# Patient Record
Sex: FEMALE | Race: WHITE | NOT HISPANIC OR LATINO | Employment: UNEMPLOYED | URBAN - METROPOLITAN AREA
[De-identification: names, ages, dates, MRNs, and addresses within clinical notes are randomized per-mention and may not be internally consistent; named-entity substitution may affect disease eponyms.]

---

## 2017-12-21 ENCOUNTER — TELEPHONE (OUTPATIENT)
Dept: OTOLARYNGOLOGY | Facility: CLINIC | Age: 22
End: 2017-12-21

## 2017-12-22 ENCOUNTER — HISTORICAL (OUTPATIENT)
Dept: ADMINISTRATIVE | Facility: HOSPITAL | Age: 22
End: 2017-12-22

## 2017-12-22 ENCOUNTER — HOSPITAL ENCOUNTER (OUTPATIENT)
Dept: INTERNAL MEDICINE | Facility: CLINIC | Age: 22
End: 2022-03-25
Attending: NURSE PRACTITIONER | Admitting: PHYSICIAN ASSISTANT

## 2017-12-22 LAB
ABS NEUT (OLG): 3.68 X10(3)/MCL (ref 2.1–9.2)
APPEARANCE, UA: CLEAR
BACTERIA #/AREA URNS AUTO: ABNORMAL /[HPF]
BASOPHILS # BLD AUTO: 0.02 X10(3)/MCL
BASOPHILS NFR BLD AUTO: 0 % (ref 0–1)
BILIRUB UR QL STRIP: NEGATIVE
COLOR UR: YELLOW
CRP SERPL-MCNC: <0.3 MG/DL
EOSINOPHIL # BLD AUTO: 0.04 X10(3)/MCL
EOSINOPHIL NFR BLD AUTO: 1 % (ref 0–5)
ERYTHROCYTE [DISTWIDTH] IN BLOOD BY AUTOMATED COUNT: 13.2 % (ref 11.5–14.5)
ERYTHROCYTE [SEDIMENTATION RATE] IN BLOOD: 7 MM/HR (ref 0–20)
GLUCOSE (UA): NORMAL
HAV IGM SERPL QL IA: NONREACTIVE
HBV CORE IGM SERPL QL IA: NONREACTIVE
HBV SURFACE AG SERPL QL IA: NEGATIVE
HCT VFR BLD AUTO: 38.6 % (ref 35–46)
HCV AB SERPL QL IA: NONREACTIVE
HGB BLD-MCNC: 12.9 GM/DL (ref 12–16)
HGB UR QL STRIP: NEGATIVE
HIV 1+2 AB+HIV1 P24 AG SERPL QL IA: NONREACTIVE
HYALINE CASTS #/AREA URNS LPF: ABNORMAL /[LPF]
IMM GRANULOCYTES # BLD AUTO: 0.01 10*3/UL
IMM GRANULOCYTES NFR BLD AUTO: 0 %
KETONES UR QL STRIP: 10 MG/DL
LEUKOCYTE ESTERASE UR QL STRIP: NEGATIVE
LYMPHOCYTES # BLD AUTO: 1.84 X10(3)/MCL
LYMPHOCYTES NFR BLD AUTO: 31 % (ref 15–40)
MCH RBC QN AUTO: 30.4 PG (ref 26–34)
MCHC RBC AUTO-ENTMCNC: 33.4 GM/DL (ref 31–37)
MCV RBC AUTO: 90.8 FL (ref 80–100)
MONOCYTES # BLD AUTO: 0.42 X10(3)/MCL
MONOCYTES NFR BLD AUTO: 7 % (ref 4–12)
NEUTROPHILS # BLD AUTO: 3.68 X10(3)/MCL
NEUTROPHILS NFR BLD AUTO: 61 X10(3)/MCL
NITRITE UR QL STRIP: NEGATIVE
PH UR STRIP: 6.5 [PH] (ref 4.5–8)
PLATELET # BLD AUTO: 209 X10(3)/MCL (ref 130–400)
PMV BLD AUTO: 12.2 FL (ref 7.4–10.4)
PROT UR QL STRIP: 50 MG/DL
RBC # BLD AUTO: 4.25 X10(6)/MCL (ref 4–5.2)
RBC #/AREA URNS AUTO: ABNORMAL /[HPF]
SP GR UR STRIP: >1.05 (ref 1–1.03)
SQUAMOUS #/AREA URNS LPF: >100 /[LPF]
T PALLIDUM AB SER QL: NONREACTIVE
T3RU NFR SERPL: 31 % (ref 31–39)
T4 FREE SERPL-MCNC: 0.58 NG/DL (ref 0.76–1.46)
T4 SERPL-MCNC: 4.6 MCG/DL (ref 4.8–13.9)
TSH SERPL-ACNC: 7.18 MIU/L (ref 0.36–3.74)
UROBILINOGEN UR STRIP-ACNC: NORMAL
WBC # SPEC AUTO: 6 X10(3)/MCL (ref 4.5–11)
WBC #/AREA URNS AUTO: ABNORMAL /HPF

## 2017-12-24 LAB — FINAL CULTURE: NO GROWTH

## 2017-12-27 ENCOUNTER — TELEPHONE (OUTPATIENT)
Dept: OTOLARYNGOLOGY | Facility: CLINIC | Age: 22
End: 2017-12-27

## 2017-12-27 LAB — FINAL CULTURE: NORMAL

## 2017-12-27 NOTE — TELEPHONE ENCOUNTER
Attempted to reach this patient to cancel the appointment she has scheduled with Dr. Kumar on March 14th due to patient not having a referral in chart but fast busy signal.

## 2018-01-04 ENCOUNTER — HISTORICAL (OUTPATIENT)
Dept: RADIOLOGY | Facility: HOSPITAL | Age: 23
End: 2018-01-04

## 2018-01-08 ENCOUNTER — HISTORICAL (OUTPATIENT)
Dept: INTERNAL MEDICINE | Facility: CLINIC | Age: 23
End: 2018-01-08

## 2018-01-08 LAB
ABS NEUT (OLG): 6.24 X10(3)/MCL (ref 2.1–9.2)
BASOPHILS # BLD AUTO: 0.04 X10(3)/MCL
BASOPHILS NFR BLD AUTO: 0 % (ref 0–1)
CRP SERPL-MCNC: <0.3 MG/DL
EOSINOPHIL # BLD AUTO: 0.08 X10(3)/MCL
EOSINOPHIL NFR BLD AUTO: 1 % (ref 0–5)
ERYTHROCYTE [DISTWIDTH] IN BLOOD BY AUTOMATED COUNT: 13.2 % (ref 11.5–14.5)
ERYTHROCYTE [SEDIMENTATION RATE] IN BLOOD: 2 MM/HR (ref 0–20)
HCT VFR BLD AUTO: 40.7 % (ref 35–46)
HGB BLD-MCNC: 13.5 GM/DL (ref 12–16)
IMM GRANULOCYTES # BLD AUTO: 0.03 10*3/UL
IMM GRANULOCYTES NFR BLD AUTO: 0 %
LYMPHOCYTES # BLD AUTO: 2.13 X10(3)/MCL
LYMPHOCYTES NFR BLD AUTO: 23 % (ref 15–40)
MCH RBC QN AUTO: 30.8 PG (ref 26–34)
MCHC RBC AUTO-ENTMCNC: 33.2 GM/DL (ref 31–37)
MCV RBC AUTO: 92.9 FL (ref 80–100)
MONOCYTES # BLD AUTO: 0.59 X10(3)/MCL
MONOCYTES NFR BLD AUTO: 6 % (ref 4–12)
NEUTROPHILS # BLD AUTO: 6.24 X10(3)/MCL
NEUTROPHILS NFR BLD AUTO: 68 X10(3)/MCL
PHOSPHOLIPID AB COM-LC: NORMAL
PHOSPHOLIPID AB INT-LC: NORMAL
PLATELET # BLD AUTO: 205 X10(3)/MCL (ref 130–400)
PMV BLD AUTO: 12.8 FL (ref 7.4–10.4)
RBC # BLD AUTO: 4.38 X10(6)/MCL (ref 4–5.2)
WBC # SPEC AUTO: 9.1 X10(3)/MCL (ref 4.5–11)

## 2018-01-12 ENCOUNTER — HISTORICAL (OUTPATIENT)
Dept: INTERNAL MEDICINE | Facility: CLINIC | Age: 23
End: 2018-01-12

## 2018-01-13 LAB
FINAL CULTURE: NORMAL
FINAL CULTURE: NORMAL

## 2018-02-01 ENCOUNTER — HISTORICAL (OUTPATIENT)
Dept: INTERNAL MEDICINE | Facility: CLINIC | Age: 23
End: 2018-02-01

## 2018-02-01 LAB
T4 FREE SERPL-MCNC: 0.87 NG/DL (ref 0.76–1.46)
TSH SERPL-ACNC: 0.33 MIU/L (ref 0.36–3.74)

## 2018-02-04 LAB — FINAL CULTURE: NORMAL

## 2018-02-05 ENCOUNTER — HISTORICAL (OUTPATIENT)
Dept: ADMINISTRATIVE | Facility: HOSPITAL | Age: 23
End: 2018-02-05

## 2018-02-05 LAB
BUN SERPL-MCNC: 9 MG/DL (ref 7–18)
CALCIUM SERPL-MCNC: 9 MG/DL (ref 8.5–10.1)
CHLORIDE SERPL-SCNC: 103 MMOL/L (ref 98–107)
CO2 SERPL-SCNC: 28 MMOL/L (ref 21–32)
CREAT SERPL-MCNC: 0.6 MG/DL (ref 0.6–1.3)
FOLATE SERPL-MCNC: 10.6 NG/ML (ref 3.1–17.5)
GLUCOSE SERPL-MCNC: 91 MG/DL (ref 74–106)
POTASSIUM SERPL-SCNC: 3.9 MMOL/L (ref 3.5–5.1)
SODIUM SERPL-SCNC: 138 MMOL/L (ref 136–145)
VIT B12 SERPL-MCNC: 529 PG/ML (ref 193–986)

## 2018-03-05 ENCOUNTER — HISTORICAL (OUTPATIENT)
Dept: INTERNAL MEDICINE | Facility: CLINIC | Age: 23
End: 2018-03-05

## 2018-03-05 LAB
BUN SERPL-MCNC: 10 MG/DL (ref 7–18)
CALCIUM SERPL-MCNC: 8.7 MG/DL (ref 8.5–10.1)
CHLORIDE SERPL-SCNC: 105 MMOL/L (ref 98–107)
CO2 SERPL-SCNC: 27 MMOL/L (ref 21–32)
CREAT SERPL-MCNC: 0.6 MG/DL (ref 0.6–1.3)
CREAT/UREA NIT SERPL: 17
GLUCOSE SERPL-MCNC: 89 MG/DL (ref 74–106)
MAGNESIUM SERPL-MCNC: 2.3 MG/DL (ref 1.8–2.4)
POTASSIUM SERPL-SCNC: 4.1 MMOL/L (ref 3.5–5.1)
SODIUM SERPL-SCNC: 138 MMOL/L (ref 136–145)
T4 FREE SERPL-MCNC: 0.64 NG/DL (ref 0.76–1.46)
T4 SERPL-MCNC: 7.2 MCG/DL (ref 4.8–13.9)
TSH SERPL-ACNC: 7.21 MIU/L (ref 0.36–3.74)

## 2018-04-04 ENCOUNTER — HISTORICAL (OUTPATIENT)
Dept: ADMINISTRATIVE | Facility: HOSPITAL | Age: 23
End: 2018-04-04

## 2018-04-04 LAB
ABS NEUT (OLG): 5.66 X10(3)/MCL (ref 2.1–9.2)
BASOPHILS # BLD AUTO: 0.05 X10(3)/MCL
BASOPHILS NFR BLD AUTO: 1 %
EOSINOPHIL # BLD AUTO: 0.22 X10(3)/MCL
EOSINOPHIL NFR BLD AUTO: 2 %
ERYTHROCYTE [DISTWIDTH] IN BLOOD BY AUTOMATED COUNT: 13.5 % (ref 11.5–14.5)
HCT VFR BLD AUTO: 35.9 % (ref 35–46)
HGB BLD-MCNC: 11.3 GM/DL (ref 12–16)
IMM GRANULOCYTES # BLD AUTO: 0.03 10*3/UL
IMM GRANULOCYTES NFR BLD AUTO: 0 %
INR PPP: 0.96 (ref 0.9–1.2)
LYMPHOCYTES # BLD AUTO: 2.67 X10(3)/MCL
LYMPHOCYTES NFR BLD AUTO: 30 % (ref 13–40)
MCH RBC QN AUTO: 28.6 PG (ref 26–34)
MCHC RBC AUTO-ENTMCNC: 31.5 GM/DL (ref 31–37)
MCV RBC AUTO: 90.9 FL (ref 80–100)
MONOCYTES # BLD AUTO: 0.43 X10(3)/MCL
MONOCYTES NFR BLD AUTO: 5 % (ref 4–12)
NEUTROPHILS # BLD AUTO: 5.66 X10(3)/MCL
NEUTROPHILS NFR BLD AUTO: 62 X10(3)/MCL
PLATELET # BLD AUTO: 258 X10(3)/MCL (ref 130–400)
PMV BLD AUTO: 11.4 FL (ref 7.4–10.4)
PROTHROMBIN TIME: 12.1 SECOND(S) (ref 11.9–14.4)
RBC # BLD AUTO: 3.95 X10(6)/MCL (ref 4–5.2)
T4 FREE SERPL-MCNC: 0.58 NG/DL (ref 0.76–1.46)
T4 SERPL-MCNC: 6.3 MCG/DL (ref 4.8–13.9)
TSH SERPL-ACNC: 3.84 MIU/L (ref 0.36–3.74)
WBC # SPEC AUTO: 9.1 X10(3)/MCL (ref 4.5–11)

## 2018-05-08 ENCOUNTER — HISTORICAL (OUTPATIENT)
Dept: RADIOLOGY | Facility: HOSPITAL | Age: 23
End: 2018-05-08

## 2018-07-05 ENCOUNTER — HISTORICAL (OUTPATIENT)
Dept: INTERNAL MEDICINE | Facility: CLINIC | Age: 23
End: 2018-07-05

## 2018-07-05 LAB
T4 FREE SERPL-MCNC: 1.02 NG/DL (ref 0.76–1.46)
TSH SERPL-ACNC: 0.63 MIU/L (ref 0.36–3.74)

## 2018-07-20 ENCOUNTER — HISTORICAL (OUTPATIENT)
Dept: ADMINISTRATIVE | Facility: HOSPITAL | Age: 23
End: 2018-07-20

## 2018-07-20 LAB
APPEARANCE, UA: ABNORMAL
B-HCG SERPL QL: NEGATIVE
BACTERIA #/AREA URNS AUTO: ABNORMAL /[HPF]
BILIRUB UR QL STRIP: NEGATIVE
COLOR UR: YELLOW
GLUCOSE (UA): NORMAL
HGB UR QL STRIP: NEGATIVE
HYALINE CASTS #/AREA URNS LPF: ABNORMAL /[LPF]
KETONES UR QL STRIP: NEGATIVE
LEUKOCYTE ESTERASE UR QL STRIP: 250 LEU/UL
NITRITE UR QL STRIP: NEGATIVE
PH UR STRIP: 6.5 [PH] (ref 4.5–8)
PROT UR QL STRIP: 20 MG/DL
RBC #/AREA URNS AUTO: ABNORMAL /[HPF]
SP GR UR STRIP: 1.03 (ref 1–1.03)
SQUAMOUS #/AREA URNS LPF: >100 /[LPF]
UROBILINOGEN UR STRIP-ACNC: NORMAL
WBC #/AREA URNS AUTO: ABNORMAL /HPF

## 2018-07-22 LAB — FINAL CULTURE: NO GROWTH

## 2018-08-02 ENCOUNTER — HISTORICAL (OUTPATIENT)
Dept: ADMINISTRATIVE | Facility: HOSPITAL | Age: 23
End: 2018-08-02

## 2018-08-02 LAB
T3FREE SERPL-MCNC: 3.55 PG/ML (ref 2.18–3.98)
T4 FREE SERPL-MCNC: 1.14 NG/DL (ref 0.76–1.46)
TSH SERPL-ACNC: 0.02 MIU/L (ref 0.36–3.74)

## 2018-10-19 ENCOUNTER — HISTORICAL (OUTPATIENT)
Dept: ADMINISTRATIVE | Facility: HOSPITAL | Age: 23
End: 2018-10-19

## 2018-10-19 LAB — TSH SERPL-ACNC: 0.37 MIU/L (ref 0.36–3.74)

## 2019-05-08 ENCOUNTER — HISTORICAL (OUTPATIENT)
Dept: ADMINISTRATIVE | Facility: HOSPITAL | Age: 24
End: 2019-05-08

## 2019-05-08 LAB
T3FREE SERPL-MCNC: 3.68 PG/ML (ref 2.18–3.98)
T4 FREE SERPL-MCNC: 1 NG/DL (ref 0.76–1.46)
TSH SERPL-ACNC: 0.33 MIU/L (ref 0.36–3.74)

## 2019-06-28 ENCOUNTER — HISTORICAL (OUTPATIENT)
Dept: RADIOLOGY | Facility: HOSPITAL | Age: 24
End: 2019-06-28

## 2019-07-03 ENCOUNTER — TELEPHONE (OUTPATIENT)
Dept: OTOLARYNGOLOGY | Facility: CLINIC | Age: 24
End: 2019-07-03

## 2019-07-03 NOTE — TELEPHONE ENCOUNTER
Informed that pt needs to see her PCP and get a referral and possibly a suggestion from them in order to be seen.        ----- Message from Santa Silver sent at 7/3/2019  4:05 PM CDT -----  Contact: pt   Pt is requesting an appt due to neck mass and thyroid    Pt can be reached at [phone]

## 2019-07-15 ENCOUNTER — TELEPHONE (OUTPATIENT)
Dept: OTOLARYNGOLOGY | Facility: CLINIC | Age: 24
End: 2019-07-15

## 2019-08-29 ENCOUNTER — LAB VISIT (OUTPATIENT)
Dept: LAB | Facility: HOSPITAL | Age: 24
End: 2019-08-29
Attending: OTOLARYNGOLOGY
Payer: MEDICAID

## 2019-08-29 ENCOUNTER — OFFICE VISIT (OUTPATIENT)
Dept: OTOLARYNGOLOGY | Facility: CLINIC | Age: 24
End: 2019-08-29
Payer: MEDICAID

## 2019-08-29 VITALS
WEIGHT: 123.44 LBS | TEMPERATURE: 99 F | DIASTOLIC BLOOD PRESSURE: 72 MMHG | HEART RATE: 103 BPM | SYSTOLIC BLOOD PRESSURE: 108 MMHG

## 2019-08-29 DIAGNOSIS — E05.00 GRAVES DISEASE: ICD-10-CM

## 2019-08-29 DIAGNOSIS — E05.00 GRAVES DISEASE: Primary | ICD-10-CM

## 2019-08-29 LAB
T4 SERPL-MCNC: 7.5 UG/DL (ref 4.5–11.5)
TSH SERPL DL<=0.005 MIU/L-ACNC: 3.21 UIU/ML (ref 0.4–4)

## 2019-08-29 PROCEDURE — 99999 PR PBB SHADOW E&M-EST. PATIENT-LVL III: CPT | Mod: PBBFAC,,, | Performed by: OTOLARYNGOLOGY

## 2019-08-29 PROCEDURE — 99999 PR PBB SHADOW E&M-EST. PATIENT-LVL III: ICD-10-PCS | Mod: PBBFAC,,, | Performed by: OTOLARYNGOLOGY

## 2019-08-29 PROCEDURE — 99203 OFFICE O/P NEW LOW 30 MIN: CPT | Mod: S$PBB,,, | Performed by: OTOLARYNGOLOGY

## 2019-08-29 PROCEDURE — 84443 ASSAY THYROID STIM HORMONE: CPT

## 2019-08-29 PROCEDURE — 84436 ASSAY OF TOTAL THYROXINE: CPT

## 2019-08-29 PROCEDURE — 99203 PR OFFICE/OUTPT VISIT, NEW, LEVL III, 30-44 MIN: ICD-10-PCS | Mod: S$PBB,,, | Performed by: OTOLARYNGOLOGY

## 2019-08-29 PROCEDURE — 36415 COLL VENOUS BLD VENIPUNCTURE: CPT

## 2019-08-29 PROCEDURE — 99213 OFFICE O/P EST LOW 20 MIN: CPT | Mod: PBBFAC | Performed by: OTOLARYNGOLOGY

## 2019-08-29 RX ORDER — GABAPENTIN 300 MG/1
300 CAPSULE ORAL NIGHTLY
Refills: 5 | COMMUNITY
Start: 2019-08-05 | End: 2022-07-19

## 2019-08-29 RX ORDER — NITROFURANTOIN 25; 75 MG/1; MG/1
CAPSULE ORAL
Refills: 0 | COMMUNITY
Start: 2019-08-13 | End: 2022-07-19

## 2019-08-29 RX ORDER — FLUTICASONE PROPIONATE 50 MCG
SPRAY, SUSPENSION (ML) NASAL
Refills: 0 | COMMUNITY
Start: 2019-06-12 | End: 2022-07-19

## 2019-08-29 RX ORDER — RIZATRIPTAN BENZOATE 10 MG/1
TABLET ORAL
Refills: 1 | COMMUNITY
Start: 2019-08-19 | End: 2022-07-19

## 2019-08-29 RX ORDER — BUSPIRONE HYDROCHLORIDE 10 MG/1
TABLET ORAL
Refills: 2 | COMMUNITY
Start: 2019-08-19 | End: 2022-05-12

## 2019-08-29 RX ORDER — DULOXETIN HYDROCHLORIDE 60 MG/1
60 CAPSULE, DELAYED RELEASE ORAL DAILY
Refills: 5 | COMMUNITY
Start: 2019-08-05

## 2019-08-29 RX ORDER — METHIMAZOLE 5 MG/1
10 TABLET ORAL
COMMUNITY
End: 2022-07-19

## 2019-08-29 RX ORDER — PROPRANOLOL HYDROCHLORIDE 10 MG/1
10 TABLET ORAL 2 TIMES DAILY
Refills: 5 | COMMUNITY
Start: 2019-08-05 | End: 2023-11-28

## 2019-08-29 RX ORDER — BACLOFEN 20 MG/1
TABLET ORAL
Refills: 0 | COMMUNITY
Start: 2019-07-10 | End: 2022-10-06 | Stop reason: CLARIF

## 2019-08-29 RX ORDER — AMOXICILLIN 875 MG/1
TABLET, FILM COATED ORAL
Refills: 0 | COMMUNITY
Start: 2019-08-19 | End: 2022-10-06 | Stop reason: CLARIF

## 2019-08-29 RX ORDER — ALPRAZOLAM 0.25 MG/1
TABLET ORAL
Refills: 0 | COMMUNITY
Start: 2019-07-24 | End: 2022-07-19

## 2019-08-29 NOTE — LETTER
August 30, 2019      Claude H. Meeks, MD  [address] [place] [address] Dr Doron QUIROZ [address]           [facility]  [address]  [facility] [address]  Phone: [phone]  Fax: [phone]          Patient: Royce Quigley   MR Number: [id number]   YOB: 1995   Date of Visit: 8/29/2019       Dear Dr. Claude H. Meeks:    Thank you for referring Royce Quigley to me for evaluation. Attached you will find relevant portions of my assessment and plan of care.    If you have questions, please do not hesitate to call me. I look forward to following Royce Quigley along with you.    Sincerely,    Darshan Arias MD    Enclosure  CC:  No Recipients    If you would like to receive this communication electronically, please contact [email or web address] or [phone] to request more information on [facility] access.    For providers and/or their staff who would like to refer a patient to [facility], please contact us through our one-stop-shop provider referral line, [facility], at [phone].    If you feel you have received this communication in error or would no longer like to receive these types of communications, please e-mail [email or web address]

## 2019-08-30 PROBLEM — E05.00 GRAVES DISEASE: Status: ACTIVE | Noted: 2019-08-30

## 2019-08-30 NOTE — PROGRESS NOTES
Chief Complaint   Patient presents with    Thyroid Problem       HPI   24 y.o. female presents for evaluation of her surgical options for hyperthyroidism.  She has been followed by ENT and endocrinology near her home in [place], [place].  Both surgery and radioactive iodine have been recommended to her.  She presents today for a 2nd opinion.  She is currently on methimazole and is tolerating it reasonably well. She has not had recent thyroid functions.  She reports that she feels her eyes are somewhat prominent but she denies danisha eye symptoms.  She also denies danisha compressive symptoms such as dysphagia or shortness of breath.  She underwent recent thyroid ultrasound which revealed a normal size thyroid with several nodules.  None of these nodules met criteria for fine-needle aspiration.    Review of Systems   Constitutional: Negative for fatigue and unexpected weight change.   HENT: Per HPI.  Eyes: Negative for visual disturbance.   Respiratory: Negative for shortness of breath, hemoptysis   Cardiovascular: Negative for chest pain and palpitations.   Musculoskeletal: Negative for decreased ROM, back pain.   Skin: Negative for rash, sunburn, itching.   Neurological: Negative for dizziness and seizures.   Hematological: Negative for adenopathy. Does not bruise/bleed easily.   Endocrine: Negative for rapid weight loss/weight gain, heat/cold intolerance.     Past Medical History   There is no problem list on file for this patient.          Past Surgical History   History reviewed. No pertinent surgical history.      Family History   History reviewed. No pertinent family history.        Social History   .  Social History     Socioeconomic History    Marital status: Single     Spouse name: Not on file    Number of children: Not on file    Years of education: Not on file    Highest education level: Not on file   Occupational History    Not on file   Social Needs    Financial resource strain: Not on file    Food  insecurity:     Worry: Not on file     Inability: Not on file    Transportation needs:     Medical: Not on file     Non-medical: Not on file   Tobacco Use    Smoking status: Current Every Day Smoker   Substance and Sexual Activity    Alcohol use: Not on file    Drug use: Not on file    Sexual activity: Not on file   Lifestyle    Physical activity:     Days per week: Not on file     Minutes per session: Not on file    Stress: Not on file   Relationships    Social connections:     Talks on phone: Not on file     Gets together: Not on file     Attends Protestant service: Not on file     Active member of club or organization: Not on file     Attends meetings of clubs or organizations: Not on file     Relationship status: Not on file   Other Topics Concern    Not on file   Social History Narrative    Not on file         Allergies   Review of patient's allergies indicates:  No Known Allergies        Physical Exam     Vitals:    08/29/19 1435   BP: 108/72   Pulse: 103   Temp: 98.9 °F (37.2 °C)         There is no height or weight on file to calculate BMI.      General: AOx3, NAD   Respiratory:  Symmetric chest rise, normal effort  Right Ear: External Auditory Canal WNL,TM w/o masses/lesions/perforations.  Middle ear without evidence of effusion.   Left Ear: External Auditory Canal WNL,TM w/o masses/lesions/perforations.  Middle ear without evidence of effusion.   Nose: No gross nasal septal deviation. Inferior Turbinates WNL bilaterally. No septal perforation. No masses/lesions.   Oral Cavity:  Oral Tongue mobile, no lesions noted. Hard Palate WNL. No buccal or FOM lesions.  Oropharynx:  No masses/lesions of the posterior pharyngeal wall. Tonsillar fossa without lesions. Soft palate without masses. Midline uvula.   Neck: No scars.  No cervical lymphadenopathy, thyromegaly or thyroid nodules.  Normal range of motion.    Face: House Brackmann I bilaterally.     Outside records reviewed.    Assessment/Plan  Problem  List Items Addressed This Visit        Endocrine    Graves disease - Primary     Hyperthyroidism presumably due to Graves disease. I explained to her that both radioactive iodine and surgery are reasonable options.  I reviewed the risks, benefits and alternatives to surgery.    I explained the risks of thyroidectomy include, but are not limited to, infection, bleeding, scarring, failure to achieve the diagnosis, no evidence of cancer, recurrence, collection of blood or tissue fluid requiring drainage, injury to the recurrent laryngeal nerve with resultant temporary or permanent hoarseness (1% permanent risk with up to 10% temporary risk, greater in revision operations), injury to the superior laryngeal nerve with resultant loss of the upper register for singing or challenges with yelling, temporary or permanent hypocalcemia related to injury or devascularization of the parathyroid glands (less than 5% permanent, up to 30-60% when paratracheal dissection is accomplished, again greater in revision operations), and the need for additional procedures or therapies.  Time was allowed for questions, and all questions were answered to the patient's apparent satisfaction.     I urged her to seek the  of endocrinology regarding radioactive iodine.  She will contact me with additional questions or if she wishes to schedule surgery after seeing our endocrinologists.             Relevant Orders    TSH (Completed)    T4 (Completed)

## 2019-08-30 NOTE — ASSESSMENT & PLAN NOTE
Hyperthyroidism presumably due to Graves disease. I explained to her that both radioactive iodine and surgery are reasonable options.  I reviewed the risks, benefits and alternatives to surgery.    I explained the risks of thyroidectomy include, but are not limited to, infection, bleeding, scarring, failure to achieve the diagnosis, no evidence of cancer, recurrence, collection of blood or tissue fluid requiring drainage, injury to the recurrent laryngeal nerve with resultant temporary or permanent hoarseness (1% permanent risk with up to 10% temporary risk, greater in revision operations), injury to the superior laryngeal nerve with resultant loss of the upper register for singing or challenges with yelling, temporary or permanent hypocalcemia related to injury or devascularization of the parathyroid glands (less than 5% permanent, up to 30-60% when paratracheal dissection is accomplished, again greater in revision operations), and the need for additional procedures or therapies.  Time was allowed for questions, and all questions were answered to the patient's apparent satisfaction.     I urged her to seek the  of endocrinology regarding radioactive iodine.  She will contact me with additional questions or if she wishes to schedule surgery after seeing our endocrinologists.

## 2019-09-09 ENCOUNTER — TELEPHONE (OUTPATIENT)
Dept: ENDOCRINOLOGY | Facility: CLINIC | Age: 24
End: 2019-09-09

## 2019-09-09 NOTE — TELEPHONE ENCOUNTER
----- Message from She Whitaker sent at 9/9/2019 11:02 AM CDT -----  Contact: pt   Patient Requesting Sooner Appointment.     Reason for sooner appt.: Pt wants to reschedule her appt on 9/10 but the next appt coming up on my end  is in 01/2020.    When is the first available appointment?01/30/2020    Communication Preference:Please give pt a call back at [phone] for rescheduling.    Additional Information:n/a

## 2019-12-24 ENCOUNTER — HISTORICAL (OUTPATIENT)
Dept: ADMINISTRATIVE | Facility: HOSPITAL | Age: 24
End: 2019-12-24

## 2019-12-24 LAB
ABS NEUT (OLG): 4.12 X10(3)/MCL (ref 2.1–9.2)
ALBUMIN SERPL-MCNC: 3.8 GM/DL (ref 3.4–5)
ALBUMIN/GLOB SERPL: 1.1 RATIO (ref 1.1–2)
ALP SERPL-CCNC: 54 UNIT/L (ref 45–117)
ALT SERPL-CCNC: 14 UNIT/L (ref 12–78)
AST SERPL-CCNC: 7 UNIT/L (ref 15–37)
B-HCG FREE SERPL-ACNC: NORMAL MIU/ML
BASOPHILS # BLD AUTO: 0 X10(3)/MCL (ref 0–0.2)
BASOPHILS NFR BLD AUTO: 1 %
BILIRUB SERPL-MCNC: 0.2 MG/DL (ref 0.2–1)
BILIRUBIN DIRECT+TOT PNL SERPL-MCNC: <0.1 MG/DL (ref 0–0.2)
BILIRUBIN DIRECT+TOT PNL SERPL-MCNC: ABNORMAL MG/DL
BUN SERPL-MCNC: 10 MG/DL (ref 7–18)
CALCIUM SERPL-MCNC: 8.7 MG/DL (ref 8.5–10.1)
CHLORIDE SERPL-SCNC: 105 MMOL/L (ref 98–107)
CHOLEST SERPL-MCNC: 176 MG/DL
CHOLEST/HDLC SERPL: 2.6 {RATIO} (ref 0–4.4)
CO2 SERPL-SCNC: 28 MMOL/L (ref 21–32)
CREAT SERPL-MCNC: 0.6 MG/DL (ref 0.6–1.3)
EOSINOPHIL # BLD AUTO: 0.3 X10(3)/MCL (ref 0–0.9)
EOSINOPHIL NFR BLD AUTO: 3 %
ERYTHROCYTE [DISTWIDTH] IN BLOOD BY AUTOMATED COUNT: 13.7 % (ref 11.5–14.5)
GLOBULIN SER-MCNC: 3.6 GM/ML (ref 2.3–3.5)
GLUCOSE SERPL-MCNC: 78 MG/DL (ref 74–106)
HCT VFR BLD AUTO: 36.1 % (ref 35–46)
HDLC SERPL-MCNC: 68 MG/DL (ref 40–59)
HGB BLD-MCNC: 11.3 GM/DL (ref 12–16)
IMM GRANULOCYTES # BLD AUTO: 0.01 10*3/UL
IMM GRANULOCYTES NFR BLD AUTO: 0 %
LDLC SERPL CALC-MCNC: 87 MG/DL
LYMPHOCYTES # BLD AUTO: 3.3 X10(3)/MCL (ref 0.6–4.6)
LYMPHOCYTES NFR BLD AUTO: 39 %
MCH RBC QN AUTO: 28.4 PG (ref 26–34)
MCHC RBC AUTO-ENTMCNC: 31.3 GM/DL (ref 31–37)
MCV RBC AUTO: 90.7 FL (ref 80–100)
MONOCYTES # BLD AUTO: 0.7 X10(3)/MCL (ref 0.1–1.3)
MONOCYTES NFR BLD AUTO: 8 %
NEUTROPHILS # BLD AUTO: 4.12 X10(3)/MCL (ref 2.1–9.2)
NEUTROPHILS NFR BLD AUTO: 48 %
PLATELET # BLD AUTO: 216 X10(3)/MCL (ref 130–400)
PMV BLD AUTO: 11.9 FL (ref 7.4–10.4)
POTASSIUM SERPL-SCNC: 3.5 MMOL/L (ref 3.5–5.1)
PROT SERPL-MCNC: 7.4 GM/DL (ref 6.4–8.2)
RBC # BLD AUTO: 3.98 X10(6)/MCL (ref 4–5.2)
SODIUM SERPL-SCNC: 136 MMOL/L (ref 136–145)
T3FREE SERPL-MCNC: 3.25 PG/ML (ref 2.18–3.98)
T4 FREE SERPL-MCNC: 0.84 NG/DL (ref 0.76–1.46)
T4 SERPL-MCNC: 9.6 MCG/DL (ref 4.8–13.9)
TRIGL SERPL-MCNC: 107 MG/DL
TSH SERPL-ACNC: 7.39 MIU/L (ref 0.36–3.74)
VLDLC SERPL CALC-MCNC: 21 MG/DL
WBC # SPEC AUTO: 8.5 X10(3)/MCL (ref 4.5–11)

## 2020-01-01 ENCOUNTER — HISTORICAL (OUTPATIENT)
Dept: LAB | Facility: HOSPITAL | Age: 25
End: 2020-01-01

## 2020-01-01 LAB
T3FREE SERPL-MCNC: 3.15 PG/ML (ref 2.18–3.98)
T4 FREE SERPL-MCNC: 0.86 NG/DL (ref 0.76–1.46)
T4 SERPL-MCNC: 9.7 MCG/DL (ref 4.8–13.9)
TSH SERPL-ACNC: 0.84 MIU/L (ref 0.36–3.74)

## 2020-01-07 LAB
HPV16+18+H RISK 12 DNA CVX-IMP: POSITIVE
PAP RECOMMENDATION EXT: ABNORMAL
PAP SMEAR: ABNORMAL

## 2020-01-14 ENCOUNTER — HISTORICAL (OUTPATIENT)
Dept: ADMINISTRATIVE | Facility: HOSPITAL | Age: 25
End: 2020-01-14

## 2020-01-14 LAB
B-HCG SERPL QL: POSITIVE
BILIRUB SERPL-MCNC: NEGATIVE MG/DL
BLOOD URINE, POC: NEGATIVE
CLARITY, POC UA: CLEAR
COLOR, POC UA: YELLOW
GLUCOSE UR QL STRIP: NEGATIVE
KETONES UR QL STRIP: NEGATIVE
LEUKOCYTE EST, POC UA: NEGATIVE
NITRITE, POC UA: NEGATIVE
PH, POC UA: 6
PROTEIN, POC: NEGATIVE
SPECIFIC GRAVITY, POC UA: 1.02
T3FREE SERPL-MCNC: 3.43 PG/ML (ref 2.18–3.98)
T4 FREE SERPL-MCNC: 1.12 NG/DL (ref 0.76–1.46)
T4 SERPL-MCNC: 14.5 MCG/DL (ref 4.8–13.9)
TSH SERPL-ACNC: 0.21 MIU/L (ref 0.36–3.74)
UROBILINOGEN, POC UA: NORMAL

## 2020-01-28 ENCOUNTER — HISTORICAL (OUTPATIENT)
Dept: ADMINISTRATIVE | Facility: HOSPITAL | Age: 25
End: 2020-01-28

## 2020-01-28 LAB
T3FREE SERPL-MCNC: 2.76 PG/ML (ref 2.18–3.98)
T4 FREE SERPL-MCNC: 1.01 NG/DL (ref 0.76–1.46)
T4 SERPL-MCNC: 12.2 MCG/DL (ref 4.8–13.9)
TSH SERPL-ACNC: 0.29 MIU/L (ref 0.36–3.74)

## 2020-02-04 ENCOUNTER — HISTORICAL (OUTPATIENT)
Dept: INTERNAL MEDICINE | Facility: CLINIC | Age: 25
End: 2020-02-04

## 2020-02-04 LAB
T4 FREE SERPL-MCNC: 0.99 NG/DL (ref 0.76–1.46)
T4 SERPL-MCNC: 14.2 MCG/DL (ref 4.8–13.9)
TSH SERPL-ACNC: 0.34 MIU/L (ref 0.36–3.74)

## 2020-02-20 ENCOUNTER — HISTORICAL (OUTPATIENT)
Dept: LAB | Facility: HOSPITAL | Age: 25
End: 2020-02-20

## 2020-02-20 LAB
T3FREE SERPL-MCNC: 2.9 PG/ML (ref 2.18–3.98)
T4 FREE SERPL-MCNC: 1 NG/DL (ref 0.76–1.46)
T4 SERPL-MCNC: 17.3 MCG/DL (ref 4.8–13.9)
TSH SERPL-ACNC: 0.61 MIU/L (ref 0.36–3.74)

## 2020-03-19 ENCOUNTER — HISTORICAL (OUTPATIENT)
Dept: LAB | Facility: HOSPITAL | Age: 25
End: 2020-03-19

## 2020-03-19 LAB — C DIFF INTERP: NEGATIVE

## 2020-03-23 ENCOUNTER — HISTORICAL (OUTPATIENT)
Dept: ADMINISTRATIVE | Facility: HOSPITAL | Age: 25
End: 2020-03-23

## 2020-03-23 LAB
T3FREE SERPL-MCNC: 2.61 PG/ML (ref 2.18–3.98)
T4 FREE SERPL-MCNC: 1.09 NG/DL (ref 0.76–1.46)
T4 SERPL-MCNC: 19.4 MCG/DL (ref 4.8–13.9)
TSH SERPL-ACNC: 0.44 MIU/L (ref 0.36–3.74)

## 2021-03-02 ENCOUNTER — HISTORICAL (OUTPATIENT)
Dept: ADMINISTRATIVE | Facility: HOSPITAL | Age: 26
End: 2021-03-02

## 2021-03-02 LAB
CK MB SERPL-MCNC: 0.3 NG/ML
TROPONIN I SERPL-MCNC: <0.01 NG/ML (ref 0–0.04)

## 2021-03-22 ENCOUNTER — HISTORICAL (OUTPATIENT)
Dept: ADMINISTRATIVE | Facility: HOSPITAL | Age: 26
End: 2021-03-22

## 2021-03-22 LAB
T3FREE SERPL-MCNC: 3.02 PG/ML (ref 1.71–3.71)
T4 FREE SERPL-MCNC: 0.72 NG/DL (ref 0.7–1.48)
TSH SERPL-ACNC: 7 UIU/ML (ref 0.35–4.94)

## 2021-05-21 ENCOUNTER — HISTORICAL (OUTPATIENT)
Dept: ADMINISTRATIVE | Facility: HOSPITAL | Age: 26
End: 2021-05-21

## 2021-05-21 LAB — TROPONIN I SERPL-MCNC: <0.01 NG/ML (ref 0–0.04)

## 2021-09-22 LAB
CK MB SERPL-MCNC: 0.3 NG/ML
TROPONIN I SERPL-MCNC: <0.01 NG/ML (ref 0–0.04)

## 2021-09-23 ENCOUNTER — HISTORICAL (OUTPATIENT)
Dept: RADIOLOGY | Facility: HOSPITAL | Age: 26
End: 2021-09-23

## 2021-09-30 ENCOUNTER — HISTORICAL (OUTPATIENT)
Dept: LAB | Facility: HOSPITAL | Age: 26
End: 2021-09-30

## 2021-09-30 LAB
ALBUMIN SERPL-MCNC: 4.4 GM/DL (ref 3.5–5)
ALBUMIN/GLOB SERPL: 1.3 RATIO (ref 1.1–2)
ALP SERPL-CCNC: 69 UNIT/L (ref 40–150)
ALT SERPL-CCNC: 5 UNIT/L (ref 0–55)
AMPHET UR QL SCN: NEGATIVE
AST SERPL-CCNC: 9 UNIT/L (ref 5–34)
B-HCG SERPL QL: NEGATIVE
BARBITURATE SCN PRESENT UR: NEGATIVE
BENZODIAZ UR QL SCN: NEGATIVE
BILIRUB SERPL-MCNC: 0.4 MG/DL
BILIRUBIN DIRECT+TOT PNL SERPL-MCNC: 0.2 MG/DL (ref 0–0.5)
BILIRUBIN DIRECT+TOT PNL SERPL-MCNC: 0.2 MG/DL (ref 0–0.8)
BUN SERPL-MCNC: 7.5 MG/DL (ref 7–18.7)
CALCIUM SERPL-MCNC: 9.5 MG/DL (ref 8.4–10.2)
CANNABINOIDS UR QL SCN: NEGATIVE
CHLORIDE SERPL-SCNC: 103 MMOL/L (ref 98–107)
CO2 SERPL-SCNC: 29 MMOL/L (ref 22–29)
COCAINE UR QL SCN: NEGATIVE
CREAT SERPL-MCNC: 0.65 MG/DL (ref 0.55–1.02)
DEPRECATED CALCIDIOL+CALCIFEROL SERPL-MC: 45 NG/ML (ref 30–80)
ERYTHROCYTE [DISTWIDTH] IN BLOOD BY AUTOMATED COUNT: 14.1 % (ref 11.5–14.5)
EST. AVERAGE GLUCOSE BLD GHB EST-MCNC: 96.8 MG/DL
GLOBULIN SER-MCNC: 3.5 GM/DL (ref 2.4–3.5)
GLUCOSE SERPL-MCNC: 86 MG/DL (ref 74–100)
HBA1C MFR BLD: 5 %
HCT VFR BLD AUTO: 41.3 % (ref 35–46)
HGB BLD-MCNC: 12.9 GM/DL (ref 12–16)
MCH RBC QN AUTO: 28.5 PG (ref 26–34)
MCHC RBC AUTO-ENTMCNC: 31.2 GM/DL (ref 31–37)
MCV RBC AUTO: 91.2 FL (ref 80–100)
NRBC BLD AUTO-RTO: 0 % (ref 0–0.2)
OPIATES UR QL SCN: NEGATIVE
PCP UR QL: NEGATIVE
PH UR STRIP.AUTO: 7.5 [PH] (ref 3–11)
PLATELET # BLD AUTO: 231 X10(3)/MCL (ref 130–400)
PMV BLD AUTO: 11.7 FL (ref 7.4–10.4)
POTASSIUM SERPL-SCNC: 3.8 MMOL/L (ref 3.5–5.1)
PROLACTIN LEVEL (OHS): 6.04 NG/ML (ref 5.18–26.53)
PROT SERPL-MCNC: 7.9 GM/DL (ref 6.4–8.3)
RBC # BLD AUTO: 4.53 X10(6)/MCL (ref 4–5.2)
SODIUM SERPL-SCNC: 138 MMOL/L (ref 136–145)
T3RU NFR SERPL: 31.68 % (ref 31–39)
T4 FREE SERPL-MCNC: 1 NG/DL (ref 0.7–1.48)
TSH SERPL-ACNC: 1.52 UIU/ML (ref 0.35–4.94)
WBC # SPEC AUTO: 8.1 X10(3)/MCL (ref 4.5–11)

## 2021-10-01 ENCOUNTER — HISTORICAL (OUTPATIENT)
Dept: LAB | Facility: HOSPITAL | Age: 26
End: 2021-10-01

## 2021-10-01 LAB
CHOLEST SERPL-MCNC: 180 MG/DL
CHOLEST/HDLC SERPL: 3 {RATIO} (ref 0–5)
HDLC SERPL-MCNC: 52 MG/DL (ref 35–60)
LDLC SERPL CALC-MCNC: 108 MG/DL (ref 50–140)
TRIGL SERPL-MCNC: 100 MG/DL (ref 37–140)
VLDLC SERPL CALC-MCNC: 20 MG/DL

## 2021-11-30 ENCOUNTER — HISTORICAL (OUTPATIENT)
Dept: SURGERY | Facility: HOSPITAL | Age: 26
End: 2021-11-30

## 2021-11-30 ENCOUNTER — HOSPITAL ENCOUNTER (OUTPATIENT)
Dept: OBSTETRICS AND GYNECOLOGY | Facility: HOSPITAL | Age: 26
End: 2021-12-03

## 2021-11-30 LAB
ABS NEUT (OLG): 11.49 X10(3)/MCL (ref 2.1–9.2)
ABS NEUT (OLG): 3.53 X10(3)/MCL (ref 2.1–9.2)
ABS NEUT (OLG): 6.53 X10(3)/MCL (ref 2.1–9.2)
ABS NEUT (OLG): 7.56 X10(3)/MCL (ref 2.1–9.2)
ALBUMIN SERPL-MCNC: 3.7 GM/DL (ref 3.5–5)
ALBUMIN SERPL-MCNC: 4 GM/DL (ref 3.5–5)
ALBUMIN/GLOB SERPL: 1.2 RATIO (ref 1.1–2)
ALBUMIN/GLOB SERPL: 1.5 RATIO (ref 1.1–2)
ALP SERPL-CCNC: 41 UNIT/L (ref 40–150)
ALP SERPL-CCNC: 52 UNIT/L (ref 40–150)
ALT SERPL-CCNC: 5 UNIT/L (ref 0–55)
ALT SERPL-CCNC: 8 UNIT/L (ref 0–55)
APPEARANCE, UA: CLEAR
AST SERPL-CCNC: 10 UNIT/L (ref 5–34)
AST SERPL-CCNC: 11 UNIT/L (ref 5–34)
B-HCG SERPL QL: NEGATIVE
BACTERIA #/AREA URNS AUTO: ABNORMAL /HPF
BASOPHILS # BLD AUTO: 0 X10(3)/MCL (ref 0–0.2)
BASOPHILS # BLD AUTO: 0.1 X10(3)/MCL (ref 0–0.2)
BASOPHILS NFR BLD AUTO: 0 %
BASOPHILS NFR BLD AUTO: 1 %
BILIRUB SERPL-MCNC: 0.3 MG/DL
BILIRUB SERPL-MCNC: 0.5 MG/DL
BILIRUB UR QL STRIP: NEGATIVE
BILIRUBIN DIRECT+TOT PNL SERPL-MCNC: 0.1 MG/DL (ref 0–0.5)
BILIRUBIN DIRECT+TOT PNL SERPL-MCNC: 0.2 MG/DL (ref 0–0.5)
BILIRUBIN DIRECT+TOT PNL SERPL-MCNC: 0.2 MG/DL (ref 0–0.8)
BILIRUBIN DIRECT+TOT PNL SERPL-MCNC: 0.3 MG/DL (ref 0–0.8)
BUN SERPL-MCNC: 7.8 MG/DL (ref 7–18.7)
BUN SERPL-MCNC: 8.6 MG/DL (ref 7–18.7)
CALCIUM SERPL-MCNC: 8.1 MG/DL (ref 8.7–10.5)
CALCIUM SERPL-MCNC: 9.2 MG/DL (ref 8.7–10.5)
CHLORIDE SERPL-SCNC: 104 MMOL/L (ref 98–107)
CHLORIDE SERPL-SCNC: 107 MMOL/L (ref 98–107)
CO2 SERPL-SCNC: 25 MMOL/L (ref 22–29)
CO2 SERPL-SCNC: 26 MMOL/L (ref 22–29)
COLOR UR: YELLOW
CREAT SERPL-MCNC: 0.58 MG/DL (ref 0.55–1.02)
CREAT SERPL-MCNC: 0.6 MG/DL (ref 0.55–1.02)
EOSINOPHIL # BLD AUTO: 0.1 X10(3)/MCL (ref 0–0.9)
EOSINOPHIL # BLD AUTO: 0.1 X10(3)/MCL (ref 0–0.9)
EOSINOPHIL # BLD AUTO: 0.4 X10(3)/MCL (ref 0–0.9)
EOSINOPHIL NFR BLD AUTO: 1 %
EOSINOPHIL NFR BLD AUTO: 1 %
EOSINOPHIL NFR BLD AUTO: 5 %
ERYTHROCYTE [DISTWIDTH] IN BLOOD BY AUTOMATED COUNT: 14.5 % (ref 11.5–14.5)
ERYTHROCYTE [DISTWIDTH] IN BLOOD BY AUTOMATED COUNT: 14.5 % (ref 11.5–14.5)
ERYTHROCYTE [DISTWIDTH] IN BLOOD BY AUTOMATED COUNT: 14.6 % (ref 11.5–14.5)
ERYTHROCYTE [DISTWIDTH] IN BLOOD BY AUTOMATED COUNT: 14.6 % (ref 11.5–14.5)
ERYTHROCYTE [DISTWIDTH] IN BLOOD BY AUTOMATED COUNT: 14.6 % (ref 11.5–17)
GLOBULIN SER-MCNC: 2.4 GM/DL (ref 2.4–3.5)
GLOBULIN SER-MCNC: 3.3 GM/DL (ref 2.4–3.5)
GLUCOSE (UA): NEGATIVE
GLUCOSE SERPL-MCNC: 103 MG/DL (ref 74–100)
GLUCOSE SERPL-MCNC: 132 MG/DL (ref 74–100)
HCT VFR BLD AUTO: 22.7 % (ref 37–47)
HCT VFR BLD AUTO: 28.3 % (ref 35–46)
HCT VFR BLD AUTO: 31.5 % (ref 35–46)
HCT VFR BLD AUTO: 33.6 % (ref 35–46)
HCT VFR BLD AUTO: 35.9 % (ref 35–46)
HGB BLD-MCNC: 10.2 GM/DL (ref 12–16)
HGB BLD-MCNC: 10.9 GM/DL (ref 12–16)
HGB BLD-MCNC: 11.6 GM/DL (ref 12–16)
HGB BLD-MCNC: 7.4 GM/DL (ref 12–16)
HGB BLD-MCNC: 9.3 GM/DL (ref 12–16)
HGB UR QL STRIP: NEGATIVE
HYALINE CASTS #/AREA URNS LPF: ABNORMAL /LPF
IMM GRANULOCYTES # BLD AUTO: 0.01 10*3/UL
IMM GRANULOCYTES # BLD AUTO: 0.03 10*3/UL
IMM GRANULOCYTES # BLD AUTO: 0.03 10*3/UL
IMM GRANULOCYTES NFR BLD AUTO: 0 %
KETONES UR QL STRIP: ABNORMAL
LEUKOCYTE ESTERASE UR QL STRIP: NEGATIVE
LIPASE SERPL-CCNC: 6 U/L
LYMPHOCYTES # BLD AUTO: 1.1 X10(3)/MCL (ref 0.6–4.6)
LYMPHOCYTES # BLD AUTO: 2.3 X10(3)/MCL (ref 0.6–4.6)
LYMPHOCYTES # BLD AUTO: 2.5 X10(3)/MCL (ref 0.6–4.6)
LYMPHOCYTES # BLD AUTO: 3.7 X10(3)/MCL (ref 0.6–4.6)
LYMPHOCYTES NFR BLD AUTO: 21 %
LYMPHOCYTES NFR BLD AUTO: 26 %
LYMPHOCYTES NFR BLD AUTO: 45 %
LYMPHOCYTES NFR BLD AUTO: 8 %
MCH RBC QN AUTO: 29.1 PG (ref 26–34)
MCH RBC QN AUTO: 29.1 PG (ref 26–34)
MCH RBC QN AUTO: 29.2 PG (ref 26–34)
MCH RBC QN AUTO: 29.2 PG (ref 27–31)
MCH RBC QN AUTO: 29.5 PG (ref 26–34)
MCHC RBC AUTO-ENTMCNC: 32.3 GM/DL (ref 31–37)
MCHC RBC AUTO-ENTMCNC: 32.4 GM/DL (ref 31–37)
MCHC RBC AUTO-ENTMCNC: 32.4 GM/DL (ref 31–37)
MCHC RBC AUTO-ENTMCNC: 32.6 GM/DL (ref 33–36)
MCHC RBC AUTO-ENTMCNC: 32.9 GM/DL (ref 31–37)
MCV RBC AUTO: 89.7 FL (ref 80–94)
MCV RBC AUTO: 89.8 FL (ref 80–100)
MCV RBC AUTO: 89.8 FL (ref 80–100)
MCV RBC AUTO: 90.2 FL (ref 80–100)
MCV RBC AUTO: 90.3 FL (ref 80–100)
MONOCYTES # BLD AUTO: 0.3 X10(3)/MCL (ref 0.1–1.3)
MONOCYTES # BLD AUTO: 0.6 X10(3)/MCL (ref 0.1–1.3)
MONOCYTES NFR BLD AUTO: 3 %
MONOCYTES NFR BLD AUTO: 6 %
MONOCYTES NFR BLD AUTO: 6 %
MONOCYTES NFR BLD AUTO: 7 %
NEUTROPHILS # BLD AUTO: 11.49 X10(3)/MCL (ref 2.1–9.2)
NEUTROPHILS # BLD AUTO: 3.53 X10(3)/MCL (ref 2.1–9.2)
NEUTROPHILS # BLD AUTO: 6.53 X10(3)/MCL (ref 2.1–9.2)
NEUTROPHILS # BLD AUTO: 7.56 X10(3)/MCL (ref 2.1–9.2)
NEUTROPHILS NFR BLD AUTO: 43 %
NEUTROPHILS NFR BLD AUTO: 66 %
NEUTROPHILS NFR BLD AUTO: 71 %
NEUTROPHILS NFR BLD AUTO: 88 %
NITRITE UR QL STRIP: NEGATIVE
NRBC BLD AUTO-RTO: 0 % (ref 0–0.2)
PH UR STRIP: 7 [PH] (ref 4.5–8)
PLATELET # BLD AUTO: 183 X10(3)/MCL (ref 130–400)
PLATELET # BLD AUTO: 217 X10(3)/MCL (ref 130–400)
PLATELET # BLD AUTO: 225 X10(3)/MCL (ref 130–400)
PLATELET # BLD AUTO: 226 X10(3)/MCL (ref 130–400)
PLATELET # BLD AUTO: 232 X10(3)/MCL (ref 130–400)
PMV BLD AUTO: 11.4 FL (ref 7.4–10.4)
PMV BLD AUTO: 11.5 FL (ref 7.4–10.4)
PMV BLD AUTO: 11.8 FL (ref 7.4–10.4)
PMV BLD AUTO: 11.9 FL (ref 7.4–10.4)
PMV BLD AUTO: 13 FL (ref 9.4–12.4)
POC BETA-HCG (QUAL): NEGATIVE
POTASSIUM SERPL-SCNC: 3.3 MMOL/L (ref 3.5–5.1)
POTASSIUM SERPL-SCNC: 3.5 MMOL/L (ref 3.5–5.1)
PROT SERPL-MCNC: 6.1 GM/DL (ref 6.4–8.3)
PROT SERPL-MCNC: 7.3 GM/DL (ref 6.4–8.3)
PROT UR QL STRIP: 30 MG/DL
RBC # BLD AUTO: 2.53 X10(6)/MCL (ref 4.2–5.4)
RBC # BLD AUTO: 3.15 X10(6)/MCL (ref 4–5.2)
RBC # BLD AUTO: 3.49 X10(6)/MCL (ref 4–5.2)
RBC # BLD AUTO: 3.74 X10(6)/MCL (ref 4–5.2)
RBC # BLD AUTO: 3.98 X10(6)/MCL (ref 4–5.2)
RBC #/AREA URNS AUTO: ABNORMAL /HPF
SARS-COV-2 AG RESP QL IA.RAPID: NEGATIVE
SODIUM SERPL-SCNC: 135 MMOL/L (ref 136–145)
SODIUM SERPL-SCNC: 139 MMOL/L (ref 136–145)
SP GR UR STRIP: 1.03 (ref 1–1.03)
SQUAMOUS #/AREA URNS LPF: ABNORMAL /LPF
UROBILINOGEN UR STRIP-ACNC: 2 MG/DL
WBC # SPEC AUTO: 10.6 X10(3)/MCL (ref 4.5–11)
WBC # SPEC AUTO: 13 X10(3)/MCL (ref 4.5–11.5)
WBC # SPEC AUTO: 13.7 X10(3)/MCL (ref 4.5–11)
WBC # SPEC AUTO: 8.3 X10(3)/MCL (ref 4.5–11)
WBC # SPEC AUTO: 9.8 X10(3)/MCL (ref 4.5–11)
WBC #/AREA URNS AUTO: ABNORMAL /HPF

## 2021-12-01 LAB
ABS NEUT (OLG): 4.22 X10(3)/MCL (ref 2.1–9.2)
APPEARANCE, UA: ABNORMAL
B-HCG SERPL QL: NEGATIVE
BACTERIA SPEC CULT: ABNORMAL /HPF
BASOPHILS # BLD AUTO: 0 X10(3)/MCL (ref 0–0.2)
BASOPHILS NFR BLD AUTO: 0 %
BILIRUB UR QL STRIP: NEGATIVE
COLOR UR: YELLOW
CROSSMATCH INTERPRETATION: NORMAL
EOSINOPHIL # BLD AUTO: 0.1 X10(3)/MCL (ref 0–0.9)
EOSINOPHIL NFR BLD AUTO: 1 %
ERYTHROCYTE [DISTWIDTH] IN BLOOD BY AUTOMATED COUNT: 14 % (ref 11.5–17)
GLUCOSE (UA): NEGATIVE
GROUP & RH: NORMAL
HCT VFR BLD AUTO: 28.2 % (ref 37–47)
HGB BLD-MCNC: 9.2 GM/DL (ref 12–16)
HGB UR QL STRIP: ABNORMAL
KETONES UR QL STRIP: ABNORMAL
LEUKOCYTE ESTERASE UR QL STRIP: NEGATIVE
LYMPHOCYTES # BLD AUTO: 3.4 X10(3)/MCL (ref 0.6–4.6)
LYMPHOCYTES NFR BLD AUTO: 41 %
MCH RBC QN AUTO: 29.1 PG (ref 27–31)
MCHC RBC AUTO-ENTMCNC: 32.6 GM/DL (ref 33–36)
MCV RBC AUTO: 89.2 FL (ref 80–94)
MONOCYTES # BLD AUTO: 0.5 X10(3)/MCL (ref 0.1–1.3)
MONOCYTES NFR BLD AUTO: 6 %
NEUTROPHILS # BLD AUTO: 4.22 X10(3)/MCL (ref 2.1–9.2)
NEUTROPHILS NFR BLD AUTO: 51 %
NITRITE UR QL STRIP: NEGATIVE
PH UR STRIP: 6 [PH] (ref 5–9)
PLATELET # BLD AUTO: 172 X10(3)/MCL (ref 130–400)
PMV BLD AUTO: 12.5 FL (ref 9.4–12.4)
PRODUCT READY: NORMAL
PROT UR QL STRIP: ABNORMAL
RBC # BLD AUTO: 3.16 X10(6)/MCL (ref 4.2–5.4)
RBC #/AREA URNS HPF: ABNORMAL /HPF
SP GR UR STRIP: 1.03 (ref 1–1.03)
SQUAMOUS EPITHELIAL, UA: 21 /HPF (ref 0–4)
TRANSFUSION ORDER: NORMAL
UROBILINOGEN UR STRIP-ACNC: 1
WBC # SPEC AUTO: 8.2 X10(3)/MCL (ref 4.5–11.5)
WBC #/AREA URNS HPF: 15 /HPF (ref 0–3)

## 2021-12-02 LAB
ABS NEUT (OLG): 3.78 X10(3)/MCL (ref 2.1–9.2)
BASOPHILS # BLD AUTO: 0 X10(3)/MCL (ref 0–0.2)
BASOPHILS NFR BLD AUTO: 0 %
EOSINOPHIL # BLD AUTO: 0.2 X10(3)/MCL (ref 0–0.9)
EOSINOPHIL NFR BLD AUTO: 2 %
ERYTHROCYTE [DISTWIDTH] IN BLOOD BY AUTOMATED COUNT: 14.5 % (ref 11.5–17)
HCT VFR BLD AUTO: 27.2 % (ref 37–47)
HGB BLD-MCNC: 9 GM/DL (ref 12–16)
LYMPHOCYTES # BLD AUTO: 3.4 X10(3)/MCL (ref 0.6–4.6)
LYMPHOCYTES NFR BLD AUTO: 42 %
MCH RBC QN AUTO: 29.1 PG (ref 27–31)
MCHC RBC AUTO-ENTMCNC: 33.1 GM/DL (ref 33–36)
MCV RBC AUTO: 88 FL (ref 80–94)
MONOCYTES # BLD AUTO: 0.6 X10(3)/MCL (ref 0.1–1.3)
MONOCYTES NFR BLD AUTO: 7 %
NEUTROPHILS # BLD AUTO: 3.78 X10(3)/MCL (ref 2.1–9.2)
NEUTROPHILS NFR BLD AUTO: 48 %
PLATELET # BLD AUTO: 170 X10(3)/MCL (ref 130–400)
PMV BLD AUTO: 12.2 FL (ref 9.4–12.4)
RBC # BLD AUTO: 3.09 X10(6)/MCL (ref 4.2–5.4)
TROP 6HR (OHS): <0.01 NG/ML (ref 0–0.04)
TROP IP BASE (OHS): NORMAL NG/ML (ref 0–0.04)
WBC # SPEC AUTO: 7.9 X10(3)/MCL (ref 4.5–11.5)

## 2021-12-03 LAB
ABS NEUT (OLG): 3.59 X10(3)/MCL (ref 2.1–9.2)
ALBUMIN SERPL-MCNC: 3.2 GM/DL (ref 3.5–5)
ALBUMIN/GLOB SERPL: 1.4 RATIO (ref 1.1–2)
ALP SERPL-CCNC: 34 UNIT/L (ref 40–150)
ALT SERPL-CCNC: 5 UNIT/L (ref 0–55)
AST SERPL-CCNC: 8 UNIT/L (ref 5–34)
BASOPHILS # BLD AUTO: 0 X10(3)/MCL (ref 0–0.2)
BASOPHILS NFR BLD AUTO: 0 %
BILIRUB SERPL-MCNC: 0.4 MG/DL
BILIRUBIN DIRECT+TOT PNL SERPL-MCNC: 0.2 MG/DL (ref 0–0.5)
BILIRUBIN DIRECT+TOT PNL SERPL-MCNC: 0.2 MG/DL (ref 0–0.8)
BUN SERPL-MCNC: 7.9 MG/DL (ref 7–18.7)
CALCIUM SERPL-MCNC: 8 MG/DL (ref 8.7–10.5)
CHLORIDE SERPL-SCNC: 108 MMOL/L (ref 98–107)
CHOLEST SERPL-MCNC: 128 MG/DL
CHOLEST/HDLC SERPL: 3 {RATIO} (ref 0–5)
CO2 SERPL-SCNC: 26 MMOL/L (ref 22–29)
CREAT SERPL-MCNC: 0.6 MG/DL (ref 0.55–1.02)
EOSINOPHIL # BLD AUTO: 0.4 X10(3)/MCL (ref 0–0.9)
EOSINOPHIL NFR BLD AUTO: 5 %
ERYTHROCYTE [DISTWIDTH] IN BLOOD BY AUTOMATED COUNT: 14.1 % (ref 11.5–17)
GLOBULIN SER-MCNC: 2.3 GM/DL (ref 2.4–3.5)
GLUCOSE SERPL-MCNC: 87 MG/DL (ref 74–100)
HCT VFR BLD AUTO: 27.1 % (ref 37–47)
HDLC SERPL-MCNC: 44 MG/DL (ref 35–60)
HGB BLD-MCNC: 8.5 GM/DL (ref 12–16)
LDLC SERPL CALC-MCNC: 72 MG/DL (ref 50–140)
LYMPHOCYTES # BLD AUTO: 2.3 X10(3)/MCL (ref 0.6–4.6)
LYMPHOCYTES NFR BLD AUTO: 34 %
MAGNESIUM SERPL-MCNC: 1.9 MG/DL (ref 1.6–2.6)
MCH RBC QN AUTO: 28.8 PG (ref 27–31)
MCHC RBC AUTO-ENTMCNC: 31.4 GM/DL (ref 33–36)
MCV RBC AUTO: 91.9 FL (ref 80–94)
MONOCYTES # BLD AUTO: 0.4 X10(3)/MCL (ref 0.1–1.3)
MONOCYTES NFR BLD AUTO: 6 %
NEUTROPHILS # BLD AUTO: 3.59 X10(3)/MCL (ref 2.1–9.2)
NEUTROPHILS NFR BLD AUTO: 53 %
PLATELET # BLD AUTO: 174 X10(3)/MCL (ref 130–400)
PMV BLD AUTO: 11.9 FL (ref 9.4–12.4)
POTASSIUM SERPL-SCNC: 3.3 MMOL/L (ref 3.5–5.1)
PROT SERPL-MCNC: 5.5 GM/DL (ref 6.4–8.3)
RBC # BLD AUTO: 2.95 X10(6)/MCL (ref 4.2–5.4)
SODIUM SERPL-SCNC: 139 MMOL/L (ref 136–145)
TRIGL SERPL-MCNC: 58 MG/DL (ref 37–140)
VLDLC SERPL CALC-MCNC: 12 MG/DL
WBC # SPEC AUTO: 6.7 X10(3)/MCL (ref 4.5–11.5)

## 2021-12-15 ENCOUNTER — PATIENT OUTREACH (OUTPATIENT)
Dept: EMERGENCY MEDICINE | Facility: HOSPITAL | Age: 26
End: 2021-12-15

## 2021-12-21 ENCOUNTER — HISTORICAL (OUTPATIENT)
Dept: RADIOLOGY | Facility: HOSPITAL | Age: 26
End: 2021-12-21

## 2021-12-22 ENCOUNTER — PATIENT OUTREACH (OUTPATIENT)
Dept: EMERGENCY MEDICINE | Facility: HOSPITAL | Age: 26
End: 2021-12-22

## 2022-01-25 ENCOUNTER — HISTORICAL (OUTPATIENT)
Dept: RADIOLOGY | Facility: HOSPITAL | Age: 27
End: 2022-01-25

## 2022-01-25 LAB
ABS NEUT (OLG): 3.37 X10(3)/MCL (ref 2.1–9.2)
ALBUMIN SERPL-MCNC: 4.1 GM/DL (ref 3.5–5)
ALBUMIN/GLOB SERPL: 1.5 RATIO (ref 1.1–2)
ALP SERPL-CCNC: 53 UNIT/L (ref 40–150)
ALT SERPL-CCNC: 6 UNIT/L (ref 0–55)
AMYLASE SERPL-CCNC: 18 UNIT/L (ref 25–125)
AST SERPL-CCNC: 9 UNIT/L (ref 5–34)
BASOPHILS # BLD AUTO: 0 X10(3)/MCL (ref 0–0.2)
BASOPHILS NFR BLD AUTO: 0 %
BILIRUB SERPL-MCNC: 0.5 MG/DL
BILIRUBIN DIRECT+TOT PNL SERPL-MCNC: 0.2 MG/DL (ref 0–0.5)
BILIRUBIN DIRECT+TOT PNL SERPL-MCNC: 0.3 MG/DL (ref 0–0.8)
BUN SERPL-MCNC: 8.1 MG/DL (ref 7–18.7)
CALCIUM SERPL-MCNC: 9.1 MG/DL (ref 8.7–10.5)
CANCER AG19-9 SERPL-ACNC: 10.71 UNIT/ML (ref 0–37)
CHLORIDE SERPL-SCNC: 105 MMOL/L (ref 98–107)
CO2 SERPL-SCNC: 24 MMOL/L (ref 22–29)
CREAT SERPL-MCNC: 0.61 MG/DL (ref 0.55–1.02)
EOSINOPHIL # BLD AUTO: 0.2 X10(3)/MCL (ref 0–0.9)
EOSINOPHIL NFR BLD AUTO: 3 %
ERYTHROCYTE [DISTWIDTH] IN BLOOD BY AUTOMATED COUNT: 13.5 % (ref 11.5–17)
ERYTHROCYTE [SEDIMENTATION RATE] IN BLOOD: 6 MM/HR (ref 0–20)
GLOBULIN SER-MCNC: 2.8 GM/DL (ref 2.4–3.5)
GLUCOSE SERPL-MCNC: 83 MG/DL (ref 74–100)
HCT VFR BLD AUTO: 35.6 % (ref 37–47)
HGB BLD-MCNC: 11.1 GM/DL (ref 12–16)
LIPASE SERPL-CCNC: 12 U/L
LYMPHOCYTES # BLD AUTO: 2.4 X10(3)/MCL (ref 0.6–4.6)
LYMPHOCYTES NFR BLD AUTO: 37 %
MAGNESIUM SERPL-MCNC: 2 MG/DL (ref 1.6–2.6)
MCH RBC QN AUTO: 27.5 PG (ref 27–31)
MCHC RBC AUTO-ENTMCNC: 31.2 GM/DL (ref 33–36)
MCV RBC AUTO: 88.1 FL (ref 80–94)
MONOCYTES # BLD AUTO: 0.6 X10(3)/MCL (ref 0.1–1.3)
MONOCYTES NFR BLD AUTO: 8 %
NEUTROPHILS # BLD AUTO: 3.37 X10(3)/MCL (ref 2.1–9.2)
NEUTROPHILS NFR BLD AUTO: 51 %
PLATELET # BLD AUTO: 216 X10(3)/MCL (ref 130–400)
PMV BLD AUTO: 12.7 FL (ref 9.4–12.4)
POTASSIUM SERPL-SCNC: 4.1 MMOL/L (ref 3.5–5.1)
PROT SERPL-MCNC: 6.9 GM/DL (ref 6.4–8.3)
RBC # BLD AUTO: 4.04 X10(6)/MCL (ref 4.2–5.4)
SODIUM SERPL-SCNC: 137 MMOL/L (ref 136–145)
TSH SERPL-ACNC: 2.23 UIU/ML (ref 0.35–4.94)
WBC # SPEC AUTO: 6.5 X10(3)/MCL (ref 4.5–11.5)

## 2022-02-23 ENCOUNTER — PATIENT OUTREACH (OUTPATIENT)
Dept: EMERGENCY MEDICINE | Facility: HOSPITAL | Age: 27
End: 2022-02-23

## 2022-03-23 ENCOUNTER — PATIENT OUTREACH (OUTPATIENT)
Dept: EMERGENCY MEDICINE | Facility: HOSPITAL | Age: 27
End: 2022-03-23

## 2022-03-23 ENCOUNTER — HISTORICAL (OUTPATIENT)
Dept: ADMINISTRATIVE | Facility: HOSPITAL | Age: 27
End: 2022-03-23

## 2022-03-27 ENCOUNTER — HISTORICAL (OUTPATIENT)
Dept: ADMINISTRATIVE | Facility: HOSPITAL | Age: 27
End: 2022-03-27

## 2022-03-30 ENCOUNTER — PATIENT OUTREACH (OUTPATIENT)
Dept: EMERGENCY MEDICINE | Facility: HOSPITAL | Age: 27
End: 2022-03-30

## 2022-04-08 ENCOUNTER — HISTORICAL (OUTPATIENT)
Dept: RADIOLOGY | Facility: HOSPITAL | Age: 27
End: 2022-04-08

## 2022-04-08 ENCOUNTER — HISTORICAL (OUTPATIENT)
Dept: ADMINISTRATIVE | Facility: HOSPITAL | Age: 27
End: 2022-04-08

## 2022-04-11 ENCOUNTER — HISTORICAL (OUTPATIENT)
Dept: ADMINISTRATIVE | Facility: HOSPITAL | Age: 27
End: 2022-04-11
Payer: MEDICAID

## 2022-04-29 VITALS
SYSTOLIC BLOOD PRESSURE: 99 MMHG | BODY MASS INDEX: 22.38 KG/M2 | HEIGHT: 60 IN | HEIGHT: 61 IN | DIASTOLIC BLOOD PRESSURE: 66 MMHG | OXYGEN SATURATION: 100 % | SYSTOLIC BLOOD PRESSURE: 99 MMHG | BODY MASS INDEX: 23.22 KG/M2 | WEIGHT: 123 LBS | DIASTOLIC BLOOD PRESSURE: 53 MMHG | WEIGHT: 114 LBS

## 2022-04-30 NOTE — ED PROVIDER NOTES
Patient:   Royce Quigley            MRN: [id number]            FIN: [id number]               Age:   26 years     Sex:  Female     :  1995   Associated Diagnoses:   Pelvic pain in female   Author:   Dany Hawley      Basic Information   Time seen: Immediately upon arrival.   History source: Patient.   Arrival mode: Private vehicle.   History limitation: None.   Additional information: Chief Complaint from Nursing Triage Note : Chief Complaint   2021 0:49 CST      Chief Complaint           Pt reports right lower pelvic pain after sex, not on BC and unprotected sex with same partner, recent UTI.  .   Provider/Visit info:   Time Seen:  Dany Hawley / 2021 00:58  .   History of Present Illness   The patient presents with pelvic pain.  The onset was 1  hours ago.  The course/duration of symptoms is constant.  Location: Right pelvis. Radiating pain: right abdomen.  The character of symptoms is achy.  The degree at onset was moderate.  The degree at maximum was moderate.  The degree at present is moderate.  There are exacerbating factors including movement and standing.  The relieving factor is none.  Risk factors consist of none.  Prior episodes: none.  Therapy today: none.  Associated symptoms: nausea.  Additional history: Pt is a 26 yr old female who presents to the [facility] ED reporting pelvic pain which began during intercourse tonight. Denies vaginal bleeding or discharge. Denies chest pain, SOB, weakness, dizziness, or fever. Pt suffers from chronic abdominal pain. Reports current symptoms is different from previous flares. .        Review of Systems   Constitutional symptoms:  Negative except as documented in HPI.   Skin symptoms:  Negative except as documented in HPI.   Eye symptoms:  Negative except as documented in HPI.   ENMT symptoms:  Negative except as documented in HPI.   Respiratory symptoms:  Negative except as documented in HPI.   Cardiovascular symptoms:  Negative  except as documented in HPI.   Gastrointestinal symptoms:  Negative except as documented in HPI.   Genitourinary symptoms:  Negative except as documented in HPI.   Musculoskeletal symptoms:  Negative except as documented in HPI.   Neurologic symptoms:  Negative except as documented in HPI.   Psychiatric symptoms:  Negative except as documented in HPI.   Endocrine symptoms:  Negative except as documented in HPI.   Hematologic/Lymphatic symptoms:  Negative except as documented in HPI.   Allergy/immunologic symptoms:  Negative except as documented in HPI.      Health Status   Allergies:    Allergic Reactions (Selected)  Severity Not Documented  Benadryl- Felt funny.,    Allergies (1) Active Reaction  Benadryl felt funny  .   Medications:  (Selected)   Inpatient Medications  Ordered  Toradol: 30 mg, form: Injection, IM, Once, first dose 11/30/21 1:19:00 CST, stop date 11/30/21 1:19:00 CST, STAT  Prescriptions  Prescribed  Bentyl 10 mg oral capsule: 10 mg = 1 cap(s), Oral, QID, # 28 cap(s), 0 Refill(s), Pharmacy: [facility] #[id number], 152, cm, Height/Length Dosing, 09/16/21 10:56:00 CDT, 53, kg, Weight Dosing, 11/23/21 11:15:00 CST  Carafate 1 g oral tablet: 1 gm = 1 tab(s), Oral, QID, # 28 tab(s), 0 Refill(s), Pharmacy: [facility] #[id number], 152, cm, Height/Length Dosing, 09/16/21 10:56:00 CDT, 53, kg, Weight Dosing, 11/23/21 11:15:00 CST  Macrobid 100 mg oral capsule: 100 mg = 1 cap(s), Oral, BID, X 7 day(s), # 14 cap(s), 0 Refill(s), Pharmacy: [facility] #[id number], 152, cm, Height/Length Dosing, 09/16/21 10:56:00 CDT, 53, kg, Weight Dosing, 11/23/21 11:15:00 CST  Protonix 40 mg ORAL enteric coated tablet: 40 mg = 1 tab(s), Oral, Daily, # 30 tab(s), 0 Refill(s), Pharmacy: [facility] #[id number], 152, cm, Height/Length Dosing, 09/16/21 10:56:00 CDT, 53, kg, Weight Dosing, 11/23/21 11:15:00 CST  indomethacin 50 mg oral capsule: 50 mg = 1 cap(s), Oral, TID, # 15 cap(s), 0 Refill(s), [facility]:  [id number], 152, cm, Height/Length Dosing, 06/22/21 13:59:00 CDT, 58, kg, Weight Dosing, 08/03/21 19:13:00 CDT  methIMAzole 5 mg oral tablet: 5 mg = 1 tab(s), Oral, Daily, # 30 tab(s), 1 Refill(s), Pharmacy: [facility], 152, cm, Height/Length Dosing, 06/22/21 13:59:00 CDT, 49.6, kg, Weight Dosing, 06/22/21 13:59:00 CDT  propranolol 10 mg oral tablet: 10 mg = 1 tab(s), Oral, BID, # 60 tab(s), 4 Refill(s), Pharmacy: [facility], 152, cm, Height/Length Dosing, 06/22/21 13:59:00 CDT, 49.6, kg, Weight Dosing, 06/22/21 13:59:00 CDT  Documented Medications  Documented  Cymbalta 60 mg oral delayed release capsule: 60 mg = 1 cap(s), Oral, Daily, (do not crush or chew), 0 Refill(s)  hydrOXYzine pamoate 25 mg oral capsule: 25 mg = 1 cap(s), Oral, TID  prednisONE 50 mg oral tablet: 50 mg = 1 tab(s), Oral, Daily, per nurse's notes.   Immunizations: Per nurse's notes.   Menstrual history: Per nurse's notes.      Past Medical/ Family/ Social History   Medical history:    Active  Graves' disease ([id number]), Reviewed as documented in chart.   Surgical history:    Transesophageal Echo (for [facility]) (None) on 1/17/2018 at 22 Years.  Comments:  1/17/2018 17:56 CARMELO - Alexey RIVERA, Tammy VALENZUELA  auto-populated from documented surgical case  Tonsils obstructive ([id number]).  Tonsillectomy ([id number])., Reviewed as documented in chart.   Family history:    Pancreatic cancer  Grandmother  Bipolar  Mother  Heart failure.  Father  Alcoholism.  Father  Depression.  Mother  Anxiety.  Mother  Drug addiction.  Mother  Ovarian cancer  Grandmother  Cervical cancer  Mother  .   Social history: Alcohol use: Denies, Tobacco use: Denies, Drug use: Denies.   Problem list:    Active Problems (7)  Acute disease or injury-related malnutrition   Graves disease   Graves' disease   Hyperthyroidism   Knowledge deficit   None   Tobacco user   .      Physical Examination               Vital Signs   Vital Signs    11/30/2021 0:49 CST      Temperature Oral          37.4 DegC                             Temperature Oral (calculated)             99.32 DegF                             Peripheral Pulse Rate     105 bpm  HI                             Respiratory Rate          21 br/min                             SpO2                      94 %                             Oxygen Therapy            Room air                             Systolic Blood Pressure   110 mmHg                             Diastolic Blood Pressure  69 mmHg  .      Vital Signs (last 24 hrs)_____  Last Charted___________  Temp Oral     37.4 DegC  (NOV 30 00:49)  Heart Rate Peripheral   H 105bpm  (NOV 30 00:49)  Resp Rate         21 br/min  (NOV 30 00:49)  SBP      110 mmHg  (NOV 30 00:49)  DBP      69 mmHg  (NOV 30 00:49)  SpO2      94 %  (NOV 30 00:49)  Weight      51 kg  (NOV 30 00:49)  .   Measurements   11/30/2021 0:49 CST      Weight Dosing             51 kg                             Weight Measured           51 kg                             Weight Measured and Calculated in Lbs     112.43 lb  .   Basic Oxygen Information   11/30/2021 0:49 CST      SpO2                      94 %                             Oxygen Therapy            Room air  .   General:  Alert, mild distress.    Skin:  Warm, dry, pink, intact.    Head:  Normocephalic, atraumatic.    Neck:  Supple, trachea midline, no tenderness.    Eye:  Pupils are equal, round and reactive to light, extraocular movements are intact, normal conjunctiva, vision unchanged.    Ears, nose, mouth and throat:  Tympanic membranes clear, oral mucosa moist, no pharyngeal erythema or exudate.    Cardiovascular:  Regular rate and rhythm, No murmur, Normal peripheral perfusion, No edema.    Respiratory:  Lungs are clear to auscultation, respirations are non-labored, breath sounds are equal, Symmetrical chest wall expansion.    Chest wall:  No tenderness, No deformity.    Back:  Nontender, Normal range of motion,  Normal alignment, No costovertebral angle tenderness,    Musculoskeletal:  Normal ROM, normal strength, no tenderness, no swelling.    Gastrointestinal:  Soft, Nontender, Non distended, Normal bowel sounds, Guarding: Negative, Rebound: Negative, Organomegaly: Negative, Mass: Negative, Signs: McBurney's negative, Painter's negative.    Genitourinary:  External genitalia: Normal, Speculum exam: Vaginal, moderate, tenderness, Bimanual exam: Right, adnexa, tenderness, not enlarged, no fullness.    Neurological:  Alert and oriented to person, place, time, and situation, No focal neurological deficit observed, CN II-XII intact.    Lymphatics:  No lymphadenopathy.   Psychiatric:  Cooperative, appropriate mood & affect, normal judgment.       Medical Decision Making   Differential Diagnosis:  Pelvic pain, ovarian cyst, urinary tract infection, pelvic inflammatory disease, venereal disease.    Documents reviewed:  Emergency department nurses' notes, emergency department records, prior records.    Results review:     Labs (Last four charted values)  WBC                  8.3 (NOV 30)   Hgb                  L 11.6 (NOV 30)   Hct                  35.9 (NOV 30)   Plt                  217 (NOV 30) .      Impression and Plan   Diagnosis   Pelvic pain in female ([id number] R10.2)   Plan   Disposition: Patient care transitioned to: Time: 11/30/2021 01:55:00, Kenya MALHOTRA, Juan Carlos MONTE

## 2022-04-30 NOTE — H&P
Patient:   Royce Quigley            MRN: [id number]            FIN: [id number]               Age:   26 years     Sex:  Female     :  1995   Associated Diagnoses:   None   Author:   Krysten Odonnell MD      Basic Information   presented to [facility] on POD#1 after surgery with gyn at [facility] for lsc evacuation of hematoperitoneum from R hemorrhagic cyst of ovary which was hemostatic at time of surgery      Chief Complaint       2021 22:37 CST     to ED via EMS with c/o upper abdominal pain. pt states had sx for ruptured cyst today at [facility]. +nausea    2021 0:49 CST      Pt reports right lower pelvic pain after sex, not on BC and unprotected sex with same partner, recent UTI.        History of Present Illness   Pt c/o left shoulder and midepigastric gas/colicky pain. She also is with SOA and anemia symptoms. She had 1500cc hematoperitoneum per op note. No VB or fevers. Pain only moderately controlled with meds. ER physician called with plans for transfusion of blood due to symptomatic anemia and obs for pain control.        Review of Systems   Constitutional:  Negative.    Respiratory:  Shortness of breath.    Cardiovascular:  Chest pain.    Gastrointestinal:  no nausea or vomiting.         Abdominal pain: Epigastric area, The severity is moderate, Characterized as ( Cramping/colicky ).    Genitourinary:  Negative.    Gynecologic:  Negative.    Hematology/Lymphatics:  Negative.    Endocrine:  Negative.    Immunologic:  Negative.    Musculoskeletal:  Negative.    Neurologic:  Negative.    Psychiatric:  Negative.    ROS reviewed as documented in chart      Health Status   Allergies:    Allergic Reactions (Selected)  Severity Not Documented  Benadryl- Felt funny.   Current medications:  (Selected)   Inpatient Medications  Ordered  Colace 100 mg oral capsule: 100 mg, form: Cap, Oral, BID, first dose 21 9:00:00 CST  Dilaudid: 0.5 mg, form: Injection, IV Push, q4hr PRN for breakthru pain, first dose  21 4:14:00 CST  Motrin (Advil) Oral Tab.: 800 mg, form: Tab, Oral, q8hr, first dose 21 5:00:00 CST  Percocet 5/325: 1 tab(s), form: Tab, Oral, q4hr PRN for pain, first dose 21 5:13:00 CST  Zofran 2 mg/mL injectable solution: 2 mg, form: Injection, IV Push, q4hr PRN for nausea, first dose 21 5:13:00 CST  ferrous sulfate 325 mg (65 mg elemental iron) oral enteric coated tablet: 325 mg, form: Tab, Oral, BIDWMeal, first dose 21 8:00:00 CST  simethicone 80 mg Chew Tab ( Mylanta Gas ): 160 mg, form: Tab-Chew, Oral, TID, first dose 21 6:00:00 CST  simethicone 80 mg Chew Tab ( Mylanta Gas ): 80 mg, form: Tab-Chew, Oral, q4hr PRN for other (see comment), first dose 21 1:17:00 CST, STAT  Prescriptions  Prescribed  Bentyl 10 mg oral capsule: 10 mg = 1 cap(s), Oral, QID, # 28 cap(s), 0 Refill(s), Pharmacy: [facility] #[id number], 152, cm, Height/Length Dosing, 21 10:56:00 CDT, 53, kg, Weight Dosing, 21 11:15:00 CST  Carafate 1 g oral tablet: 1 gm = 1 tab(s), Oral, QID, # 28 tab(s), 0 Refill(s), Pharmacy: [facility] #[id number], 152, cm, Height/Length Dosing, 21 10:56:00 CDT, 53, kg, Weight Dosing, 21 11:15:00 CST  Norco 5 mg-325 mg oral tablet: 1 tab(s), Oral, q6hr, PRN PRN as needed for pain, X 5 day(s), # 7 tab(s), 0 Refill(s), Pharmacy: [facility] #[id number], 157, cm, Height/Length Dosing, 21 9:23:00 CST, 51, kg, Weight Dosing, 21 9:23:00 CST  Protonix 40 mg ORAL enteric coated tablet: 40 mg = 1 tab(s), Oral, Daily, # 30 tab(s), 0 Refill(s), Pharmacy: [facility] #[id number], 152, cm, Height/Length Dosing, 21 10:56:00 CDT, 53, kg, Weight Dosing, 21 11:15:00 CST  Zofran ODT 4 mg oral tablet, disintegratin mg = 1 tab(s), Oral, q6hr, PRN PRN nausea/vomiting, X 5 day(s), # 10 tab(s), 0 Refill(s), Pharmacy: [facility] #[id number], 157, cm, Height/Length Dosing, 21 9:23:00 CST, 51, kg, Weight Dosing,  11/30/21 9:23:00 CST  ibuprofen 800 mg oral tablet: 800 mg = 1 tab(s), Oral, q8hr, # 30 tab(s), 0 Refill(s), Pharmacy: [email or web address] #[id number], 157, cm, Height/Length Dosing, 11/30/21 9:23:00 CST, 51, kg, Weight Dosing, 11/30/21 9:23:00 CST  ibuprofen 800 mg oral tablet: 800 mg = 1 tab(s), Oral, q8hr, PRN PRN as needed for pain, not to exceed 3200 mg/day, X 10 day(s), # 30 tab(s), 0 Refill(s)  indomethacin 50 mg oral capsule: 50 mg = 1 cap(s), Oral, TID, # 15 cap(s), 0 Refill(s), Pharmacy: [facility] #[id number], 152, cm, Height/Length Dosing, 06/22/21 13:59:00 CDT, 58, kg, Weight Dosing, 08/03/21 19:13:00 CDT  methIMAzole 5 mg oral tablet: 5 mg = 1 tab(s), Oral, Daily, # 30 tab(s), 1 Refill(s), Pharmacy: [facility], 152, cm, Height/Length Dosing, 06/22/21 13:59:00 CDT, 49.6, kg, Weight Dosing, 06/22/21 13:59:00 CDT  propranolol 10 mg oral tablet: 10 mg = 1 tab(s), Oral, BID, # 60 tab(s), 4 Refill(s), Pharmacy: [facility], 152, cm, Height/Length Dosing, 06/22/21 13:59:00 CDT, 49.6, kg, Weight Dosing, 06/22/21 13:59:00 CDT  Documented Medications  Documented  Cymbalta 60 mg oral delayed release capsule: 60 mg = 1 cap(s), Oral, Daily, (do not crush or chew), 0 Refill(s)  hydrOXYzine pamoate 25 mg oral capsule: 25 mg = 1 cap(s), Oral, TID  prednisONE 50 mg oral tablet: 50 mg = 1 tab(s), Oral, Daily,    Medications (8) Active  Scheduled: (4)  docusate sodium 100 mg Cap UD  100 mg 1 cap(s), Oral, BID  ferrous sulfate 325 mg Tab UD  325 mg 1 tab(s), Oral, BIDWMeal  ibuprofen 800 mg Tab UD  800 mg 1 tab(s), Oral, q8hr  simethicone 80 mg Chew  160 mg 2 tab(s), Oral, TID  Continuous: (0)  PRN: (4)  acetaminophen-oxycodone 325 mg-5 mg Tab UD  1 tab(s), Oral, q4hr  hydromorphone 2 mg/ml Inj (per mL)  0.5 mg 0.25 mL, IV Push, q4hr  ondansetron 2 mg/mL inj - 2mL  2 mg 1 mL, IV Push, q4hr  simethicone 80 mg Chew  80 mg 1 tab(s), Oral, q4hr     Problem list:    All Problems  Acute  disease or injury-related malnutrition / SNOMED CT 416784496545343 / Confirmed  Graves disease / SNOMED CT 1266085561 / Confirmed  Graves' disease / SNOMED CT 302700386 / Confirmed  Hyperthyroidism / SNOMED CT 49828353 / Confirmed  Knowledge deficit / SNOMED CT 4888645196 / Confirmed  None / SNOMED CT 173763099 / Confirmed  Tobacco user / SNOMED CT 014717230 / Probable,    Active Problems (7)  Acute disease or injury-related malnutrition   Graves disease   Graves' disease   Hyperthyroidism   Knowledge deficit   None   Tobacco user         Histories   Family History:    Pancreatic cancer  Grandmother  Bipolar  Mother  Heart failure.  Father  Alcoholism.  Father  Depression.  Mother  Anxiety.  Mother  Drug addiction.  Mother  Ovarian cancer  Grandmother  Cervical cancer  Mother     Procedure history:    Diagnostic Laparoscopic (None) on 11/30/2021 at 26 Years.  Comments:  11/30/2021 15:26 CARMELO Castillo RN, Rosa Maria RIVERA  auto-populated from documented surgical case  Transesophageal Echo (for [facility]) (None) on 1/17/2018 at 22 Years.  Comments:  1/17/2018 17:56 Tammy Fish RN  auto-populated from documented surgical case  Tonsils obstructive ([id number]).  Tonsillectomy ([id number]).   Social History        Social & Psychosocial Habits    Alcohol  09/26/2012 Risk Assessment: Denies Alcohol Use      Comment: Never - 06/26/2019 13:29 - Yuly Moreno LPN    Employment/School  10/15/2020  Status: Unemployed    Exercise    Comment: Doesnt exercise - 06/26/2019 13:30 - Yuly Moreno LPN    Home/Environment  03/22/2021  Lives with: Children, Significant other, mother-in-law    Comment: grandmother, mother and 2 children in household - 10/15/2020 13:13 - Rosa Barnes RN    Nutrition/Health  06/26/2019  Home Diet Regular    Appetite Poor    Sexual  06/26/2019  What is your current gender identity? (Check all that apply) Identifies as female    01/28/2020  Sexually active: Yes    Current partners: 1    Do you  think of your sexual orientation as: Straight or heterosexual    What is your current gender identity? (Check all that apply) Identifies as female    History of STIs No    Substance Use  01/02/2016 Risk Assessment: Denies Substance Abuse    12/26/2017  Use: Never    Tobacco  09/26/2012 Risk Assessment: Denies Tobacco Use    09/16/2021  Use: 10 or more cigarettes (1/    Type: Cigarettes    Patient Wants Consult For Cessation Counseling Yes    Tobacco use per day: 20    11/30/2021  Use: 10 or more cigarettes (1/    Patient Wants Consult For Cessation Counseling N/A    Abuse/Neglect  09/16/2021  SHX Any signs of abuse or neglect No    Feels unsafe at home: No    Safe place to go: Yes    11/30/2021  SHX Any signs of abuse or neglect No    Feels unsafe at home: No    Safe place to go: Yes    Spiritual/Cultural  06/26/2019  Adventism Preference Oriental orthodox    01/28/2020  Adventism Preference Oriental orthodox  .        Physical Examination   Vital Signs   12/1/2021 3:09 CST       Peripheral Pulse Rate     114 bpm  HI                             Heart Rate Monitored      118 bpm  HI                             Respiratory Rate          18 br/min                             SpO2                      100 %                             Oxygen Therapy            Room air                             Systolic Blood Pressure   108 mmHg                             Diastolic Blood Pressure  53 mmHg  LOW                             Mean Arterial Pressure, Cuff              71 mmHg    12/1/2021 2:11 CST       Peripheral Pulse Rate     124 bpm  HI                             Respiratory Rate          21 br/min                             SpO2                      100 %                             Oxygen Therapy            Room air                             Systolic Blood Pressure   110 mmHg                             Diastolic Blood Pressure  67 mmHg                             Mean Arterial Pressure, Cuff              81 mmHg    12/1/2021  1:19 CST       Respiratory Rate          19 br/min    11/30/2021 22:53 CST     Peripheral Pulse Rate     130 bpm  HI                             Respiratory Rate          20 br/min                             SpO2                      100 %                             Oxygen Therapy            Room air                             Systolic Blood Pressure   103 mmHg                             Diastolic Blood Pressure  67 mmHg    11/30/2021 22:37 CST     Temperature Oral          37.1 DegC                             Temperature Oral (calculated)             98.78 DegF                             Peripheral Pulse Rate     140 bpm  HI                             Respiratory Rate          20 br/min                             SpO2                      100 %                             Oxygen Therapy            Room air                             Systolic Blood Pressure   126 mmHg                             Diastolic Blood Pressure  74 mmHg    11/30/2021 17:10 CST     Peripheral Pulse Rate     98 bpm                             Respiratory Rate          18 br/min                             SpO2                      100 %                             Systolic Blood Pressure   112 mmHg                             Diastolic Blood Pressure  73 mmHg    11/30/2021 16:40 CST     Peripheral Pulse Rate     101 bpm  HI                             Respiratory Rate          18 br/min                             SpO2                      100 %                             Systolic Blood Pressure   114 mmHg                             Diastolic Blood Pressure  74 mmHg    11/30/2021 16:10 CST     Temperature Oral          36.7 DegC                             Temperature Oral (calculated)             98.06 DegF                             Peripheral Pulse Rate     110 bpm  HI                             Respiratory Rate          18 br/min                             SpO2                      100 %                             Oxygen  Therapy            Room air                             Systolic Blood Pressure   122 mmHg                             Diastolic Blood Pressure  70 mmHg    11/30/2021 15:55 CST     Peripheral Pulse Rate     98 bpm                             Respiratory Rate          14 br/min                             SpO2                      100 %                             Oxygen Therapy            Room air                             Systolic Blood Pressure   119 mmHg                             Diastolic Blood Pressure  81 mmHg                             Mean Arterial Pressure, Cuff              94 mmHg    11/30/2021 15:40 CST     Peripheral Pulse Rate     88 bpm                             Respiratory Rate          14 br/min                             SpO2                      100 %                             Systolic Blood Pressure   109 mmHg                             Diastolic Blood Pressure  70 mmHg                             Mean Arterial Pressure, Cuff              83 mmHg    11/30/2021 15:30 CST     Peripheral Pulse Rate     76 bpm                             Respiratory Rate          16 br/min                             SpO2                      100 %                             Systolic Blood Pressure   111 mmHg                             Diastolic Blood Pressure  69 mmHg                             Mean Arterial Pressure, Cuff              83 mmHg    11/30/2021 15:25 CST     Temperature Temporal Artery               36.4 DegC                             Peripheral Pulse Rate     77 bpm                             Respiratory Rate          14 br/min                             SpO2                      100 %                             Oxygen Therapy            Nasal cannula                             Oxygen Flow Rate          2 L/min                             Systolic Blood Pressure   93 mmHg                             Diastolic Blood Pressure  56 mmHg  LOW                             Mean Arterial  Pressure, Cuff              68 mmHg    11/30/2021 13:16 CST     Respiratory Rate          20 br/min  (Modified)   11/30/2021 12:32 CST     Peripheral Pulse Rate     99 bpm                             Heart Rate Monitored      98 bpm                             Respiratory Rate          24 br/min                             SpO2                      98 %                             Oxygen Therapy            Room air                             Systolic Blood Pressure   99 mmHg                             Diastolic Blood Pressure  73 mmHg                             Mean Arterial Pressure, Cuff              82 mmHg    11/30/2021 12:07 CST     Peripheral Pulse Rate     96 bpm                             Heart Rate Monitored      96 bpm                             Respiratory Rate          15 br/min                             SpO2                      100 %                             Oxygen Therapy            Room air                             Systolic Blood Pressure   93 mmHg                             Diastolic Blood Pressure  61 mmHg                             Mean Arterial Pressure, Cuff              72 mmHg    11/30/2021 12:00 CST     Respiratory Rate          16 br/min                             SpO2                      100 %    11/30/2021 11:54 CST     Peripheral Pulse Rate     109 bpm  HI                             Heart Rate Monitored      121 bpm  HI                             Respiratory Rate          20 br/min                             SpO2                      100 %                             Oxygen Therapy            Room air                             Systolic Blood Pressure   82 mmHg  LOW                             Diastolic Blood Pressure  56 mmHg  LOW                             Mean Arterial Pressure, Cuff              65 mmHg    11/30/2021 11:45 CST     Peripheral Pulse Rate     132 bpm  HI                             Heart Rate Monitored      132 bpm  HI                              Respiratory Rate          22 br/min                             SpO2                      100 %                             Oxygen Therapy            Room air                             Systolic Blood Pressure   79 mmHg  LOW                             Diastolic Blood Pressure  57 mmHg  LOW                             Mean Arterial Pressure, Cuff              64 mmHg    11/30/2021 11:44 CST     Peripheral Pulse Rate     132 bpm  HI                             Heart Rate Monitored      132 bpm  HI                             Respiratory Rate          22 br/min                             SpO2                      100 %                             Oxygen Therapy            Room air                             Systolic Blood Pressure   82 mmHg  LOW  (Modified)                            Diastolic Blood Pressure  56 mmHg  LOW  (Modified)                            Mean Arterial Pressure, Cuff              65 mmHg  (Modified)   11/30/2021 11:32 CST     Peripheral Pulse Rate     120 bpm  HI                             Heart Rate Monitored      120 bpm  HI                             Respiratory Rate          15 br/min                             SpO2                      100 %                             Oxygen Therapy            Room air    11/30/2021 11:26 CST     Peripheral Pulse Rate     122 bpm  HI                             Heart Rate Monitored      135 bpm  HI                             Respiratory Rate          17 br/min                             SpO2                      100 %    11/30/2021 10:30 CST     Peripheral Pulse Rate     112 bpm  HI                             Heart Rate Monitored      112 bpm  HI                             Respiratory Rate          19 br/min                             SpO2                      100 %                             Oxygen Therapy            Room air                             Systolic Blood Pressure   103 mmHg                             Diastolic Blood Pressure  71  mmHg                             Mean Arterial Pressure, Cuff              82 mmHg    11/30/2021 10:13 CST     Peripheral Pulse Rate     114 bpm  HI                             Heart Rate Monitored      114 bpm  HI                             Respiratory Rate          19 br/min                             SpO2                      99 %    11/30/2021 10:00 CST     SpO2                      99 %                             Oxygen Therapy            Room air                             Systolic Blood Pressure   98 mmHg                             Diastolic Blood Pressure  69 mmHg                             Mean Arterial Pressure, Cuff              79 mmHg    11/30/2021 9:59 CST      Respiratory Rate          14 br/min                             SpO2                      100 %    11/30/2021 9:23 CST      Respiratory Rate          26 br/min  HI                             SpO2                      100 %                             Oxygen Therapy            Room air    11/30/2021 9:14 CST      Respiratory Rate          18 br/min                             SpO2                      100 %    11/30/2021 8:00 CST      Peripheral Pulse Rate     109 bpm  HI                             Heart Rate Monitored      109 bpm  HI                             Respiratory Rate          15 br/min                             SpO2                      99 %                             Oxygen Therapy            Room air                             Systolic Blood Pressure   110 mmHg                             Diastolic Blood Pressure  63 mmHg                             Mean Arterial Pressure, Cuff              79 mmHg    11/30/2021 7:00 CST      Peripheral Pulse Rate     100 bpm                             Heart Rate Monitored      100 bpm                             Respiratory Rate          17 br/min                             SpO2                      100 %                             Oxygen Therapy            Room air                              Systolic Blood Pressure   102 mmHg                             Diastolic Blood Pressure  77 mmHg                             Mean Arterial Pressure, Cuff              85 mmHg    11/30/2021 6:44 CST      Temperature Temporal Artery               36.6 DegC                             Peripheral Pulse Rate     98 bpm                             Respiratory Rate          17 br/min                             SpO2                      100 %                             Oxygen Therapy            Room air                             Systolic Blood Pressure   109 mmHg                             Diastolic Blood Pressure  67 mmHg    11/30/2021 6:00 CST      Peripheral Pulse Rate     96 bpm                             Respiratory Rate          18 br/min                             SpO2                      98 %                             Oxygen Therapy            Room air                             Systolic Blood Pressure   100 mmHg                             Diastolic Blood Pressure  72 mmHg    11/30/2021 4:48 CST      Peripheral Pulse Rate     97 bpm                             Respiratory Rate          20 br/min                             SpO2                      97 %                             Oxygen Therapy            Room air                             Systolic Blood Pressure   99 mmHg                             Diastolic Blood Pressure  73 mmHg    11/30/2021 3:30 CST      Temperature Oral          37.4 DegC                             Temperature Oral (calculated)             99.32 DegF                             Peripheral Pulse Rate     98 bpm                             Respiratory Rate          19 br/min                             SpO2                      98 %                             Oxygen Therapy            Room air                             Systolic Blood Pressure   97 mmHg                             Diastolic Blood Pressure  59 mmHg  LOW    11/30/2021 0:49 CST      Temperature Oral           37.4 DegC                             Temperature Oral (calculated)             99.32 DegF                             Peripheral Pulse Rate     105 bpm  HI                             Respiratory Rate          21 br/min                             SpO2                      94 %                             Oxygen Therapy            Room air                             Systolic Blood Pressure   110 mmHg                             Diastolic Blood Pressure  69 mmHg     GEN AAOx3, NAD, normal pallor  PULM UNLABORED  ABD soft/NTTP uterus firm, below umbilicus  INC C/D/I no edema/erythema/induration (lsc sites x3)  EXT no BLE edema, nontender, neg Adia's BLE      Review / Management   prelim rads reviewed, significant decrease in hematoperitoneum, still with ~4cm R ovarian cyst, no evidence of active bleeding, postop changes/pneumatoperitoneum expected  pending CXR and CTA PE protocol    Labs (Last four charted values)  WBC                  H 13.0 (NOV 30)   Hgb                  L 7.4 (NOV 30)   Hct                  L 22.7 (NOV 30)   Plt                  183 (NOV 30)   Na                   L 135 (NOV 30)   K                    3.5 (NOV 30)   CO2                  26 (NOV 30)   Cl                   104 (NOV 30)   Cr                   0.58 (NOV 30)   BUN                  8.6 (NOV 30)   Glucose Random       H 132 (NOV 30)       Impression and Plan   Place in obs, appears normal postop pain, no acute abdomen with symptomatic anemia c/b anxiety  2u PRBC transfusion, repeat CBC and orthostatics ordered for post transfusion  recently dx UTI and did not take meds, will give 1g rocephin  percocet/motrin/simethicone/dilaudid for BT  Colace and Fe, tums/pepcid, home dose PRN anxiolytic  F/u final read of CTA and CXR  Antiemetics PRN  SCD BLE

## 2022-05-05 NOTE — HISTORICAL [facility]
This is a historical note converted from [facility]. Formatting and pictures may have been removed.  Please reference Charlene for original formatting and attached multimedia. Chief Complaint  establish PCP  History of Present Illness  22 year old  female here today to establish PCP. Pt has hx of graves disease and has been on Methimazole 5mg 2 tablets BID. Recent labs show pt is hypothyroid Free T4 0.40 TSH 10.10 previous labs in Nov Free T4 0.40 TSH 28.00. pt has been on Methimazole and has?not decreased. Pt reports excessive sweating, amena at night, swollen lymph nodes in her groin and neck that has been present 3 weeks. She has fatigue, headache, body aches, pt reports severe grumbling to her stomach that started 2 weeks ago with diarrhea for 3 weeks with large amounts mucous in her stools. The grandmother who lives in the house has also had diarrhea for 3 weeks. Pt has tachycardia, chest pressure and SOB with enlarged thyroid.  Review of Systems  All Systems Negative Except: See HPI  Physical Exam  General:? Alert and oriented, No acute distress, General health good  Eye:? Pupils are equal, round and reactive to light, Normal conjunctiva.?  HENT:? Normocephalic, Normal hearing, Oral mucosa is moist, No pharyngeal erythema.?  Neck:? Supple, Non-tender, No carotid bruit, No jugular venous distention, No lymphadenopathy, thyromegaly.?  Respiratory:? Lungs are clear to auscultation, Respirations are non-labored, Breath sounds are equal, Symmetrical chest wall expansion.?  Cardiovascular:? Tacycardia, Regular rhythm, No murmur, Good pulses equal in all extremities, Normal peripheral perfusion, No edema.?  Gastrointestinal:? Soft, Non-tender, Non-distended,?hyperactive bowel sounds, No organomegaly.?  Genitourinary:? No costovertebral angle tenderness.?  Lymphatics:? lymphadenopathy neck right posterior and bilateral anterior , axilla, groin x 5 right side.?  Musculoskeletal: Normal range of motion, Normal  strength, No tenderness, No deformity, Normal gait.?  Integumentary:? Warm, Dry.?  Neurologic:? Alert, Oriented.?  Psychiatric:? Cooperative, Appropriate mood & affect.?  ?   /74?    R 18  ?  Assessment/Plan  Diarrhea  Stool samples  Ordered:  Clinic Follow up, *Est. 02/02/18 3:00:00 CST, 6 weeks, Order for future visit, Enlarged lymph node  Diarrhea  Graves disease, [facility]  Fecal Leukocytes - Lactoferrin on stool, Routine collect, *Est. 12/29/17 3:00:00 CST, Stool, Order for future visit, *Est. Stop date 12/29/17 3:00:00 CST, Nurse collect, Diarrhea, day(s), 1, week(s), In Approximately, 12/22/17 11:28:00 CST  Ova and Parasites Exam ,[facility] [id number], Routine collect, *Est. 12/29/17 3:00:00 CST, Stool, Order for future visit, *Est. Stop date 12/29/17 3:00:00 CST, Nurse collect, Diarrhea, day(s), 1, week(s), In Approximately, 12/22/17 11:28:00 CST  Stool Culture, Routine collect, *Est. 12/29/17 3:00:00 CST, Order for future visit, Stool, Nurse collect, *Est. Stop date 12/29/17 3:00:00 CST, Diarrhea, day(s), 1, week(s), In Approximately  ?  Enlarged lymph node  Blood cultures, mono, E bar, HIV  Ordered:  Blood Culture, 12/22/17 11:45:00 CST, Routine collect, Blood, Arm R, by CLIENT, Venous Draw, Stop date 12/22/17 11:45:00 CST, Enlarged lymph node, On Exactly  CBC w/ Auto Diff, Routine collect, *Est. 12/22/17 3:00:00 CST, Blood, Order for future visit, *Est. Stop date 12/22/17 3:00:00 CST, Lab Collect, Enlarged lymph node, On Exactly, 12/22/17 11:09:00 CST  Clinic Follow up, *Est. 02/02/18 3:00:00 CST, 6 weeks, Order for future visit, Enlarged lymph node  Diarrhea  Graves disease, [facility]  Tanvi-Barr Virus (EBV) Acute Infection Abs Profile-[facility] [id number], Routine collect, 12/22/17 11:46:00 CST, Blood, Stop date 12/22/17 11:46:00 CST, Lab Collect, Venous Draw, Enlarged lymph node, Print Label By Order Location, On Exactly, 12/22/17 11:09:00 CST  Mononucleosis Screen, Routine collect,  12/22/17 11:46:00 CST, Blood, Stop date 12/22/17 11:46:00 CST, Lab Collect, Venous Draw, Enlarged lymph node, Print Label By Order Location, On Exactly, 12/22/17 11:10:00 CST  ?  Graves disease,?Graves disease  Labs today  refer to endocrine  Decrease Methimazole to one at night 2 in am  Ordered:  Antinuclear Antibodies by IFA-[facility] [id number], Routine collect, 12/22/17 11:45:00 CST, Blood, Stop date 12/22/17 11:45:00 CST, Lab Collect, Venous Draw, Graves disease, Print Label By Order Location, On Exactly, 12/22/17 11:05:00 CST  Clinic Follow up, *Est. 02/02/18 3:00:00 CST, 6 weeks, Order for future visit, Enlarged lymph node  Diarrhea  Graves disease, [facility]  CRP, Routine collect, 12/22/17 11:45:00 CST, Blood, Stop date 12/22/17 11:45:00 CST, Lab Collect, Venous Draw, Graves disease, Print Label By Order Location, On Exactly, 12/22/17 11:06:00 CST  Sedimentation Rate, Routine collect, 12/22/17 11:45:00 CST, Blood, Stop date 12/22/17 11:45:00 CST, Lab Collect, Venous Draw, Graves disease, Print Label By Order Location, On Exactly, 12/23/17 5:00:00 CST  Thyroglobulin with Anti-TG Ab-[facility] [id number], Routine collect, 12/22/17 11:47:00 CST, Blood, Stop date 12/22/17 11:47:00 CST, Lab Collect, Venous Draw, Graves disease, Print Label By Order Location, On Exactly, 12/22/17 11:21:00 CST  Thyroid Peroxidase (TPO) Abs-[facility] [id number], Routine collect, 12/22/17 11:47:00 CST, Blood, Stop date 12/22/17 11:47:00 CST, Lab Collect, Venous Draw, Graves disease, Print Label By Order Location, On Exactly, 12/22/17 11:16:00 CST  Thyroid Stimulating Hormone, Routine collect, 12/22/17 11:44:00 CST, Blood, Stop date 12/22/17 11:44:00 CST, Lab Collect, Venous Draw, Graves disease, Print Label By Order Location, On Exactly, 12/22/17 11:04:00 CST  ?  Lymphadenopathy  ?Blood cultures, mono, E bar, HIV  ?  Wellness examination,?Wellness examination  ?Labs  Ordered:  Chlamydia trach and N. gonorrhea PCR, Routine collect, Urine, 12/22/17  11:47:00 CST, Stop date 12/22/17 11:47:00 CST, Nurse collect, Other, Wellness examination, On Exactly  Free T4, Routine collect, 12/22/17 11:46:00 CST, Blood, Stop date 12/22/17 11:46:00 CST, Lab Collect, Venous Draw, Wellness examination, Print Label By Order Location, On Exactly, 12/22/17 11:11:00 CST  Hepatitis Panel [facility], Routine collect, 12/22/17 11:45:00 CST, Blood, Stop date 12/22/17 11:45:00 CST, Lab Collect, Venous Draw, Wellness examination, Print Label By Order Location, On Exactly, 12/22/17 11:04:00 CST  HIV 1 and 2, Routine collect, 12/22/17 11:44:00 CST, Blood, Stop date 12/22/17 11:44:00 CST, Lab Collect, Venous Draw, Wellness examination, Print Label By Order Location, On Exactly, 12/22/17 11:04:00 CST  Syphilis Antibody (with Reflex RPR), Routine collect, 12/22/17 11:47:00 CST, Blood, Stop date 12/22/17 11:47:00 CST, Lab Collect, Venous Draw, Wellness examination, Print Label By Order Location, On Exactly, 12/22/17 11:27:00 CST  T3 Uptake, Routine collect, 12/22/17 11:46:00 CST, Blood, Stop date 12/22/17 11:46:00 CST, Lab Collect, Venous Draw, Wellness examination, Print Label By Order Location, On Exactly, 12/22/17 11:12:00 CST  T4, Routine collect, 12/22/17 11:46:00 CST, Blood, Stop date 12/22/17 11:46:00 CST, Lab Collect, Venous Draw, Wellness examination, Print Label By Order Location, On Exactly, 12/22/17 11:11:00 CST  Thyroid Stimulating Hormone, Routine collect, *Est. 12/22/17 3:00:00 CST, Blood, Order for future visit, *Est. Stop date 12/22/17 3:00:00 CST, Lab Collect, Wellness examination, On Exactly, 12/22/17 11:11:00 CST  Urine Culture [id number], Routine collect, 12/22/17 11:47:00 CST, Urine, Nurse collect, by CLIENT, Urine, Stop date 12/22/17 11:47:00 CST, Wellness examination, On Exactly  ?   Problem List/Past Medical History  Ongoing  Graves disease  Graves disease  None  Tobacco user  Historical  Procedure/Surgical History  Tonsils obstructive  Medications  CITALOPRAM HYDROBROMIDE 20  MG TABS, 20 mg= 1 tab(s), Oral, Daily  fluticasone 50 mcg/inh nasal spray, 1 spray(s), Nasal, BID  IBUPROFEN 800 MG TABS, 800 mg= 1 tab(s), Oral, TID  LANSOPRAZOLE 15 MG CPDR, 15 mg= 1 cap(s), Oral, Daily  LORATADINE 10 MG TABS, 10 mg= 1 tab(s), Oral, Daily  LORAZEPAM 1 MG TABS  METHIMAZOLE 5 MG TABS  METRONIDAZOLE 250 MG TABS, 250 mg= 1 tab(s), Oral, TID,? ?Not Taking, Completed Rx  propranolol 10 mg tablet, 10 mg= 1 tab(s), Oral, BID  PROPRANOLOL 20MG TABLETS, 20 mg= 1 tab(s), Oral, BID  Proventil HFA 90 mcg/inh inhalation aerosol with adapter, 2 puff(s), INH, q4hr, PRN,? ?Not taking: Last Dose Date/Time Unknown  Zoloft 50 mg oral tablet, 50 mg= 1 tab(s), Oral, Daily  Allergies  No Known Allergies  Social History  Alcohol - Denies Alcohol Use, 12/16/2017  Never  Substance Abuse - Denies Substance Abuse, 12/16/2017  Never  Tobacco - Denies Tobacco Use, 12/16/2017  Current every day smoker, Cigarettes, 20 per day.  Family History  Alcoholism.: Father.  Anxiety.: Mother.  Bipolar: Mother.  Cardiac arrest.: Negative: Father.  Cervical cancer: Mother.  Depression.: Mother.  Drug addiction.: Mother.  Heart failure.: Father.  Ovarian cancer: Grandmother.  Pancreatic cancer: Grandmother.  Diagnostic Results  ?? ? ? ??  * Final Report *  ?  Reason For Exam  Anterior neck swollen and tender. Hx of Graves disease.;Other (please specify)  ?  Radiology Report  Indication: Neck swelling, history of Graves disease  ?  FINDINGS: Continuous axial images were performed through the soft  tissues of the neck from the level of the skull base to the aortic  arch following administration of IV contrast. Reformatted coronal and  sagittal images were also obtained. No prior studies are available for  comparison.  ?  The structures of the brain at the skull base appear unremarkable.  Major salivary glands including the parotid and submandibular glands  appear normal and symmetrical. The airway is patent and parapharyngeal  soft tissue  spaces are well-maintained. No adenopathy is appreciated  in the anterior or posterior cervical chains. Both thyroid lobes are  prominent measuring 5.8 cm in length on the right and 5.4 cm on the  left. Additionally, there is uniform enhancement of the thyroid tissue  consistent with the reported history of Graves disease. Correlation  with thyroid function studies would be helpful. No abnormalities are  appreciated in the superior mediastinum. The visualized portions of  the upper lung zones are clear.  ?  IMPRESSION:  1. There is enlargement and uniform contrast-enhancement of both  thyroid lobes consistent with the reported history of Graves disease.  2. No cervical adenopathy or airway compromise is identified.  ?  ?  Signature Line  Electronically Signed By: Elliott Foy MD  Date/Time Signed: 12/21/2017 19:03  ?  ?  This document has an image  ?  Result type:???????  CT Soft Tissue Neck W Contrast  Result date:???????  December 21, 2017 18:52 CST  Result status:???????  Auth (Verified)  Result title:???????  CT Soft Tissue Neck W Contrast  Performed by:???????  Elliott Foy MD on December 21, 2017 19:00 CST  Verified by:???????  Elliott Foy MD on December 21, 2017 19:03 CST  Encounter info:???????  [id number], [facility], Emergency, 12/21/2017 - 12/21/2017  ? [1]     [1]?CT Soft Tissue Neck W Contrast; Elliott Foy MD 12/21/2017 18:52 CST

## 2022-05-05 NOTE — HISTORICAL [facility]
This is a historical note converted from [facility]. Formatting and pictures may have been removed.  Please reference [place] for original formatting and attached multimedia. Chief Complaint  Pt reports right lower pelvic pain after sex, not on BC and unprotected sex with same partner, recent UTI.  Reason for Consultation  Hemorrhagic ovarian cyst  History of Present Illness  25 yo  presents with right-sided abdominal pain started suddenly after intercourse last night, around midnight. Now pain has become more diffuse and reports sharp pains in upper abdomen with movement.  ?  ObHx:  FT  x2, reports Graves disease during pregnancy, possible pyelo; no other complications  ?  GynHx:  Possible h/o herpes; denies h/o chlamydia, gonorrhea, syphilis  H/o abnormal Pap smear, supposed to be getting a LEEP  Review of Systems  *Positive ROS are in bold, otherwise negative  ?   General: weight loss, weight gain, fevers/chills  GI: abdominal pain, diarrhea, constipation  CV: chest pain, palpitations  Resp: SOB, chronic cough, wheezing  Gyn: see HPI  : dysuria, frequency  Neuro: migraine headaches, frequent dizziness  MSK: joint, back pain  Physical Exam  Vitals & Measurements  T:?36.6? ?C (Temporal Artery)? TMIN:?36.6? ?C (Temporal Artery)? TMAX:?37.4? ?C (Oral)? HR:?98(Peripheral)? RR:?17? BP:?109/67? SpO2:?100%? WT:?51?kg? LMP:?11/15/2021 00:00 CST?  Gen: alert, oriented, NAD  CV: regular rate  Resp: unlabored work of breathing  Abdomen: soft, non-distended, diffusely tender to palpation, voluntary guarding  Ext: no edema or erythema  Pelvis:  -Normal appearing external female genitalia without lesion  -Diffusely tender exam, +CMT  -Uterus 6 cm, mobile  Assessment/Plan  Hemorrhagic cyst of right ovary?N83.201  ?25 yo  here with abdominal pain likely due to hemorrhagic ovarian cyst with hemoperitoneum. Patient is hemodynamically stable, vitals wnl, H/H with small decrease. CT AP and TVUS with evidence of  hemoperitoneum, hemorrhagic cyst of right ovary, final reads pending.  ?  Discussion different management options with patient including observation and trend H/H, as well as offered surgical management with diagnostic laparoscopy, evacuation of hemoperitoneum. Discussed risks of surgery, anesthesia, including the possibility of needing to remove ovary to stop bleeding. Patient is unsure at this time what she would like to do.  ?  Will admit for observation, reassess desire for surgery, repeat H/H at 1030 if still undecided.  Keep NPO, LR at 125 ml/hr.  ?  Patient seen with Dr. Medina.  ?  Shyanne Motley MD  [facility], PGY-4  ?  ?   ADDENDUM:  Patient opts for surgical management. Consent for diagnostic laparoscopy with evacuation of hemoperitoneum signed.  ?   Shyanne Motley MD  [facility], PGY-4  Orders:  acetaminophen-HYDROcodone, 1 tab(s), form: Tab, Oral, q6hr PRN for pain, mild, first dose 11/30/21 7:14:00 CST  ketorolac, 30 mg, form: Injection, IV Push, q6hr PRN for pain, Order duration: 5 day(s), first dose 11/30/21 7:14:00 CST, stop date 12/05/21 7:13:00 CST, STAT  Lactated Ringers Injection intravenous solution 1,000 mL, 1,000 mL, 1,000 mL, IV, 125 mL/hr, start date 11/30/21 7:14:00 CST  CBC w/ Auto Diff, Timed collect, 11/30/21 10:30:00 CST, Blood, Once, Stop date 11/30/21 10:30:00 CST, Lab Collect, 11/30/21 10:30:00 CST  Notify Provider, 11/30/21 7:14:00 CST, Please notify MD of patient?s room number and as needed with any problems and/or questions.  NPO, 11/30/21 7:14:00 CST, CM NPO  Place in Outpatient Observation, 11/30/21 7:14:00 CST, [facility] Provost MALHOTRA, Meghna Mar, No  Up ad [place], 11/30/21 7:14:00 CST, Constant Order  Vital Signs, 11/30/21 7:14:00 CST, q2hr  ?  ?  26 F P2 with ruptured hemorrhagic cyst  I have met this patient and agree with the above history and physical.  She understands the procedure as described above with associated risks.  All questions have been answered and  consents have been signed.  Procedure: Dx LSC, evacuation of hematoperitoneum, possible cystectomy vs salpingoophorectomy   Problem List/Past Medical History  Ongoing  Acute disease or injury-related malnutrition  Graves disease  Hyperthyroidism  Tobacco user  Procedure/Surgical History  Transesophageal Echo (for [facility]) (None) (01/17/2018)  Ultrasonography of Heart with Aorta, Transesophageal (01/17/2018)  Tonsillectomy  Tonsils obstructive   Medications  Inpatient  No active inpatient medications  Home  Bentyl 10 mg oral capsule, 10 mg= 1 cap(s), Oral, QID,? ?Not Taking, Completed Rx  Carafate 1 g oral tablet, 1 gm= 1 tab(s), Oral, QID,? ?Not Taking, Completed Rx  Cymbalta 60 mg oral delayed release capsule, 60 mg= 1 cap(s), Oral, Daily  hydrOXYzine pamoate 25 mg oral capsule, 25 mg= 1 cap(s), Oral, TID,? ?Not Taking, Completed Rx  ibuprofen 800 mg oral tablet, 800 mg= 1 tab(s), Oral, q8hr, PRN  indomethacin 50 mg oral capsule, 50 mg= 1 cap(s), Oral, TID,? ?Not Taking, Completed Rx  Macrobid 100 mg oral capsule, 100 mg= 1 cap(s), Oral, BID  methIMAzole 5 mg oral tablet, 5 mg= 1 tab(s), Oral, Daily, 1 refills  Norco 5 mg-325 mg oral tablet, 1 tab(s), Oral, q6hr, PRN  prednisONE 50 mg oral tablet, 50 mg= 1 tab(s), Oral, Daily,? ?Not Taking, Completed Rx  propranolol 10 mg oral tablet, 10 mg= 1 tab(s), Oral, BID, 4 refills  Protonix 40 mg ORAL enteric coated tablet, 40 mg= 1 tab(s), Oral, Daily,? ?Not Taking, Completed Rx  Zofran ODT 4 mg oral tablet, disintegrating, 4 mg= 1 tab(s), Oral, q6hr, PRN  Allergies  Benadryl?(felt funny)  Social History  Abuse/Neglect  No, No, Yes, 11/30/2021  No, No, Yes, 09/16/2021  Alcohol - Denies Alcohol Use, 09/26/2012  Employment/School  Unemployed, 10/15/2020  Exercise  Home/Environment  Lives with Children, Significant other, mother-in-law., 03/22/2021  Nutrition/Health  Regular, Poor, 06/26/2019  Sexual  Sexually active: Yes. Number of current partners 1. Sexual orientation:  Straight or heterosexual. Gender Identity Identifies as female. No, 01/28/2020  Gender Identity Identifies as female., 06/26/2019  Spiritual/Cultural  Gnosticism, 01/28/2020  Gnosticism, 06/26/2019  Substance Use - Denies Substance Abuse, 01/02/2016  Never, 12/26/2017  Tobacco - Denies Tobacco Use, 09/26/2012  10 or more cigarettes (1/2 pack or more)/day in last 30 days, N/A, 11/30/2021  10 or more cigarettes (1/2 pack or more)/day in last 30 days, Cigarettes, Yes, 20 per day., 09/16/2021  Family History  Alcoholism.: Father.  Anxiety.: Mother.  Bipolar: Mother.  Cardiac arrest.: Negative: Father.  Cervical cancer: Mother.  Depression.: Mother.  Drug addiction.: Mother.  Heart failure.: Father.  Ovarian cancer: Grandmother.  Pancreatic cancer: Grandmother.  Lab Results  Test Name Test Result Date/Time Comments   Creatinine 0.60 mg/dL 11/30/2021 01:25 CST    WBC 13.7 x10(3)/mcL (High) 11/30/2021 06:23 CST    WBC 8.3 x10(3)/mcL 11/30/2021 01:25 CST    Hgb 10.9 gm/dL (Low) 11/30/2021 06:23 CST    Hgb 11.6 gm/dL (Low) 11/30/2021 01:25 CST    Hct 33.6 % (Low) 11/30/2021 06:23 CST    Hct 35.9 % 11/30/2021 01:25 CST    Platelet 226 x10(3)/mcL 11/30/2021 06:23 CST    Platelet 217 x10(3)/mcL 11/30/2021 01:25 CST    UA Appear Clear 11/30/2021 01:58 CST Result created by Discern Rule.   UA Color YELLOW 11/30/2021 01:58 CST    UA Spec Grav 1.034 (High) 11/30/2021 01:58 CST    UA Bili Negative 11/30/2021 01:58 CST Result created by Discern Rule.   UA pH 7.0 11/30/2021 01:58 CST    UA Urobilinogen 2 (Abnormal) 11/30/2021 01:58 CST    UA Blood Negative 11/30/2021 01:58 CST Result created by Discern Rule.   UA Glucose Negative 11/30/2021 01:58 CST Result created by Discern Rule.   UA Ketones Trace (Abnormal) 11/30/2021 01:58 CST Result created by Discern Rule.   UA Protein 30 (Abnormal) 11/30/2021 01:58 CST    UA Nitrite Negative 11/30/2021 01:58 CST Result created by Discern Rule.   UA Leuk Est Negative 11/30/2021 01:58 CST Result  created by Discern Rule.   UA WBC Interp 0-2 11/30/2021 01:58 CST Result created by Discern Rule.   UA RBC Interp 11-25 (Abnormal) 11/30/2021 01:58 CST Result created by Discern Rule.   UA Bact Interp None Seen 11/30/2021 01:58 CST    UA Squam Epi Interp  (Abnormal) 11/30/2021 01:58 CST Result created by Discern Rule.   UA Hyal Cast Interp 3-5 (Abnormal) 11/30/2021 01:58 CST Result created by Discern Rule.   Diagnostic Results  TVUS and CT AP final reads pending

## 2022-05-05 NOTE — HISTORICAL [facility] CERNER
This is a historical note converted from [employer]. Formatting and pictures may have been removed.  Please reference [employer]ner for original formatting and attached multimedia. Admit and Discharge Dates  Admit Date: 11/30/2021  Discharge Date: 12/03/2021  Physicians  Attending Physician - Blas MALHOTRA, Krysten NORMAN  Admitting Physician - Blas MALHOTRA, Krysten NORMAN  Consulting Physician - Rui MALHOTRA, Levy STEPHENSON  Consulting Physician - Min MALHOTRA, Samreen HOLLOWAY  Primary Care Physician - Elinor Lisa DO  Discharge Diagnosis  Abdominal pain?[id number]  Anemia?D64.9  Non-cardiac chest pain?R07.89  Post-op pain?G89.18  Surgical Procedures  No procedures recorded for this visit.  Immunizations  No immunizations recorded for this visit.  Admission Information  Presented to [facility] on POD#1 s/p lap evacuation of hematoperitoneum from R hemorrhagic cyst of ovary?with [facility]. Ovarian cyst?hemostatic at time of surgery. Pt c/o left shoulder and midepigastric gas/colicky pain. She also is with SOA and anemia symptoms. She had 1500cc hematoperitoneum per op note. No VB or fevers. Pain only moderately controlled with medication.  Hospital Course  Patient admitted?to observation for blood transfusion given symptomatic anemia and post-op pain control. Received 2 units pRBC given admission H/H 7.4/22.7. Post transfusion H/H to 9.2/28.2. Admission CT without signs of active bleeding and significant decrease in hemoperitoneum post op with small residual hemoperitoneum. Pain level consistent with expected post-op pain. Treated with Percocet 5 and Motrin. Pain thought to be exacerbated by anxiety over?recent ruptured cyst and?hospitalization. Patient continued to express concern over continual bleeding with request of further imaging/work-up. US Pelvis hospital day 2 revealed previously known R?adnexal complex cyst and minimal free fluid. Mild hypotension during admission attributed to decreased oral intake and resolved with 1L LR.  Xanax and home duloxetine restarted.?Cardiology consulted given patients c/o generalized chest pain. EKG without dynamic changes. Troponin negative. Lexiscan revealing no evidence of ischemia with normal perfusion?and EF= 79%. Prior cardiac work-up negative. Cardiology reporting non?cardiac chest pain.?  ?  Discussed extensively with patient that there is no objective evidence to make us concerned there is continual bleeding from ovarian cyst at this time. Reviewed findings from imaging done. Patient hemodynamically stable during admission and upon discharge. Post-transfusion H/H stable. Slight decrease in H/H day of discharge?attributed to dilution from IVF.?No signs/symptoms of acute abdomen. All questions answered and patient expressed understanding of diagnosis, treatment and follow-up. Extensive ED precautions given for signs of new bleeding, ruptured cyst, or possible?post-op complications to include?severely increased abdominal pain, fever, intractable nausea/vomiting. Discharged home on POD3.  Significant Findings  (12/01/2021 02:54 CST CT Abdomen and Pelvis W Contrast)  Impression:  1. There is small pneumoperitoneum, new since the prior examination,  assumed related to the recent surgery with some changes along the  right lower ventral abdominal wall suggesting the location of  laparoscopic access. This is a significant decrease in the  hemoperitoneum with small residual in the subhepatic space and the  pelvis. Again noted is a cystic lesion in the right adnexa, which  measure 3.2 x 3.7 x 4 cm (previously measured 3.7 x 4 x 4.5 cm). The  contrast blush seen on the prior study along the posterior lower  uterus is not currently seen. Low density fluid has largely replaced  the prior pelvic blood. No active bleed currently seen.  2. Details and other findings as discussed above. [1]  ?  (12/01/2021 03:24 CST XR Chest 1 View)  HISTORY: Chest Pain  FINDINGS:  Examination reveals mediastinal and cardiac  silhouettes to be within  normal limits. Lung fields are clear and free of gross infiltrates  atelectases or effusions  ?  IMPRESSION: No active pulmonary disease [2]  ?  (12/02/2021 12:52 CST US Pelvic Non-OB w Transvag if needed)  FINDINGS:  Uterine length 7-8 cm. Endometrial stripe thickness less than 5 mm.  Right adnexal complex cystic lesion 4 cm. Mild ascites present. Left  ovary not visualized  Transvaginal and transvaginal imaging provided  ?  IMPRESSION:  4 cm cyst complex right adnexal cyst and scant ascites [3]  Time Spent on discharge  35 minutes  Objective  Vitals & Measurements  T:?37.2? ?C (Oral)? TMIN:?36.6? ?C (Oral)? TMAX:?37.2? ?C (Oral)? HR:?85(Peripheral)? RR:?18? BP:?111/72? SpO2:?98%?  Physical Exam  Gen: laying in bed, appears comfortable, in no acute distress  CVS: RRR, no murmurs appreciated  Resp: lungs CTA b/l, no wheezing  Abd: 3 laparoscopic incisions?clean, dry?and intact, soft,?tenderness in hypogastric region, some guarding, no rebound, bowel sounds present  Ext: no lower extremity edema, 2+ dorsal pedal pulses, no calf tenderness  Int: no pallor  Patient Discharge Condition  stable  Discharge Disposition  Ms.Jericah Quigley?is being discharged?home.  ?   Activity:?Pelvic rest for 2-4 week. Return to normal activityas tolerated.?  Diet:?Regular Diet  ?  Medications:  -Rx provided for?Percocet 5?for?3 days?  -Continue other medications as previously prescribed  ?   Follow Ups:  -To be seen in [facility] 12/17/21 at 7:15. Patient to be notified if sooner follow up appointment available.  -Follow up with PCP in 3 to 5 days.?  -Follow up with outside Gynecologist and Psychiatrist. Call office to schedule.  ?   The above information was discussed with?the patient?in clear terms.?Shewasable to repeat the instructions to me in?herown words. All questions answered. ED precautions provided.?   Discharge Medication Reconciliation  Prescribed  acetaminophen-oxyCODONE (Percocet 5/325 oral  tablet)?1 tab(s), Oral, q6hr, PRN pain  ferrous sulfate (ferrous sulfate 325 mg (65 mg elemental iron) oral tablet)?325 mg, Oral, Every other day  Continue  DULoxetine (Cymbalta 60 mg oral delayed release capsule)?60 mg, Oral, Daily  acetaminophen-HYDROcodone (Norco 5 mg-325 mg oral tablet)?1 tab(s), Oral, q6hr, PRN as needed for pain  ibuprofen (ibuprofen 800 mg oral tablet)?800 mg, Oral, q8hr, PRN as needed for pain  methIMAzole (methIMAzole 5 mg oral tablet)?5 mg, Oral, Daily  propranolol (propranolol 10 mg oral tablet)?10 mg, Oral, BID  Discontinue  dicyclomine (Bentyl 10 mg oral capsule)?10 mg, Oral, QID  hydrOXYzine (hydrOXYzine pamoate 25 mg oral capsule)?25 mg, Oral, TID  ibuprofen (ibuprofen 800 mg oral tablet)?800 mg, Oral, q8hr  indomethacin (indomethacin 50 mg oral capsule)?50 mg, Oral, TID  ondansetron (Zofran ODT 4 mg oral tablet, disintegrating)?4 mg, Oral, q6hr, PRN nausea/vomiting  pantoprazole (Protonix 40 mg ORAL enteric coated tablet)?40 mg, Oral, Daily  predniSONE (prednisONE 50 mg oral tablet)?50 mg, Oral, Daily  sucralfate (Carafate 1 g oral tablet)?1 gm, Oral, QID  Education and Orders Provided  Ovarian Cyst, Easy-to-Read  Laparoscopy, Adult, Care After (LA) (Custom)  Follow up  Follow-up with PCP in 3 to 5 days  Report to Emergency Department if symptoms return or worsen  Follow up with [facility] 12/17/21 at 7:15am. Call office to schedule earlier appointment if possible.     [1]?CT Abdomen and Pelvis W Contrast; Jose De Jesus MALHOTRA, True PARISI 12/01/2021 02:54 CST  [2]?XR Chest 1 View; Hector Young MD 12/01/2021 03:24 CST  [3]?US Pelvic Non-OB w Transvag if needed; Ceferino MALHOTRA, Yolanda Krause 12/02/2021 12:52 CST

## 2022-05-05 NOTE — HISTORICAL [facility] CERNER
This is a historical note converted from [employer]. Formatting and pictures may have been removed.  Please reference [employer] for original formatting and attached multimedia. Indication for Surgery  25 yo  presented to ED with sudden onset abdominal pain. Found to have right ovarian hemorrhagic cyst and hemoperitoneum on imaging.  Preoperative Diagnosis  1. Hemoperitoneum  2. Right ovarian hemorrhagic cyst  3. Anemia  Postoperative Diagnosis  Same  Operation  Laparoscopic evacuation of hemoperitoneum  Surgeon(s)  Staff: Meghna Medina MD  Resident: Shyanne Motley MD (HO-IV)  Assistant  Suzy Morrison MD (HO-III)  Anesthesia  General endotracheal  Estimated Blood Loss  1500 ml hemoperitoneum  0 ml surgical blood loss  Urine Output  70 ml via Hussein  ?  IV Fluids  250 ml albumin  700 ml LR  Findings  EUA: normal appearing external female genitalia, vaginal epithelium without lesion, cervix visualized without lesion, uterus 6 cm, mobile, not broad, fullness palpable in posterior cul de sac  Intraop: no evidence of bowel or vascular injury upon initial entry, normal liver and stomach edges, ~1500 ml hemoperitoneum; uterus, bilateral fallopian tubes, left ovary normal in appearance; right ovary with 4 cm hemorrhagic cyst, no active bleeding noted  Specimen(s)  None  Complications  None  Technique  Patient was taken to the operating room with IV fluids running. SCDs in place for VTE prophylaxis. General endotracheal anesthesia was obtained without difficulty. Patient placed in dorsal lithotomy position then arms padded and tucked at sides in  position. Exam under anesthesia with findings as above.?Patient was?then prepped and draped in a sterile fashion. Time out performed. Hussein placed to drain the bladder.  ?  A Humi uterine manipulator was placed without difficulty. Attention was then turned to laparoscopy. 1% Lidocaine plain was injected into the?left mid abdominal wall. ?A 5mm incision was made and a 5 mm  Visiport trocar with the 0-degree laparoscope was inserted into the abdomen while the abdominal wall was tented upward.??The obturator was removed and placement ascertained with the laparoscope. ?The abdomen and pelvis were insufflated with CO2 gas to a level of 15 mmHg. ?No visceral or vascular injury occurred with entry into abdomen. ?Survey of the upper abdomen reveled the above noted findings.?Two additional trocars placed under direct visualization at the right mid?abdomen and umbilicus. ?Patient then placed in Trendelenberg to keep the bowel out of the operative field.??Suction  inserted and used to break up and evacuate hemoperitoneum.?After significant amount of hemoperitoneum evacuted, the right ovary was evaluated with findings as noted above. No active bleeding identified. Remainder of hemoperitoneum evacuated. Trocars then?removed. Skin?incisions closed with 3-0 Vicryl and covered with skin glue. All instruments removed from?the vagina.  ?  The patient tolerated the procedure well. ?All instrument and sponge counts were correct times two. ?The patient was taken to the recovery room in a stable condition.  ?  Shyanne Motley MD  [employer], PGY-4  ?   I was present with the resident during all critical and key portions of the procedure and agree with the findings documented in the residents note.  Dr. REYNOLD Medina MD

## 2022-05-05 NOTE — HISTORICAL [facility] CERNER
This is a historical note converted from [employer]. Formatting and pictures may have been removed.  Please reference [employer] for original formatting and attached multimedia. Chief Complaint  follw up -  History of Present Illness  23 year old female here today for F/U. Depression/anxiety pt states she takes Cymbalta at night. She reports she is sleepy all day and when she takes Cymbalta at night she feels more awake. Instructed pt to switch Cymbalta to?am from pm. ?Pt reports nausea and diarrhea for last 36 hours.  Review of Systems  All Systems Negative Except: See HPI  Physical Exam  Vitals & Measurements  T:?37.4? ?C (Oral)? HR:?91(Peripheral)? RR:?16? BP:?99/66?  HT:?154?cm? HT:?154?cm? WT:?55.8?kg? WT:?55.8?kg? BMI:?23.53?  General:? Alert and oriented, No acute distress, General health good  Eye:? Pupils are equal, round and reactive to light, Normal conjunctiva.?  HENT:? Normocephalic, Normal hearing, Oral mucosa is moist, No pharyngeal erythema.?  Neck:? Supple, Non-tender, No carotid bruit, No jugular venous distention, No lymphadenopathy, No thyromegaly.?  Respiratory:? Lungs are clear to auscultation, Respirations are non-labored, Breath sounds are equal, Symmetrical chest wall expansion.?  Cardiovascular:? Normal rate, Regular rhythm, No murmur, Good pulses equal in all extremities, Normal peripheral perfusion, No edema.?  Gastrointestinal:? Soft, Non-tender, Non-distended, Normal bowel sounds, No organomegaly.?  Musculoskeletal: Normal range of motion, Normal strength, No tenderness, No deformity, Normal gait.?  Integumentary:? Warm, Dry.?  Neurologic:? Alert, Oriented.?  Psychiatric:? Cooperative, Appropriate mood & affect.?  ?  ?  ?  Assessment/Plan  Depression  Continue Cymbalta change to am from pm  Read positive daily meditations, avoid negative?media, set healthy boundaries  Exercise daily, keep consistent sleep pattern, eat?a healthy diet ?  Establish good social support, make changes to reduce  stress  Reports any symptoms of suicidal or homicidal ideations immediately, if clinic is closed go to nearest emergency room  Ordered:  Clinic Follow up, *Est. 11/20/18 3:00:00 CST, 4 month F/U, Order for future visit, Depression  Tobacco user, [facility]  ?  Nausea  Use Phenergan as needed  Ordered:  promethazine, 12.5 mg = 1 tab(s), Oral, q6hr, PRN PRN for nausea, # 15 tab(s), 0 Refill(s), Pharmacy: [facility] [place]  ?  Tobacco user  Consider Cessation(not ready) Smokes 1.5?pack? a day  Consider decreasing to half a pack  Discussed benefits : Improved health, decreased cardiac risks, saving money  Re discuss cessation on F/U visit?  Ordered:  Clinic Follow up, *Est. 11/20/18 3:00:00 CST, 4 month F/U, Order for future visit, Depression  Tobacco user, [facility]  ?   Problem List/Past Medical History  Ongoing  Acute disease or injury-related malnutrition  Graves disease  Graves disease  None  Tobacco user  Historical  No qualifying data  Procedure/Surgical History  Transesophageal Echo (for [facility]) (None) (01/17/2018)  Ultrasonography of Heart with Aorta, Transesophageal (01/17/2018)  Tonsils obstructive   Medications  Cymbalta 60 mg oral delayed release capsule, 60 mg, Oral, Daily, 5 refills  FLUTICASONE PROPIONATE 50 MCG/ACT SUSP  GABAPENTIN 300 MG CAPS, 300 mg= 1 cap(s), Oral, BID  PROPRANOLOL HCL 10 MG TABS  Allergies  Benadryl?(felt funny)  Social History  Alcohol - Denies Alcohol Use, 09/26/2012  Never, 12/26/2017  Employment/School  Unemployed, 03/05/2018  Exercise  Home/Environment  Lives with Mother. Living situation: Home/Independent. Alcohol abuse in household: No. Substance abuse in household: No. Smoker in household: No. Injuries/Abuse/Neglect in household: No. Feels unsafe at home: No. Safe place to go: Yes. Family/Friends available for support: Yes. Concern for family members at home: No. Major illness in household: No. Financial concerns: No. TV/Computer concerns: No.,  03/05/2018  Nutrition/Health  Regular, Caffeine intake amount: coffee 1-2 cups/day. Wants to lose weight: No. Sleeping concerns: No. Feels highly stressed: No., 03/05/2018  Sexual  Substance Abuse - Denies Substance Abuse, 01/02/2016  Never, 12/26/2017  Tobacco - Denies Tobacco Use, 09/26/2012  Current every day smoker, Cigarettes, 20 per day., 12/26/2017  Family History  Alcoholism.: Father.  Anxiety.: Mother.  Bipolar: Mother.  Cardiac arrest.: Negative: Father.  Cervical cancer: Mother.  Depression.: Mother.  Drug addiction.: Mother.  Heart failure.: Father.  Ovarian cancer: Grandmother.  Pancreatic cancer: Grandmother.  Immunizations  Vaccine Date Status Comments   influenza virus vaccine, inactivated - Not Given Patient Refuses     varicella virus vaccine 04/03/2006 Recorded    tetanus-diphtheria toxoids 04/03/2006 Recorded    meningococcal conjugate vaccine 04/03/2006 Recorded    measles/mumps/rubella virus vaccine 07/06/1999 Recorded    diphtheria/pertussis, acel/tetanus ped 07/06/1999 Recorded    measles/mumps/rubella virus vaccine 08/08/1995 Recorded    diphtheria/pertussis, acel/tetanus ped 07/05/1995 Recorded    haemophilus b conjugate (HbOC) vaccine 07/05/1995 Recorded    hepatitis B pediatric vaccine 1994 Recorded    haemophilus b conjugate (HbOC) vaccine 1994 Recorded    hepatitis B pediatric vaccine 1994 Recorded    hepatitis B pediatric vaccine 1994 Recorded    Health Maintenance  Health Maintenance  ???Pending?(in the next year)  ??? ??OverDue  ??? ? ? ?Tetanus Vaccine due??04/03/16??and every 10??year(s)  ??? ??Due?  ??? ? ? ?Alcohol Misuse Screening due??07/20/18??and every 1??year(s)  ??? ? ? ?Cervical Cancer Screening due??07/20/18??Variable frequency  ??? ??Due In Future?  ??? ? ? ?Influenza Vaccine not due until??10/02/18??and every 1??year(s)  ???Satisfied?(in the past 1 year)  ??? ??Satisfied?  ??? ? ? ?Blood Pressure Screening on??07/20/18.??Satisfied by Sophia WEAVER,  Latisha  ??? ? ? ?Body Mass Index Check on??07/20/18.??Satisfied by Latisha Cortes LPN  ??? ? ? ?Depression Screening on??07/20/18.??Satisfied by Latisha Cortes LPN  ??? ? ? ?Influenza Vaccine on??01/16/18.??Satisfied by Mary Nevarez MD  ??? ? ? ?Obesity Screening on??07/20/18.??Satisfied by Latisha Cortes LPN  ??? ? ? ?Smoking Cessation on??07/20/18.??Satisfied by Latisha Cortes LPN  ?  ?      Pt reports pelvic pressure last 2-3 days feeling like she needs to urinate more, she reports unprotected sex 2 weeks ago. Her period is due in 2 days.

## 2022-05-11 ENCOUNTER — LAB VISIT (OUTPATIENT)
Dept: LAB | Facility: HOSPITAL | Age: 27
End: 2022-05-11
Attending: INTERNAL MEDICINE
Payer: MEDICAID

## 2022-05-11 DIAGNOSIS — R11.0 NAUSEOUS: ICD-10-CM

## 2022-05-11 DIAGNOSIS — K92.1 TARRY STOOL: Primary | ICD-10-CM

## 2022-05-11 LAB
BASOPHILS # BLD AUTO: 0.05 X10(3)/MCL (ref 0–0.2)
BASOPHILS NFR BLD AUTO: 0.7 %
EOSINOPHIL # BLD AUTO: 0.22 X10(3)/MCL (ref 0–0.9)
EOSINOPHIL NFR BLD AUTO: 3 %
ERYTHROCYTE [DISTWIDTH] IN BLOOD BY AUTOMATED COUNT: 15.5 % (ref 11.5–17)
HCT VFR BLD AUTO: 34 % (ref 37–47)
HGB BLD-MCNC: 10.6 GM/DL (ref 12–16)
IMM GRANULOCYTES # BLD AUTO: 0.03 X10(3)/MCL (ref 0–0.02)
IMM GRANULOCYTES NFR BLD AUTO: 0.4 % (ref 0–0.43)
LYMPHOCYTES # BLD AUTO: 2.18 X10(3)/MCL (ref 0.6–4.6)
LYMPHOCYTES NFR BLD AUTO: 30.1 %
MCH RBC QN AUTO: 27.2 PG (ref 27–31)
MCHC RBC AUTO-ENTMCNC: 31.2 MG/DL (ref 33–36)
MCV RBC AUTO: 87.2 FL (ref 80–94)
MONOCYTES # BLD AUTO: 0.59 X10(3)/MCL (ref 0.1–1.3)
MONOCYTES NFR BLD AUTO: 8.1 %
NEUTROPHILS # BLD AUTO: 4.2 X10(3)/MCL (ref 2.1–9.2)
NEUTROPHILS NFR BLD AUTO: 57.7 %
NRBC BLD AUTO-RTO: 0 %
PLATELET # BLD AUTO: 219 X10(3)/MCL (ref 130–400)
PMV BLD AUTO: 12.7 FL (ref 9.4–12.4)
RBC # BLD AUTO: 3.9 X10(6)/MCL (ref 4.2–5.4)
WBC # SPEC AUTO: 7.3 X10(3)/MCL (ref 4.5–11.5)

## 2022-05-11 PROCEDURE — 85025 COMPLETE CBC W/AUTO DIFF WBC: CPT

## 2022-05-11 PROCEDURE — 36415 COLL VENOUS BLD VENIPUNCTURE: CPT

## 2022-05-12 ENCOUNTER — HOSPITAL ENCOUNTER (EMERGENCY)
Facility: HOSPITAL | Age: 27
Discharge: HOME | End: 2022-05-12
Attending: INTERNAL MEDICINE
Payer: MEDICAID

## 2022-05-12 VITALS
WEIGHT: 111.31 LBS | TEMPERATURE: 98 F | HEART RATE: 84 BPM | BODY MASS INDEX: 21.85 KG/M2 | HEIGHT: 60 IN | OXYGEN SATURATION: 98 % | DIASTOLIC BLOOD PRESSURE: 80 MMHG | RESPIRATION RATE: 20 BRPM | SYSTOLIC BLOOD PRESSURE: 123 MMHG

## 2022-05-12 DIAGNOSIS — R19.7 NAUSEA VOMITING AND DIARRHEA: Primary | ICD-10-CM

## 2022-05-12 DIAGNOSIS — R10.84 GENERALIZED ABDOMINAL PAIN: ICD-10-CM

## 2022-05-12 DIAGNOSIS — R11.2 NAUSEA VOMITING AND DIARRHEA: Primary | ICD-10-CM

## 2022-05-12 LAB
ALBUMIN SERPL-MCNC: 4.3 GM/DL (ref 3.5–5)
ALBUMIN/GLOB SERPL: 1.3 RATIO (ref 1.1–2)
ALP SERPL-CCNC: 51 UNIT/L (ref 40–150)
ALT SERPL-CCNC: 7 UNIT/L (ref 0–55)
APPEARANCE UR: ABNORMAL
AST SERPL-CCNC: 9 UNIT/L (ref 5–34)
B-HCG UR QL: NEGATIVE
BACTERIA #/AREA URNS AUTO: ABNORMAL /HPF
BASOPHILS # BLD AUTO: 0.05 X10(3)/MCL (ref 0–0.2)
BASOPHILS NFR BLD AUTO: 0.7 %
BILIRUB UR QL STRIP.AUTO: NEGATIVE MG/DL
BILIRUBIN DIRECT+TOT PNL SERPL-MCNC: 0.6 MG/DL
BUN SERPL-MCNC: 7.3 MG/DL (ref 7–18.7)
CALCIUM SERPL-MCNC: 9.3 MG/DL (ref 8.4–10.2)
CHLORIDE SERPL-SCNC: 107 MMOL/L (ref 98–107)
CO2 SERPL-SCNC: 25 MMOL/L (ref 22–29)
COLOR UR AUTO: ABNORMAL
CREAT SERPL-MCNC: 0.7 MG/DL (ref 0.55–1.02)
CTP QC/QA: YES
EOSINOPHIL # BLD AUTO: 0.21 X10(3)/MCL (ref 0–0.9)
EOSINOPHIL NFR BLD AUTO: 2.9 %
ERYTHROCYTE [DISTWIDTH] IN BLOOD BY AUTOMATED COUNT: 15.3 % (ref 11.5–17)
GLOBULIN SER-MCNC: 3.2 GM/DL (ref 2.4–3.5)
GLUCOSE SERPL-MCNC: 97 MG/DL (ref 74–100)
GLUCOSE UR QL STRIP.AUTO: NORMAL MG/DL
HCT VFR BLD AUTO: 35.5 % (ref 37–47)
HGB BLD-MCNC: 11.2 GM/DL (ref 12–16)
HYALINE CASTS #/AREA URNS LPF: ABNORMAL /LPF
IMM GRANULOCYTES # BLD AUTO: 0.01 X10(3)/MCL (ref 0–0.02)
IMM GRANULOCYTES NFR BLD AUTO: 0.1 % (ref 0–0.43)
KETONES UR QL STRIP.AUTO: NEGATIVE MG/DL
LEUKOCYTE ESTERASE UR QL STRIP.AUTO: NEGATIVE UNIT/L
LIPASE SERPL-CCNC: 13 U/L
LYMPHOCYTES # BLD AUTO: 2.12 X10(3)/MCL (ref 0.6–4.6)
LYMPHOCYTES NFR BLD AUTO: 28.9 %
MCH RBC QN AUTO: 27.2 PG (ref 27–31)
MCHC RBC AUTO-ENTMCNC: 31.5 MG/DL (ref 33–36)
MCV RBC AUTO: 86.2 FL (ref 80–94)
MONOCYTES # BLD AUTO: 0.54 X10(3)/MCL (ref 0.1–1.3)
MONOCYTES NFR BLD AUTO: 7.4 %
NEUTROPHILS # BLD AUTO: 4.4 X10(3)/MCL (ref 2.1–9.2)
NEUTROPHILS NFR BLD AUTO: 60 %
NITRITE UR QL STRIP.AUTO: NEGATIVE
NRBC BLD AUTO-RTO: 0 %
PH UR STRIP.AUTO: 8 [PH]
PLATELET # BLD AUTO: 247 X10(3)/MCL (ref 130–400)
PMV BLD AUTO: 11.9 FL (ref 9.4–12.4)
POTASSIUM SERPL-SCNC: 3.7 MMOL/L (ref 3.5–5.1)
PROT SERPL-MCNC: 7.5 GM/DL (ref 6.4–8.3)
PROT UR QL STRIP.AUTO: NEGATIVE MG/DL
RBC # BLD AUTO: 4.12 X10(6)/MCL (ref 4.2–5.4)
RBC #/AREA URNS AUTO: ABNORMAL /HPF
RBC UR QL AUTO: NEGATIVE UNIT/L
SODIUM SERPL-SCNC: 136 MMOL/L (ref 136–145)
SP GR UR STRIP.AUTO: 1.01
SQUAMOUS #/AREA URNS LPF: ABNORMAL /HPF
UROBILINOGEN UR STRIP-ACNC: NORMAL MG/DL
WBC # SPEC AUTO: 7.3 X10(3)/MCL (ref 4.5–11.5)
WBC #/AREA URNS AUTO: ABNORMAL /HPF

## 2022-05-12 PROCEDURE — 36415 COLL VENOUS BLD VENIPUNCTURE: CPT | Performed by: PHYSICIAN ASSISTANT

## 2022-05-12 PROCEDURE — 85025 COMPLETE CBC W/AUTO DIFF WBC: CPT | Performed by: PHYSICIAN ASSISTANT

## 2022-05-12 PROCEDURE — 83690 ASSAY OF LIPASE: CPT | Performed by: PHYSICIAN ASSISTANT

## 2022-05-12 PROCEDURE — 81001 URINALYSIS AUTO W/SCOPE: CPT | Performed by: PHYSICIAN ASSISTANT

## 2022-05-12 PROCEDURE — 80053 COMPREHEN METABOLIC PANEL: CPT | Performed by: PHYSICIAN ASSISTANT

## 2022-05-12 PROCEDURE — 25000003 PHARM REV CODE 250: Performed by: PHYSICIAN ASSISTANT

## 2022-05-12 PROCEDURE — 99284 EMERGENCY DEPT VISIT MOD MDM: CPT | Mod: 25

## 2022-05-12 PROCEDURE — 81025 URINE PREGNANCY TEST: CPT | Performed by: PHYSICIAN ASSISTANT

## 2022-05-12 RX ORDER — BACLOFEN 10 MG/1
10 TABLET ORAL 3 TIMES DAILY
COMMUNITY
Start: 2022-03-28 | End: 2022-07-19

## 2022-05-12 RX ORDER — ALPRAZOLAM 1 MG/1
1 TABLET ORAL DAILY PRN
COMMUNITY
Start: 2022-05-09 | End: 2023-11-28 | Stop reason: SDUPTHER

## 2022-05-12 RX ORDER — PANTOPRAZOLE SODIUM 40 MG/1
40 TABLET, DELAYED RELEASE ORAL DAILY
Qty: 30 TABLET | Refills: 11 | Status: SHIPPED | OUTPATIENT
Start: 2022-05-12 | End: 2023-01-19

## 2022-05-12 RX ORDER — ONDANSETRON 4 MG/1
4 TABLET, ORALLY DISINTEGRATING ORAL EVERY 6 HOURS PRN
COMMUNITY
Start: 2021-11-30 | End: 2023-11-28

## 2022-05-12 RX ORDER — DICYCLOMINE HYDROCHLORIDE 10 MG/1
10 CAPSULE ORAL 3 TIMES DAILY
COMMUNITY
Start: 2022-01-04 | End: 2022-10-06 | Stop reason: CLARIF

## 2022-05-12 RX ORDER — ONDANSETRON 4 MG/1
4 TABLET, ORALLY DISINTEGRATING ORAL
Status: COMPLETED | OUTPATIENT
Start: 2022-05-12 | End: 2022-05-12

## 2022-05-12 RX ORDER — CHLORHEXIDINE GLUCONATE ORAL RINSE 1.2 MG/ML
5 SOLUTION DENTAL 3 TIMES DAILY
COMMUNITY
Start: 2022-04-05 | End: 2023-11-28

## 2022-05-12 RX ORDER — MONTELUKAST SODIUM 10 MG/1
10 TABLET ORAL NIGHTLY
COMMUNITY
Start: 2022-04-26 | End: 2022-10-06 | Stop reason: CLARIF

## 2022-05-12 RX ORDER — LIDOCAINE HYDROCHLORIDE 20 MG/ML
10 SOLUTION OROPHARYNGEAL ONCE
Status: COMPLETED | OUTPATIENT
Start: 2022-05-12 | End: 2022-05-12

## 2022-05-12 RX ORDER — SUCRALFATE 1 G/1
1 TABLET ORAL
Qty: 40 TABLET | Refills: 0 | Status: SHIPPED | OUTPATIENT
Start: 2022-05-12 | End: 2022-05-22

## 2022-05-12 RX ORDER — METHIMAZOLE 5 MG/1
5 TABLET ORAL
COMMUNITY
Start: 2021-12-20 | End: 2022-07-06 | Stop reason: SDUPTHER

## 2022-05-12 RX ORDER — MAG HYDROX/ALUMINUM HYD/SIMETH 200-200-20
30 SUSPENSION, ORAL (FINAL DOSE FORM) ORAL ONCE
Status: COMPLETED | OUTPATIENT
Start: 2022-05-12 | End: 2022-05-12

## 2022-05-12 RX ORDER — PANTOPRAZOLE SODIUM 40 MG/1
40 TABLET, DELAYED RELEASE ORAL DAILY
COMMUNITY
Start: 2021-12-20 | End: 2022-10-06 | Stop reason: CLARIF

## 2022-05-12 RX ADMIN — ONDANSETRON 4 MG: 4 TABLET, ORALLY DISINTEGRATING ORAL at 01:05

## 2022-05-12 RX ADMIN — LIDOCAINE HYDROCHLORIDE 10 ML: 20 SOLUTION ORAL at 01:05

## 2022-05-12 RX ADMIN — ALUMINUM HYDROXIDE, MAGNESIUM HYDROXIDE, AND SIMETHICONE 30 ML: 200; 200; 20 SUSPENSION ORAL at 01:05

## 2022-05-12 NOTE — ED PROVIDER NOTES
"Name: Royce Quigley   Age: 27 y.o.  Sex: female    Chief complaint:   Chief Complaint   Patient presents with    Abdominal Pain     "Chronic stomach problems for a year". States different feeling stomach pain x2 days with black stools with n/v. Denies blood/black emesis.       Patient arrived with: Private  History obtained from: Patient    Subjective:   Patient with pmhx of chronic abdominal pain presents today c/o generalized abdominal pain that is different from her chronic abdominal pain. She says this pain started 2 days ago. She also reports nausea, 1 episode of vomiting, and 1-2 episodes of diarrhea. Patient says she has been taking peptobismol which helps a little.  She is followed by a gastroenterologist and says she is supposed to be having an EGD done soon. She would like a refill of pantoprazole and carafate.     Past Medical History:   Diagnosis Date    Graves disease      History reviewed. No pertinent surgical history.  Social History     Socioeconomic History    Marital status: Single   Tobacco Use    Smoking status: Current Every Day Smoker     Packs/day: 2.00    Smokeless tobacco: Never Used   Substance and Sexual Activity    Alcohol use: Never    Drug use: Never     Review of patient's allergies indicates:   Allergen Reactions    Diphenhydramine Anxiety and Other (See Comments)        Review of Systems   Constitutional: Negative for chills and fever.   HENT: Negative for sore throat.    Respiratory: Negative for shortness of breath.    Cardiovascular: Negative for chest pain.   Gastrointestinal: Positive for abdominal pain, diarrhea, nausea and vomiting. Negative for constipation.   Genitourinary: Negative for dysuria, flank pain and hematuria.   Musculoskeletal: Negative for myalgias.   Skin: Negative for rash.   Neurological: Negative for headaches.          Objective:     Initial Vitals [05/12/22 1122]   BP Pulse Resp Temp SpO2   123/80 84 20 98.4 °F (36.9 °C) 98 %      MAP       --  "           Physical Exam  Constitutional:       General: She is not in acute distress.     Appearance: She is well-developed and normal weight. She is not ill-appearing, toxic-appearing or diaphoretic.   HENT:      Head: Normocephalic and atraumatic.      Mouth/Throat:      Mouth: Mucous membranes are moist.   Eyes:      General: No scleral icterus.     Extraocular Movements: Extraocular movements intact.   Cardiovascular:      Rate and Rhythm: Normal rate and regular rhythm.      Heart sounds: Normal heart sounds.   Pulmonary:      Effort: Pulmonary effort is normal.      Breath sounds: Normal breath sounds.   Abdominal:      General: Abdomen is flat. Bowel sounds are normal.      Palpations: Abdomen is soft.      Tenderness: There is generalized abdominal tenderness. There is no right CVA tenderness, left CVA tenderness, guarding or rebound.   Skin:     General: Skin is warm and dry.      Capillary Refill: Capillary refill takes less than 2 seconds.      Coloration: Skin is not jaundiced.   Neurological:      General: No focal deficit present.      Mental Status: She is alert and oriented to person, place, and time.   Psychiatric:         Mood and Affect: Mood normal.          Records:  Nursing records and triage records reviewed  Prior records reviewed    Labs:  Recent Results (from the past 24 hour(s))   POCT urine pregnancy    Collection Time: 05/12/22 12:13 PM   Result Value Ref Range    POC Preg Test, Ur Negative Negative     Acceptable Yes    Urinalysis, Reflex to Urine Culture Urine, Clean Catch    Collection Time: 05/12/22 12:13 PM    Specimen: Urine   Result Value Ref Range    Color, UA Light-Yellow (A) Yellow, Colorless, Other, Clear    Appearance, UA Turbid (A) Clear    Specific Gravity, UA 1.010     pH, UA 8.0 5.0, 5.5, 6.0, 6.5, 7.0, 7.5, 8.0, 8.5    Protein, UA Negative Negative, 300  mg/dL    Glucose, UA Normal Negative, Normal mg/dL    Ketones, UA Negative Negative, +1, +2, +3, +4,  +5, >=160 mg/dL    Blood, UA Negative Negative unit/L    Bilirubin, UA Negative Negative mg/dL    Urobilinogen, UA Normal 0.2, 1.0, Normal mg/dL    Nitrites, UA Negative Negative    Leukocyte Esterase, UA Negative Negative, 75  unit/L    WBC, UA 0-5 None Seen, 0-2, 3-5, 0-5 /HPF    Bacteria, UA None Seen None Seen /HPF    Squamous Epithelial Cells, UA Moderate /HPF    Hyaline Casts, UA None Seen None Seen /lpf    RBC, UA 0-5 None Seen, 0-2, 3-5, 0-5 /HPF   Comp. Metabolic Panel    Collection Time: 05/12/22 12:20 PM   Result Value Ref Range    Sodium Level 136 136 - 145 mmol/L    Potassium Level 3.7 3.5 - 5.1 mmol/L    Chloride 107 98 - 107 mmol/L    Carbon Dioxide 25 22 - 29 mmol/L    Glucose Level 97 74 - 100 mg/dL    Blood Urea Nitrogen 7.3 7.0 - 18.7 mg/dL    Creatinine 0.70 0.55 - 1.02 mg/dL    Calcium Level Total 9.3 8.4 - 10.2 mg/dL    Protein Total 7.5 6.4 - 8.3 gm/dL    Albumin Level 4.3 3.5 - 5.0 gm/dL    Globulin 3.2 2.4 - 3.5 gm/dL    Albumin/Globulin Ratio 1.3 1.1 - 2.0 ratio    Bilirubin Total 0.6 <=1.5 mg/dL    Alkaline Phosphatase 51 40 - 150 unit/L    Alanine Aminotransferase 7 0 - 55 unit/L    Aspartate Aminotransferase 9 5 - 34 unit/L    Estimated GFR-Non  >60 mls/min/1.73/m2   Lipase    Collection Time: 05/12/22 12:20 PM   Result Value Ref Range    Lipase Level 13 <=60 U/L   CBC with Differential    Collection Time: 05/12/22 12:20 PM   Result Value Ref Range    WBC 7.3 4.5 - 11.5 x10(3)/mcL    RBC 4.12 (L) 4.20 - 5.40 x10(6)/mcL    Hgb 11.2 (L) 12.0 - 16.0 gm/dL    Hct 35.5 (L) 37.0 - 47.0 %    MCV 86.2 80.0 - 94.0 fL    MCH 27.2 27.0 - 31.0 pg    MCHC 31.5 (L) 33.0 - 36.0 mg/dL    RDW 15.3 11.5 - 17.0 %    Platelet 247 130 - 400 x10(3)/mcL    MPV 11.9 9.4 - 12.4 fL    Neut % 60.0 %    Lymph % 28.9 %    Mono Auto 7.4 %    Eos Auto 2.9 %    Basophil Auto 0.7 %    Lymph # 2.12 0.6 - 4.6 x10(3)/mcL    Neut # 4.4 2.1 - 9.2 x10(3)/mcL    Abs Mono 0.54 0.1 - 1.3 x10(3)/mcL    Abs Eos  0.21 0 - 0.9 x10(3)/mcL    Abs Baso 0.05 0 - 0.2 x10(3)/mcL    IG# 0.01 0 - 0.0155 x10(3)/mcL    IG% 0.1 0 - 0.43 %    NRBC% 0.0 %        Images:  No results found.   Imaging Results          X-Ray Abdomen Flat And Erect (Final result)  Result time 05/12/22 13:56:13    Final result by Chano Herrera MD (05/12/22 13:56:13)                 Impression:      Nonobstructive and nonspecific bowel gas pattern. No radiographic evidence of pneumoperitoneum.      Electronically signed by: Chano Herrera  Date:    05/12/2022  Time:    13:56             Narrative:    EXAMINATION:  XR ABDOMEN FLAT AND ERECT    CLINICAL HISTORY:  Unspecified abdominal pain XR ABDOMEN FLAT AND ERECT    TECHNIQUE:  Supine and erect AP views of the abdomen    COMPARISON:  None available.    FINDINGS:  The bowel gas pattern is nonobstructive and nonspecific. No dilated loops of small bowel or air-fluid levels are identified. No radiographic evidence of pneumoperitoneum. No suspicious calcifications or soft tissue density masses. The imaged lung bases are clear. The osseous structures are intact.                                   Medications:  Medications   aluminum-magnesium hydroxide-simethicone 200-200-20 mg/5 mL suspension 30 mL (30 mLs Oral Given 5/12/22 1315)     And   LIDOcaine HCl 2% oral solution 10 mL (10 mLs Oral Given 5/12/22 1315)   ondansetron disintegrating tablet 4 mg (4 mg Oral Given 5/12/22 1315)            Medical decision making:               Procedures       Diagnosis:  Final diagnoses:  [R11.2, R19.7] Nausea vomiting and diarrhea (Primary)  [R10.84] Generalized abdominal pain     Royce Quigley discharge to home/self care.    - Condition at discharge: Stable  - Mode of Discharge: by walking out   - The discharge instructions were discussed with the patient/parent.  - They state an understanding of the discharge instructions.      ED Prescriptions     Medication Sig Dispense Start Date End Date Auth. Provider    pantoprazole  (PROTONIX) 40 MG tablet Take 1 tablet (40 mg total) by mouth once daily. 30 tablet 5/12/2022 5/12/2023 KAYA Espinoza    sucralfate (CARAFATE) 1 gram tablet Take 1 tablet (1 g total) by mouth 4 (four) times daily with meals and nightly. for 10 days 40 tablet 5/12/2022 5/22/2022 KAYA Espinoza          Follow-up Information     Follow up With Specialties Details Why Contact Info    Primary Care Provider within 1-2 days  Go in 1 day      [facility] Emergency Dept Emergency Medicine  If symptoms worsen return to ED immediately [address] [facility] [address] [phone]            (Please note that this chart was completed via voice to text dictation. There may be typographical errors or substitutions that are unintentional, or uncorrected. Every attempt was made to proofread the chart prior to completion. If there are any questions, please contact the provider for final clarification).       KAYA Espinoza  05/12/22 9800

## 2022-05-13 ENCOUNTER — PATIENT OUTREACH (OUTPATIENT)
Dept: EMERGENCY MEDICINE | Facility: HOSPITAL | Age: 27
End: 2022-05-13
Payer: MEDICAID

## 2022-05-24 ENCOUNTER — PATIENT OUTREACH (OUTPATIENT)
Dept: EMERGENCY MEDICINE | Facility: HOSPITAL | Age: 27
End: 2022-05-24
Payer: MEDICAID

## 2022-05-24 NOTE — PROGRESS NOTES
Spoke to pt for f/u call, pt had ED visit on 5/12/22 at [facility], advised pt to follow discharge instruction, discussed medication and budget healthy eating. Pt reports she had EGD procedure done this am with general surgery Dr. Diane Montoya in [place], [place]. Discussed upcoming appts, stressed importance of f/u care with all providers and ancillary care. Advised of ED utilization. Pt denies si/hi and mental health crisis, reports she has weekly telmed visits with mental health counselor, lv- 5/20/22, nv- 5/27/22. Encouraged pt to enroll in patient portal. Pt request to mail appt letter and information to download appt for patient portal to her, verified pt address with pt. Pt voices understanding to instructions given and appreciation.    PCP Visit Within Year :   yes  PCP Visit Upcoming Reason :     PCP Visit One Year Date :    5/6/22  Follow-Up Specialist Appt Scheduled : has not had opportunity to obtain f/u appt, pt reports will call for appt.    Type of Specialist :   RAFITA CROWDER NP (MENTAL HEALTH)  LV-3/23/22, NV- 3 MONTHS  [facility] LV- 5/2022, NV- pt reports after 6/3/22  MENTAL HEALTH COUNSELOR LV-5/20/22, NV-5/27/22  DIANE MONTOYA MD (GENERAL SURGERY) LV- 5/24/22, NV- pt in a couple of weeks  [facility] GYN- NV 06/15/22 AT 3PM  [facility] ENDOCRINE CLINIC- NV 7/19/22 AT 8AM      Providers Patient Visited Last Year :   [facility]  MELVI DUPREE-JAMES (PCP)  ANIYA CROWDER. PA (MENTAL HEALTH PROVIDER)  [employer] MENTAL HEALTH COUNSELOR    [facility] ENDOCRINE CLINIC   DR. EPHRAIM VANCE (GI)  Quyen Rodriguez, MSN, APRN, FNP-BC    DIANE MONTOYA MD (GENERAL SURGERY)

## 2022-06-13 ENCOUNTER — PATIENT OUTREACH (OUTPATIENT)
Dept: EMERGENCY MEDICINE | Facility: HOSPITAL | Age: 27
End: 2022-06-13
Payer: MEDICAID

## 2022-06-13 NOTE — PROGRESS NOTES
Spoke to pt and call was for reminder for gyn appt 6/15/22, noted appt r/s to 7/27/22 at 3pm, notified pt. Stressed importance of f/u care with pcp and all providers and ancillary care.  Pt voices understanding to instructions given and appreciation.

## 2022-07-06 DIAGNOSIS — E05.00 GRAVES DISEASE: Primary | ICD-10-CM

## 2022-07-06 RX ORDER — METHIMAZOLE 5 MG/1
5 TABLET ORAL DAILY
Qty: 30 TABLET | Refills: 0 | Status: SHIPPED | OUTPATIENT
Start: 2022-07-06 | End: 2022-07-19 | Stop reason: SDUPTHER

## 2022-07-06 NOTE — TELEPHONE ENCOUNTER
----- Message from Anahi Motley sent at 7/6/2022 12:50 PM CDT -----  Regarding: pT CALLED  #ARACELI PT#    PT called for Rx of methemazole      [facility]      Pt call back [phone]  Pt aware of upcoming appt

## 2022-07-18 ENCOUNTER — PATIENT OUTREACH (OUTPATIENT)
Dept: EMERGENCY MEDICINE | Facility: HOSPITAL | Age: 27
End: 2022-07-18
Payer: MEDICAID

## 2022-07-18 ENCOUNTER — PATIENT OUTREACH (OUTPATIENT)
Dept: EMERGENCY MEDICINE | Facility: HOSPITAL | Age: 27
End: 2022-07-18

## 2022-07-18 NOTE — PROGRESS NOTES
orignal encounter date 21 in [employer] emr info. placed in epic for continuity purposes.    HealthE Care Entered On:  2021 14:27 CST    Performed On:  2021 13:54 CST by Daysi Motley LPN               Discharge Past 30 Days   Visit to the Hospital in the Last 30 Days :   Emergency department (ED) visit   Reason for Choosing ED for Care :   Believes the problem too serious for doctor office or clinic   Perception of Change in Health Status DC :   Improving   Discharged To :   Home independently   DC Instructions :   Received discharge instructions , Understands discharge instructions    Education Provided :   Chronic disease process, ED utilization, Importance of keeping appointments, Community resources, Medication Compliance, Diet Compliance, Other: benefits of pcp, mental health providers and all providers and ancillary care.   (Comment: budget healthy friendly eating [Daysi Motley LPN - 2021 13:54 CST] )   Discharge Past 30 Days Addntl Comments :   spoke to pt for f/u ED visit. pt reports she is on her way to her grandmother's , offered pt to call her on another day. pt reports she can talk for a few minutes. pt reports she is feeling better. discussed upcoming appts and stressed importance of f/u care with pcp, mental health provider and all providers and ancillary care. pt voices understanding to instructions given and appreciation.     Daysi Motley LPN - 2021 13:54 CST   Barriers to Care   SDoH Eat Less Than You Should :   No   SDoH Shut Off Services to Your Home :   No   SDoH No Regular Place to Live :   No   SDoH Needed Provider but Costs too Much :   No   SDoH Help Reading and Writing Paper Work :   No   SDoH Feel Lonely Often :   Yes   (Comment: ocassional loneliness, pt reports she has good family support and her mental health counselor that she calls on whenever needed  [Daysi Motley LPN - 2021 13:54 CST] )   SDoH  Missed Appt/Meds- No Transportation :   No   SDoH Call Dr To Be Seen Right Away :   No   SDoH Medical Problems Cause ED Visit :   Yes   SDoH Other Problems Affecting Health :   Yes   SDoH Other Problems Details :   Mental health concerns   (Comment: pt reports her mental health provider is KAYA Del Rio, last visit 12/7/21 and next visit 1/18/22  and mental health counselor weekly home visits with [employer],  pt caitlin si/hi and mental health crisis. encouraged pt to visit ED for si/hi and mental health crisis [Daysi Motley LPN - 12/22/2021 13:54 CST] )   SDoH Reviewed :   Yes   SDoH Dentist Seen in Last Year :   No   Daysi Motley LPN - 12/22/2021 13:54 CST   Prescriptions   Medication Reconcilation Completed :   Unknown   Medication Prescriptions Filled After DC :   Confirms all medications filled , Confirms taking medications as prescribed    Questions About Meds Prescribed at DC :   Denies any questions or concerns                    Daysi Motley LPN - 12/22/2021 13:54 CST   Appointments   Follow-Up Appt Scheduled :   No   Follow-Up Appointment Status :   Has not had opportunity to call for appointment   (Comment: advised pt to call and obtain f/u appt with pcp, pt reports she will call to obtain f/u appt with pcp [Daysi Motley LPN - 12/22/2021 13:54 CST] )   PCP Visit Within Year :   Yes   PCP Visit One Year Date :   12/14/2021 CST   Follow-Up Specialist Appt Scheduled :   Yes   Type of Specialist :   12/28/21 dr. murry gyn   1/18/22 mental health provider KAYA Laws  06/15/22 15:00Telemedicine [facility]    Daysi Motley LPN - 12/22/2021 13:54 CST   Navigation   Initial Assesment Completed By :   Phone   ED FIN :   [id number]   MCIP Navigation Call Log :   First follow up call   Referral To  Care :   MCIP Navigation   Transportation Arrangements Made :   Yes   Providers Patient Visited Last Year :    [facility]  SHARRON DUPREE  (PCP)  ANIYA KIRKPATRICK (MENTAL HEALTH PROVIDER)  [employer] MENTAL HEALTH COUNSELOR    Root Causes for High-ED Utilization :   Chronic conditions, Mental Health Care   Plan: :   Educated on appropriate ED utilization, Educated on alternate means of health care; ie urgent care when PCP not available, Educated on importance of follow up care with PCP, Educated on importance of follow up preventative dental care with dentist, Budget-friendly health eating education given   Participation in Activity Designed to Address Lack of Annual Ambulatory or Preventative Care Visit? :   Yes   Participation in Activity Designed to Address Avoidable ED Utilization? :   Yes   During Current Measurement Year, Did Enrollee Receive Education Regarding Outpatient Primary Care Options? :   Yes   During Current Measurement Year, Did Enrollee Receive an Appt Reminder 24-48 Hours Before a Scheduled Appt? :   Yes   During Current Measurement Year, Did the Network Provider Schedule and Appt or Provide a Referral to Enrollee? :   Yes   Education Provided :   Verbal   Daysi Motley LPN - 12/22/2021 13:54 CST

## 2022-07-18 NOTE — PROGRESS NOTES
original encounter date 3/23/22 in [facility] EMR info placed in Epic for continuity purposes.     HealthE Care Entered On:  3/23/2022 15:56 CDT    Performed On:  3/23/2022 15:39 CDT by Daysi Motley LPN        Discharge Past 30 Days   Visit to the Hospital in the Last 30 Days :   None   Perception of Change in Health Status DC :   Improving   Education Provided :   Chronic disease process, ED utilization, Importance of keeping appointments, Community resources, Medication Compliance, Diet Compliance, Other: benefits of pcp and all providers and ancillary care   (Comment: budget friendly healthy eating [Daysi Motley LPN - 3/23/2022 15:39 CDT] )   Discharge Past 30 Days Addntl Comments :   spoke to pt for f/u call, doing well, reports she has seen GI Dr. Fuentes on 3/22/22 and Cardiology [facility] on 3/22/22. discussed upcoming appts and stressed importance of f/u care with all providers and ancillary care and attending all scheduled appts. pt voices understanding to instructions given and appreciation.     Patient pregnant :   No   Daysi Motley LPN - 3/23/2022 15:39 CDT   Barriers to Care   SDoH Eat Less Than You Should :   No   SDoH Shut Off Services to Your Home :   No   SDoH No Regular Place to Live :   No   SDoH Needed Provider but Costs too Much :   No   SDoH Help Reading and Writing Paper Work :   No   SDoH Feel Lonely Often :   No   SDoH Missed Appt/Meds- No Transportation :   No   SDoH Call Dr To Be Seen Right Away :   No   SDoH Medical Problems Cause ED Visit :   Yes   SDoH Other Problems Affecting Health :   Yes   SDoH Other Problems Details :   Mental health concerns   (Comment: pt reports mental health managed by Sol Limon denies si/hi and mental health crisis and seen mental health provider today [Daysi Motley LPN - 3/23/2022 15:39 CDT] )   SDoH Reviewed :   Yes   SDoH Dentist Seen in Last Year :   No   Daysi Motley LPN - 3/23/2022 15:39 CDT    Appointments   Follow-Up Appt Scheduled :   No   Follow-Up Appointment Status :   Has not had opportunity to call for appointment   PCP Visit Within Year :   Yes   PCP Visit Upcoming Reason :   Regular visit   PCP Visit One Year Date :   3/10/2022 CST   Follow-Up Specialist Appt Scheduled :   Yes   Type of Specialist :   MAREN phillips-3/22/22   Cardiology [facility] -3/22/22    [facility]  07/19/22 08:00Est- [facility] Endocrine   Follow-Up Radiology Appt Scheduled :   Yes   Radiology Orders :   03/23/22  done today [facility]  Thyroid u/s   Tiesha BRYANN, Daysi Gamino - 3/23/2022 15:39 CDT   Navigation   Initial Assesment Completed By :   Phone   ED FIN :   [id number]   MCIP Navigation Call Log :   Third follow up call   Transportation Arrangements Made :   Yes   Providers Patient Visited Last Year :   [facility]  VERNON DUPREEP-C (PCP)  ANIYA KIRKPATRICK (MENTAL HEALTH PROVIDER)  RESOURCE MANAGEMENT MENTAL HEALTH COUNSELOR    [facility] ENDO  DR. EPHRAIM VANCE (GI)  Quyen Rodriguez, MSN, APRN, FNP-BC    Root Causes for High-ED Utilization :   Chronic conditions, Mental Health Care   Plan: :   Educated on appropriate ED utilization, Educated on alternate means of health care; ie urgent care when PCP not available, Educated on importance of follow up care with PCP, Referred to community resources; ie [place], Educated on importance of follow up preventative dental care with dentist, Budget-friendly health eating education given, Other: pt reports she did not get mail on [facility] ed/res and request to mail again, verified pt addres with pt.   Participation in Activity Designed to Address Lack of Annual Ambulatory or Preventative Care Visit? :   Yes   Participation in Activity Designed to Address Avoidable ED Utilization? :   Yes   During Current Measurement Year, Did Enrollee Receive Education Regarding Outpatient Primary Care Options? :   Yes   During Current Measurement Year, Did Enrollee  Receive an Appt Reminder 24-48 Hours Before a Scheduled Appt? :   Yes   During Current Measurement Year, Did the Network Provider Schedule and Appt or Provide a Referral to Enrollee? :   Yes   Education Provided :   Verbal, Written   Daysi Motley LPN - 3/23/2022 15:39 CDT

## 2022-07-18 NOTE — PROGRESS NOTES
orignial encounter date 2/23/22 in [employer] emr info. placed in Epic for continuity purposes.     HealthE Care Entered On:  2/23/2022 15:45 CST    Performed On:  2/23/2022 15:25 CST by Daysi Motley LPN         Discharge Past 30 Days   Visit to the Hospital in the Last 30 Days :   None   Perception of Change in Health Status DC :   Improving   Education Provided :   Chronic disease process, ED utilization, Importance of keeping appointments, Community resources, Medication Compliance, Diet Compliance   (Comment: budget friendly eating [Daysi Motley LPN - 2/23/2022 15:25 CST] )   Discharge Past 30 Days Addntl Comments :   spoke to pt for f/u call, discussed upcoming appts and missed appts, stressed importance of f/u care with all providers and ancillary care and attending all appts. pt request to mail appt letter and sdoh ed/resouce discuss and that she does not remember getting from last discussion. pt wish for continue f/u calls and voices understanding to instructions given and appreciation.     Patient pregnant :   No   Daysi Motley LPN - 2/23/2022 15:25 CST   Barriers to Care   SDoH Eat Less Than You Should :   No   SDoH Shut Off Services to Your Home :   No   SDoH No Regular Place to Live :   No   SDoH Needed Provider but Costs too Much :   No   SDoH Help Reading and Writing Paper Work :   No   SDoH Feel Lonely Often :   No   SDoH Missed Appt/Meds- No Transportation :   No   SDoH Call Dr To Be Seen Right Away :   No   SDoH Medical Problems Cause ED Visit :   Yes   SDoH Other Problems Affecting Health :   Yes   SDoH Other Problems Details :   Mental health concerns   (Comment: pt reports mental health managed by KAYA Limon. denies si/hi and mental health crisis. encourage pt to visit ED for si/hi and mental health crisis. pt reports she is doing well and taking medications [Daysi Motley LPN - 2/23/2022 15:25 CST] )   SDoH Reviewed :   Yes   SDoH Dentist Seen in  Last Year :   No   Daysi Motley LPN - 2/23/2022 15:25 CST   Appointments   Follow-Up Appt Scheduled :   No   Follow-Up Appointment Status :   Has not had opportunity to call for appointment   PCP Visit Within Year :   Yes   Daysi Motley LPN - 2/23/2022 15:25 CST   Navigation   ED FIN :   [id number]   MCIP Navigation Call Log :   Second follow up call   Providers Patient Visited Last Year :   [facility]  SHARRON DUPREE (PCP)  ANIYA KIRKPATRICK (MENTAL HEALTH PROVIDER)  RES[employer] MANAGEMENT MENTAL HEALTH COUNSELOR    [facility] ADEBAYO VANCE (GI)  Quyen Rodriguez, MSN, APRN, FNP-BC   Root Causes for High-ED Utilization :   Chronic conditions, Mental Health Care   Plan: :   Educated on appropriate ED utilization, Educated on alternate means of health care; ie urgent care when PCP not available, Educated on importance of follow up care with PCP, Referred to community resources; ie [place], Educated on importance of follow up preventative dental care with dentist, Budget-friendly health eating education given, Other: MAIL APPT LETTER AND [facility] ED/RES PER PT REQUEST   Participation in Activity Designed to Address Lack of Annual Ambulatory or Preventative Care Visit? :   Yes   Participation in Activity Designed to Address Avoidable ED Utilization? :   Yes   During Current Measurement Year, Did Enrollee Receive Education Regarding Outpatient Primary Care Options? :   Yes   During Current Measurement Year, Did the Network Provider Schedule and Appt or Provide a Referral to Enrollee? :   Yes   Education Provided :   Verbal, Written   Daysi Motley LPN - 2/23/2022 15:25 CST

## 2022-07-18 NOTE — PROGRESS NOTES
orig. encounter date 12/15/21 in [employer] emr info. placed in epic for continuity purposes    HealthE Care Entered On:  12/15/2021 14:35 CST    Performed On:  12/15/2021 13:43 CST by Daysi Motley LPN         Discharge Past 30 Days   Visit to the Hospital in the Last 30 Days :   Emergency department (ED) visit   Reason for Choosing ED for Care :   Believes the problem too serious for doctor office or clinic   Perception of Change in Health Status DC :   Improving   Discharged To :   Home independently   DC Instructions :   Received discharge instructions , Understands discharge instructions    Education Provided :   Chronic disease process, ED utilization, Importance of keeping appointments, Community resources, Medication Compliance, Diet Compliance, Other: benefits of pcp, mental health provider, all providers and ancillary care. education also given  sdoh on oral care/dental provider options, socialization    (Comment: budget health eating [Daysi Motley LPN - 12/15/2021 13:43 CST] )   Discharge Past 30 Days Addntl Comments :   spoke to pt for f/u ed visit. pt reports she is feeling better, no rx given. stressed importance to pt to obtain f/u appt with pcp. pt reports her current pcp is SHARRON Espinosa in [place], last visit 12/14/21 and wants to change to a PCP in [place], closer to where she lives. offered pt to assist her with obtaining pcp appt with [facility] or pcp of choice,  pt reports she will do on her own, contact # given to pt for [facility], pt also reports she had x 2 appt with another pcp to est care that she missed both times and that office told her that she can no longer get appt with there office. pt also reports she will call her insurance to get additional pcp options. pt reports her mental health provider is KAYA Del Rio and last seen on 12/7/21 tele med visit and f/u is in 6 weeks, she does not remember the date of appt, but has it written down, pt also reports that  she has a mental health counselor with [employer] that she has weekly visits her counselor name is Uyen. pt denies si/hi and mental health crisis, pt does report occasional anxiety and loneliness but she has good family support and she also calls her counselor whenever she needs. encouraged pt to visit ed for mental health crisis and emergency issues and advised to utilize [facility] for non-emergency issues when pcp is not available. pt reports she had gyn surgery on 11/30/21 and f/u appt is on friday 12/17/21 at 7:15am with [facility], pt denies any problems. stressed importance of follow up care with all providers and ancillary care. pt request to mail sdoh education/resource discussed to her, verified pt address with pt. advise pt to call for non-urgent issues. pt wish for continue f/u calls and voices understanding to instructions given and appreciation.     Daysi Motley LPN - 12/15/2021 13:43 CST   Barriers to Care   SDoH Eat Less Than You Should :   No   SDoH Shut Off Services to Your Home :   No   SDoH No Regular Place to Live :   No   SDoH Needed Provider but Costs too Much :   No   SDoH Help Reading and Writing Paper Work :   Yes   SDoH Feel Lonely Often :   Yes   (Comment: ocassional loneliness, pt reports she has good family support and her mental health counselor that she calls on whenever needed [Daysi Motley LPN - 12/15/2021 13:43 CST] )   SDoH Missed Appt/Meds- No Transportation :   No   SDoH Call Dr To Be Seen Right Away :   No   SDoH Medical Problems Cause ED Visit :   Yes   SDoH Other Problems Affecting Health :   Yes   SDoH Other Problems Details :   Mental health concerns   (Comment: pt reports her mental health provider is KAYA Del Rio, mental health counselor weekly home visits both with [employer]. pt caitlin si/hi and mental health crisis. encouraged pt to visit ED for mental health crisis [Daysi Motley LPN - 12/15/2021 13:43 CST] )   SDoH  Reviewed :   Yes   SDoH Dentist Seen in Last Year :   No   Daysi Motley LPN - 12/15/2021 13:43 CST   Prescriptions   Medication Reconcilation Completed :   Unknown   Medication Prescriptions Filled After DC :   No medications prescribed at discharge   Questions About Meds Prescribed at DC :   Denies any questions or concerns                    Daysi Motley LPN - 12/15/2021 13:43 CST   Appointments   Follow-Up Appt Scheduled :   No   (Comment: pt reports she is working on changing pcp to [facility] closer to her, but still has current pcp for now [Tiesha Daysi WEAVER - 12/15/2021 13:43 CST] )   Follow-Up Appointment Status :   Has not had opportunity to call for appointment   PCP Visit Within Year :   Yes   PCP Visit Upcoming Reason :   Regular visit   PCP Visit One Year Date :   12/14/2021 CST   Follow-Up Specialist Appt Scheduled :   Yes   Type of Specialist :   12/17/21 at 7:15 am with [facility]  MENTAL HEALTH PROVIDER F/U IN 6 WEEKS, PT DOES NOT REMEMBER DATE, HAS IT WRITTEN DOWN    MENTAL HEALTH COUNSELOR WEEKLY VISITS   Daysi Motley LPN - 12/15/2021 13:43 CST   Navigation   Initial Assesment Completed By :   Phone   ED FIN :   [id number]   MCIP Navigation Call Log :   Initial MCIP contact   Referral To HE Care :   MCIP Navigation   Transportation Arrangements Made :   Yes   Providers Patient Visited Last Year :   [facility]  SHARRON DUPREE (PCP)  ANIYA KIRKPATRICK (MENTAL HEALTH PROVIDER)  [employer] MENTAL HEALTH COUNSELOR   Root Causes for High-ED Utilization :   Chronic conditions, Mental Health Care   Plan: :   Educated patient on process of establishing PCP, Educated on appropriate ED utilization, Educated on alternate means of health care; ie urgent care when PCP not available, Educated on importance of follow up care with PCP, Referred to community resources; ie [place], Educated on importance of follow up preventative dental care with dentist,  Budget-friendly health eating education given, Other: MAIL SODH TO PT PER PT REQUEST, VERIFIED PT ADDRESS WITH PT   Participation in Activity Designed to Address Lack of Annual Ambulatory or Preventative Care Visit? :   Yes   Participation in Activity Designed to Address Avoidable ED Utilization? :   Yes   During Current Measurement Year, Did Enrollee Receive Education Regarding Outpatient Primary Care Options? :   Yes   During Current Measurement Year, Did Enrollee Receive an Appt Reminder 24-48 Hours Before a Scheduled Appt? :   Yes   During Current Measurement Year, Did the Network Provider Schedule and Appt or Provide a Referral to Enrollee? :   Yes   Education Provided :   Verbal, Written   Daysi Motley LPN - 12/15/2021 13:43 CST

## 2022-07-18 NOTE — PROGRESS NOTES
Appointment reminder:    pretty dalton to remind pt of appt for tomorrow with [facility] endo 7/18/22 at 8am.

## 2022-07-18 NOTE — PROGRESS NOTES
original encounter date 3/30/22 in [employer] EMR info. placed in Epic for continuity purposes.    HealthE Care Entered On:  3/30/2022 16:49 CDT    Performed On:  3/30/2022 16:41 CDT by Daysi Motley LPN               Discharge Past 30 Days   Visit to the Hospital in the Last 30 Days :   Emergency department (ED) visit   Reason for Choosing ED for Care :   Believes the problem too serious for doctor office or clinic   Perception of Change in Health Status DC :   Improving   Discharged To :   Home independently   DC Instructions :   Received discharge instructions , Understands discharge instructions    Education Provided :   Chronic disease process, ED utilization, Importance of keeping appointments, Community resources, Medication Compliance, Diet Compliance, Other: benefits of pcp and all providers and ancillary care    (Comment: budget friendly healthy eating [Daysi Motley LPN - 3/30/2022 16:41 CDT] )   Patient pregnant :   No   Daysi Motley LPN - 3/30/2022 16:41 CDT   Barriers to Care   SDoH Eat Less Than You Should :   No   SDoH Shut Off Services to Your Home :   No   SDoH No Regular Place to Live :   No   SDoH Needed Provider but Costs too Much :   No   SDoH Help Reading and Writing Paper Work :   No   SDoH Feel Lonely Often :   No   SDoH Missed Appt/Meds- No Transportation :   No   SDoH Call Dr To Be Seen Right Away :   No   SDoH Medical Problems Cause ED Visit :   Yes   SDoH Other Problems Affecting Health :   Yes   SDoH Other Problems Details :   Mental health concerns   (Comment: PT DENIES SI/HI AND MENTAL HEALTH CRISIS [Daysi Motley LPN - 3/30/2022 16:41 CDT] )   SDoH Reviewed :   Yes   SDoH Dentist Seen in Last Year :   No   Daysi Motley LPN - 3/30/2022 16:41 CDT   Prescriptions   Medication Reconcilation Completed :   Unknown   Medication Prescriptions Filled After DC :   Other: PT REPORTS DID NOT  RXS, STRESSED IMPORTANCE OF PICKING UP  RXS AND TAKING AS DIRECTED, PT REPORTS SHE WILL  TODAY AND VOICES UNDERSTANDING   Tiesha WEAVERDaysi - 3/30/2022 16:41 CDT   Navigation   Initial Assesment Completed By :   Phone   ED FIN :   [id number]   [facility] Navigation Call Log :   Subsequent call   Transportation Arrangements Made :   Yes   Providers Patient Visited Last Year :    [facility]  CATIE NIETO, FNP-C (PCP)  ANIYA KIRKPATRICK (MENTAL HEALTH PROVIDER)  [employer] MENTAL HEALTH COUNSELOR    [facility] ENDO  DR. EPHRAIM VANCE (GI)  Quyen Rodriguez, MSN, APRN, FNP-BC    Root Causes for High-ED Utilization :   Chronic conditions, Mental Health Care   Plan: :   Educated on appropriate ED utilization, Educated on alternate means of health care; ie urgent care when PCP not available, Educated on importance of follow up care with PCP, Educated on importance of follow up preventative dental care with dentist, Budget-friendly health eating education given, Other: PT REPORTS SHE HAS NOT CHECK MAIL TO SEE IF SDOH ED/RESOURCE CAME IN, REPORTS SHE WILL CHECK MAIL   Participation in Activity Designed to Address Lack of Annual Ambulatory or Preventative Care Visit? :   Yes   Participation in Activity Designed to Address Avoidable ED Utilization? :   Yes   During Current Measurement Year, Did Enrollee Receive Education Regarding Outpatient Primary Care Options? :   Yes   During Current Measurement Year, Did Enrollee Receive an Appt Reminder 24-48 Hours Before a Scheduled Appt? :   Yes   During Current Measurement Year, Did the Network Provider Schedule and Appt or Provide a Referral to Enrollee? :   Yes   Education Provided :   Verbal   Tiesha WEAVERDaysi Gamino - 3/30/2022 16:41 CDT

## 2022-07-19 ENCOUNTER — OFFICE VISIT (OUTPATIENT)
Dept: ENDOCRINOLOGY | Facility: CLINIC | Age: 27
End: 2022-07-19
Payer: MEDICAID

## 2022-07-19 VITALS
BODY MASS INDEX: 20.6 KG/M2 | HEART RATE: 92 BPM | RESPIRATION RATE: 15 BRPM | SYSTOLIC BLOOD PRESSURE: 96 MMHG | HEIGHT: 61 IN | WEIGHT: 109.13 LBS | TEMPERATURE: 98 F | DIASTOLIC BLOOD PRESSURE: 64 MMHG

## 2022-07-19 DIAGNOSIS — Z11.59 ENCOUNTER FOR HEPATITIS C SCREENING TEST FOR LOW RISK PATIENT: ICD-10-CM

## 2022-07-19 DIAGNOSIS — E04.9 ENLARGED THYROID: ICD-10-CM

## 2022-07-19 DIAGNOSIS — E05.00 GRAVES DISEASE: Primary | ICD-10-CM

## 2022-07-19 DIAGNOSIS — Z72.0 TOBACCO USE: ICD-10-CM

## 2022-07-19 DIAGNOSIS — R00.2 PALPITATIONS: ICD-10-CM

## 2022-07-19 DIAGNOSIS — E04.2 MULTINODULAR GOITER: ICD-10-CM

## 2022-07-19 PROCEDURE — 3078F PR MOST RECENT DIASTOLIC BLOOD PRESSURE < 80 MM HG: ICD-10-PCS | Mod: CPTII,,, | Performed by: NURSE PRACTITIONER

## 2022-07-19 PROCEDURE — 3078F DIAST BP <80 MM HG: CPT | Mod: CPTII,,, | Performed by: NURSE PRACTITIONER

## 2022-07-19 PROCEDURE — 1159F PR MEDICATION LIST DOCUMENTED IN MEDICAL RECORD: ICD-10-PCS | Mod: CPTII,,, | Performed by: NURSE PRACTITIONER

## 2022-07-19 PROCEDURE — 3008F PR BODY MASS INDEX (BMI) DOCUMENTED: ICD-10-PCS | Mod: CPTII,,, | Performed by: NURSE PRACTITIONER

## 2022-07-19 PROCEDURE — 99214 PR OFFICE/OUTPT VISIT, EST, LEVL IV, 30-39 MIN: ICD-10-PCS | Mod: S$PBB,,, | Performed by: NURSE PRACTITIONER

## 2022-07-19 PROCEDURE — 1159F MED LIST DOCD IN RCRD: CPT | Mod: CPTII,,, | Performed by: NURSE PRACTITIONER

## 2022-07-19 PROCEDURE — 1160F PR REVIEW ALL MEDS BY PRESCRIBER/CLIN PHARMACIST DOCUMENTED: ICD-10-PCS | Mod: CPTII,,, | Performed by: NURSE PRACTITIONER

## 2022-07-19 PROCEDURE — 3074F PR MOST RECENT SYSTOLIC BLOOD PRESSURE < 130 MM HG: ICD-10-PCS | Mod: CPTII,,, | Performed by: NURSE PRACTITIONER

## 2022-07-19 PROCEDURE — 3074F SYST BP LT 130 MM HG: CPT | Mod: CPTII,,, | Performed by: NURSE PRACTITIONER

## 2022-07-19 PROCEDURE — 3008F BODY MASS INDEX DOCD: CPT | Mod: CPTII,,, | Performed by: NURSE PRACTITIONER

## 2022-07-19 PROCEDURE — 99214 OFFICE O/P EST MOD 30 MIN: CPT | Mod: S$PBB,,, | Performed by: NURSE PRACTITIONER

## 2022-07-19 PROCEDURE — 1160F RVW MEDS BY RX/DR IN RCRD: CPT | Mod: CPTII,,, | Performed by: NURSE PRACTITIONER

## 2022-07-19 PROCEDURE — 99215 OFFICE O/P EST HI 40 MIN: CPT | Mod: PBBFAC | Performed by: NURSE PRACTITIONER

## 2022-07-19 RX ORDER — FAMOTIDINE 40 MG/1
40 TABLET, FILM COATED ORAL NIGHTLY
COMMUNITY
Start: 2022-07-14 | End: 2022-10-06 | Stop reason: CLARIF

## 2022-07-19 RX ORDER — DICYCLOMINE HYDROCHLORIDE 20 MG/1
20 TABLET ORAL EVERY 6 HOURS
COMMUNITY
Start: 2022-07-17 | End: 2024-03-21 | Stop reason: SDUPTHER

## 2022-07-19 RX ORDER — SODIUM PICOSULFATE, MAGNESIUM OXIDE, AND ANHYDROUS CITRIC ACID 10; 3.5; 12 MG/160ML; G/160ML; G/160ML
LIQUID ORAL
COMMUNITY
Start: 2022-06-07 | End: 2022-10-06 | Stop reason: CLARIF

## 2022-07-19 RX ORDER — AMOXICILLIN AND CLAVULANATE POTASSIUM 875; 125 MG/1; MG/1
TABLET, FILM COATED ORAL
COMMUNITY
Start: 2022-07-18 | End: 2022-09-01 | Stop reason: ALTCHOICE

## 2022-07-19 RX ORDER — METHIMAZOLE 5 MG/1
5 TABLET ORAL DAILY
Qty: 30 TABLET | Refills: 3 | Status: SHIPPED | OUTPATIENT
Start: 2022-07-19 | End: 2023-02-27

## 2022-07-19 NOTE — PROGRESS NOTES
Subjective:       Patient ID: Royce Quigley is a 27 y.o. female.    Chief Complaint: No chief complaint on file.    Previous Endocrine Clinic Note:     10/15/2020 Telemedicine Visit Note- Endocrine Clinic: 26 y/o female schedule for endocrine clinic F/U for Graves disease. Pt is 7 weeks post-partum and currently on MMI 5mg daily. TSH 0.043 Free T4 1.20 T4 9.1.  Patient reports a couple of weeks after pregnancy and she felt like she was hyperthyroid and had an old prescription of MMI at home and started on 5 mg, then she states 3 weeks ago she went to her primary care provider and was prescribed a new prescription of MMI 5 mg.  She states she has been on the new prescription that is not  for approximately 3 weeks.  She reports that her symptoms of palpitations, tremors and anxiety have slightly improved but she still continues with palpitations, weight loss.    2021 Endocrine Clinic Note: 26 year old female scheduled today's endocrine clinic follow-up for Graves' disease.  Patient was previously in remission with pregnancy and delivered 2020.  4 to 5 weeks after pregnancy patient felt like she was returning to hyperthyroidism and had restarted her MMI. Last documentation 2020 patient's TSH was 10.521 MMI 10 mg twice a day was reduced to once a day. She is currently MMI 5mg (1/2 of 10mg tablet), She reports she was seeing a health care provider in [place] who changed her medication. Pt states she feel speedy now, with palpitations, anxiety. Pt weight has increased. Will check labs today. (1)    2021 Endocrine Clinic Note: 26 year old female scheduled today for Endocrine follow-up.  History of Graves' disease/palpitations.  Patient has continued her MMI 5 mg and states when she ran out she had an old prescription that was 2-3 years old that she restarted she states she is severely worried about going back into hyperthyroidism.  Current labs TSH 0.855, free T4 1.0015 mg of MMI.   Discussed with patient holding MMI to assess for remission patient states that she has a great fear from returning to hyperthyroidism and she is currently symptomatic with weight loss and palpitations and does not want to stop her MMI.  Instructed patient we will decrease to a half of a tablet and recheck labs in 3 weeks and to reconsider holding MMI.  Tobacco use patient smokes half a pack per day encourage cessation. (1)    3/15/2022 endocrine clinic note: 27-year-old female scheduled today for endocrine clinic follow-up. History of Graves' disease/palpitations. Graves' disease patient is currently in MMI 5 mg most recent TSH On 01/25/2022 TSH 2.2292.  Patient reports continued palpitations, excessive sweating and reports weight loss.  Patient also has a history of anxiety.  Patient is currently undergoing a cardiac work-up for palpitations and also was referred to gastro for an upper scope but has not completed yet. Enlarged thyroid multinodular goiter patient had ultrasound 6/28/2019 IMPRESSION: Heterogenicity in hypervascularity of the thyroid gland might be related to thyroid disease. Subcentimeter nodules as above do not meet criteria for biopsy.     Current Endocrine Clinic Note 07/19/2022     27-year-old female scheduled today for endocrine clinic follow-up. History of Graves disease, multinodular goiter, enlarged thyroid, palpitations. Previous TSH 2.2292 on 01/25/2022 patient was previously held off MI and reported symptoms of palpitation, increased anxiety and tremors. Currently she is on MMI 5 mg. Palpitations patient reports occasional palpitations she states when she went to her PCP 1 week ago her  today patient's heart rate is 92 patient states compliance with her propanolol.  Multinodular goiter/enlarged thyroid repeat ultrasound 03/23/2022 Mild diffuse heterogeneity and hypervascularity at the thyroid gland suggesting diffuse thyroiditis with several small benign sub centimeter nodules as  detailed above. No significant interval change compared to the thyroid ultrasound in 2019 aside from interval decrease in size of the left thyroid nodule.  Patient denies any new palpable nodules but does report mild dysphagia at times.    Tobacco use patient is smoking 1 pack per day states she does not want states patient's program at this time she states she is not want to quit due to her anxiety.    Narrative & Impression  Thyroid ultrasound     INDICATION: 27-year-old woman with enlarged thyroid gland/nodules.     TECHNIQUE: Real-time grayscale and color Doppler sonographic  evaluation of the thyroid gland was performed per routine protocol.     COMPARISON: Thyroid ultrasound 6/28/2019.     FINDINGS:     There is mild enlargement at the right thyroid lobe with the gland  otherwise normal and size and contour. The right thyroid lobe measures  5.4 x 1.6 x 1.9 cm and the left lobe 4.5 x 1.6 x 1.4 cm. The isthmus  measures just under 0.2 cm in AP thickness. A small colloid cyst at  the right thyroid lobe measures 5 x 3 x 4 mm and a second small and  well marginated solid hypoechoic nodule at the right posterior thyroid  lobe 5 x 3 x 5 mm. A single small colloid cyst in the left thyroid  lobe measures 3 x 2 x 3 mm. There is mild diffuse heterogeneity of the  thyroid gland parenchyma along with diffuse hypervascularity at the  gland.     IMPRESSION:     Mild diffuse heterogeneity and hypervascularity at the thyroid gland  suggesting diffuse thyroiditis with several small benign subcentimeter  nodules as detailed above. No significant interval change compared to  the thyroid ultrasound in 2019 aside from interval decrease in size of  the left thyroid nodule.  Electronically Signed By: Erasmo Pa MD  Date/Time Signed: 03/23/2022 16:27    Specimen Collected: 03/23/22 14:13 Last Resulted: 03/23/22 14:13      Review of Systems   Constitutional: Positive for fatigue. Negative for activity change and appetite change.    HENT: Positive for goiter. Negative for dental problem, hearing loss, tinnitus and trouble swallowing.    Eyes: Negative for photophobia, pain and visual disturbance.   Respiratory: Negative for cough, chest tightness and wheezing.    Cardiovascular: Negative for chest pain, palpitations and leg swelling.   Gastrointestinal: Negative for abdominal pain, constipation, diarrhea, nausea and reflux.   Endocrine: Negative for cold intolerance, heat intolerance, polydipsia and polyphagia.   Genitourinary: Negative for difficulty urinating, flank pain, hematuria, hot flashes, menstrual irregularity, menstrual problem, nocturia and urgency.   Musculoskeletal: Negative for back pain, gait problem, joint swelling, leg pain and joint deformity.   Integumentary:  Negative for color change, pallor, rash and breast discharge.   Allergic/Immunologic: Negative for environmental allergies, food allergies and immunocompromised state.   Neurological: Positive for tremors. Negative for seizures, headaches, disturbances in coordination, memory loss and coordination difficulties.   Psychiatric/Behavioral: Positive for decreased concentration. Negative for agitation, behavioral problems and sleep disturbance. The patient is nervous/anxious.          Objective:      Physical Exam  Constitutional:       General: She is not in acute distress.     Appearance: Normal appearance. She is not ill-appearing.   HENT:      Head: Normocephalic and atraumatic.      Right Ear: External ear normal.      Left Ear: External ear normal.      Nose: Nose normal. No congestion or rhinorrhea.      Mouth/Throat:      Mouth: Mucous membranes are moist.      Pharynx: Oropharynx is clear. No oropharyngeal exudate.   Eyes:      General:         Right eye: No discharge.         Left eye: No discharge.      Conjunctiva/sclera: Conjunctivae normal.      Pupils: Pupils are equal, round, and reactive to light.   Neck:      Thyroid: No thyroid mass, thyromegaly or  thyroid tenderness.   Cardiovascular:      Rate and Rhythm: Normal rate and regular rhythm.      Pulses: Normal pulses.      Heart sounds: Normal heart sounds. No murmur heard.  Pulmonary:      Effort: Pulmonary effort is normal. No respiratory distress.      Breath sounds: Normal breath sounds.   Abdominal:      General: Abdomen is flat. Bowel sounds are normal. There is no distension.      Palpations: Abdomen is soft.      Tenderness: There is no abdominal tenderness.   Musculoskeletal:         General: No swelling or tenderness. Normal range of motion.      Cervical back: Normal range of motion and neck supple. No tenderness.      Right lower leg: No edema.      Left lower leg: No edema.   Feet:      Right foot:      Skin integrity: Skin integrity normal.      Left foot:      Skin integrity: Skin integrity normal.   Lymphadenopathy:      Cervical: No cervical adenopathy.   Skin:     General: Skin is warm and dry.      Coloration: Skin is not jaundiced or pale.   Neurological:      General: No focal deficit present.      Mental Status: She is alert and oriented to person, place, and time. Mental status is at baseline.      Coordination: Coordination normal.      Gait: Gait normal.   Psychiatric:         Mood and Affect: Mood normal.         Behavior: Behavior normal.         Thought Content: Thought content normal.         Assessment:       Problem List Items Addressed This Visit        Endocrine    Graves disease - Primary    Relevant Orders    T4, Free    T3, Free ([facility])    TSH      Other Visit Diagnoses     Multinodular goiter        Enlarged thyroid        Tobacco use        Palpitations              Plan:       Graves disease  TSH 2.2292 on 01/25/2022   On MMI 5 mg once a day   TSH, free T4, free T3 today  Return to clinic in 6 months  Component Ref Range & Units 5 mo ago   (1/25/22) 9 mo ago   (9/30/21) 10 mo ago   (9/16/21) 1 yr ago   (5/5/21) 1 yr ago   (3/22/21) 2 yr ago   (7/19/20) 2 yr ago   (3/23/20)    Thyroid Stimulating Hormone 0.3500 - 4.9400 uIU/mL 2.2292  1.5176  1.6657  5.7193 High   7.0031 High   0.827    -     T4, Free; Future; Expected date: 07/19/2022  -     T3, Free ([facility]); Future; Expected date: 07/19/2022  -     TSH; Future; Expected date: 07/19/2022    Multinodular goiter  US 03/23/2022 no changes since 2019   Subcentimeter nodules     Tobacco use  1 pack a day  Not ready at this time for cessation   Education on smoking cessation given    Palpitations  On Propranolol 10 mg BID, continue propranolol   HR today 92     Encounter for hepatitis C screening test for low risk patient  Hepatitis panel today  -     Hepatitis Panel, Acute; Future; Expected date: 07/19/2022        30 minutes: This includes face to face time and non-face to face time preparing to see the patient (eg, review of tests), obtaining and/or reviewing separately obtained history, documenting clinical information in the electronic or other health record, independently interpreting results and communicating results to the patient/family/caregiver, or care coordinator.

## 2022-07-20 ENCOUNTER — TELEPHONE (OUTPATIENT)
Dept: ENDOCRINOLOGY | Facility: CLINIC | Age: 27
End: 2022-07-20
Payer: MEDICAID

## 2022-07-20 NOTE — TELEPHONE ENCOUNTER
----- Message from Meredith Sales NP sent at 7/19/2022  3:16 PM CDT -----  Let the patient know her thyroid is normal and she can continue her current MMI 5 mg dose. Refills sent in.

## 2022-07-20 NOTE — TELEPHONE ENCOUNTER
Spoke to patient and let her know her thyroid levels were normal and to continue MMI dose, with refills sent in. Patient verbalized understanding.

## 2022-08-01 ENCOUNTER — HOSPITAL ENCOUNTER (EMERGENCY)
Facility: HOSPITAL | Age: 27
Discharge: HOME | End: 2022-08-01
Attending: STUDENT IN AN ORGANIZED HEALTH CARE EDUCATION/TRAINING PROGRAM
Payer: MEDICAID

## 2022-08-01 VITALS
RESPIRATION RATE: 18 BRPM | BODY MASS INDEX: 21.56 KG/M2 | HEIGHT: 60 IN | WEIGHT: 109.81 LBS | HEART RATE: 72 BPM | DIASTOLIC BLOOD PRESSURE: 72 MMHG | TEMPERATURE: 99 F | OXYGEN SATURATION: 99 % | SYSTOLIC BLOOD PRESSURE: 107 MMHG

## 2022-08-01 DIAGNOSIS — R10.12 LEFT UPPER QUADRANT ABDOMINAL PAIN: Primary | ICD-10-CM

## 2022-08-01 LAB
ALBUMIN SERPL-MCNC: 4.3 GM/DL (ref 3.5–5)
ALBUMIN/GLOB SERPL: 1.3 RATIO (ref 1.1–2)
ALP SERPL-CCNC: 57 UNIT/L (ref 40–150)
ALT SERPL-CCNC: 9 UNIT/L (ref 0–55)
APPEARANCE UR: ABNORMAL
AST SERPL-CCNC: 10 UNIT/L (ref 5–34)
B-HCG UR QL: NEGATIVE
BACTERIA #/AREA URNS AUTO: ABNORMAL /HPF
BASOPHILS # BLD AUTO: 0.06 X10(3)/MCL (ref 0–0.2)
BASOPHILS NFR BLD AUTO: 0.8 %
BILIRUB UR QL STRIP.AUTO: NEGATIVE MG/DL
BILIRUBIN DIRECT+TOT PNL SERPL-MCNC: 0.3 MG/DL
BUN SERPL-MCNC: 9.4 MG/DL (ref 7–18.7)
CALCIUM SERPL-MCNC: 9.5 MG/DL (ref 8.4–10.2)
CHLORIDE SERPL-SCNC: 105 MMOL/L (ref 98–107)
CO2 SERPL-SCNC: 25 MMOL/L (ref 22–29)
COLOR UR AUTO: YELLOW
CREAT SERPL-MCNC: 0.76 MG/DL (ref 0.55–1.02)
CTP QC/QA: YES
EOSINOPHIL # BLD AUTO: 0.33 X10(3)/MCL (ref 0–0.9)
EOSINOPHIL NFR BLD AUTO: 4.5 %
ERYTHROCYTE [DISTWIDTH] IN BLOOD BY AUTOMATED COUNT: 15.1 % (ref 11.5–17)
GLOBULIN SER-MCNC: 3.4 GM/DL (ref 2.4–3.5)
GLUCOSE SERPL-MCNC: 81 MG/DL (ref 74–100)
GLUCOSE UR QL STRIP.AUTO: NORMAL MG/DL
HCT VFR BLD AUTO: 41.2 % (ref 37–47)
HGB BLD-MCNC: 12.4 GM/DL (ref 12–16)
HYALINE CASTS #/AREA URNS LPF: ABNORMAL /LPF
IMM GRANULOCYTES # BLD AUTO: 0.01 X10(3)/MCL (ref 0–0.04)
IMM GRANULOCYTES NFR BLD AUTO: 0.1 %
KETONES UR QL STRIP.AUTO: NEGATIVE MG/DL
LEUKOCYTE ESTERASE UR QL STRIP.AUTO: 250 UNIT/L
LIPASE SERPL-CCNC: 12 U/L
LYMPHOCYTES # BLD AUTO: 2.45 X10(3)/MCL (ref 0.6–4.6)
LYMPHOCYTES NFR BLD AUTO: 33.2 %
MCH RBC QN AUTO: 26.7 PG (ref 27–31)
MCHC RBC AUTO-ENTMCNC: 30.1 MG/DL (ref 33–36)
MCV RBC AUTO: 88.6 FL (ref 80–94)
MONOCYTES # BLD AUTO: 0.64 X10(3)/MCL (ref 0.1–1.3)
MONOCYTES NFR BLD AUTO: 8.7 %
MUCOUS THREADS URNS QL MICRO: ABNORMAL /LPF
NEUTROPHILS # BLD AUTO: 3.9 X10(3)/MCL (ref 2.1–9.2)
NEUTROPHILS NFR BLD AUTO: 52.7 %
NITRITE UR QL STRIP.AUTO: NEGATIVE
NRBC BLD AUTO-RTO: 0 %
PH UR STRIP.AUTO: 6 [PH]
PLATELET # BLD AUTO: 261 X10(3)/MCL (ref 130–400)
PMV BLD AUTO: 11.4 FL (ref 7.4–10.4)
POTASSIUM SERPL-SCNC: 3.5 MMOL/L (ref 3.5–5.1)
PROT SERPL-MCNC: 7.7 GM/DL (ref 6.4–8.3)
PROT UR QL STRIP.AUTO: ABNORMAL MG/DL
RBC # BLD AUTO: 4.65 X10(6)/MCL (ref 4.2–5.4)
RBC #/AREA URNS AUTO: ABNORMAL /HPF
RBC UR QL AUTO: ABNORMAL UNIT/L
SODIUM SERPL-SCNC: 141 MMOL/L (ref 136–145)
SP GR UR STRIP.AUTO: 1.02
SQUAMOUS #/AREA URNS LPF: ABNORMAL /HPF
UROBILINOGEN UR STRIP-ACNC: NORMAL MG/DL
WBC # SPEC AUTO: 7.4 X10(3)/MCL (ref 4.5–11.5)
WBC #/AREA URNS AUTO: ABNORMAL /HPF

## 2022-08-01 PROCEDURE — 83690 ASSAY OF LIPASE: CPT | Performed by: PHYSICIAN ASSISTANT

## 2022-08-01 PROCEDURE — 81001 URINALYSIS AUTO W/SCOPE: CPT | Performed by: PHYSICIAN ASSISTANT

## 2022-08-01 PROCEDURE — 87088 URINE BACTERIA CULTURE: CPT | Performed by: PHYSICIAN ASSISTANT

## 2022-08-01 PROCEDURE — 25000003 PHARM REV CODE 250: Performed by: PHYSICIAN ASSISTANT

## 2022-08-01 PROCEDURE — 36415 COLL VENOUS BLD VENIPUNCTURE: CPT | Performed by: PHYSICIAN ASSISTANT

## 2022-08-01 PROCEDURE — 85025 COMPLETE CBC W/AUTO DIFF WBC: CPT | Performed by: PHYSICIAN ASSISTANT

## 2022-08-01 PROCEDURE — 80053 COMPREHEN METABOLIC PANEL: CPT | Performed by: PHYSICIAN ASSISTANT

## 2022-08-01 PROCEDURE — 81025 URINE PREGNANCY TEST: CPT | Performed by: PHYSICIAN ASSISTANT

## 2022-08-01 PROCEDURE — 99284 EMERGENCY DEPT VISIT MOD MDM: CPT | Mod: 25

## 2022-08-01 RX ORDER — DICYCLOMINE HYDROCHLORIDE 10 MG/1
20 CAPSULE ORAL
Status: DISCONTINUED | OUTPATIENT
Start: 2022-08-01 | End: 2022-08-01

## 2022-08-01 RX ORDER — KETOROLAC TROMETHAMINE 10 MG/1
10 TABLET, FILM COATED ORAL
Status: COMPLETED | OUTPATIENT
Start: 2022-08-01 | End: 2022-08-01

## 2022-08-01 RX ADMIN — KETOROLAC TROMETHAMINE 10 MG: 10 TABLET, FILM COATED ORAL at 10:08

## 2022-08-02 NOTE — ED PROVIDER NOTES
"Encounter Date: 8/1/2022       History     Chief Complaint   Patient presents with    Abdominal Pain     Pt reports rt side abdominal pain radiating to back. Pt reports chronic issues for over a year. Follows with her pcp regularly. Reports "attacks" usually pass but this one has been going on since yesterday. Denies n/v or urinary problems.      Royce Quigley is a 27 y.o. female who presents to the ED with complaints of abdominal pain that started 1 day(s) ago.    She states this is a chronic problem and has been occurring for "years." She states she has seen GI and had a EGD and it was normal. States all of her lab results have always been normal. She reports the pain as a spasm type pain. She states the "attacks" normally last 3-6 hours but this one has lasted for approximately 24 hours. Took Bentyl prior to coming without relief. Denies nausea, vomiting, diarrhea, dysuria, fever, chills.     The history is provided by the patient. No  was used.     Review of patient's allergies indicates:   Allergen Reactions    Diphenhydramine Anxiety and Other (See Comments)     Past Medical History:   Diagnosis Date    Graves disease      Past Surgical History:   Procedure Laterality Date    TONSILLECTOMY       No family history on file.  Social History     Tobacco Use    Smoking status: Current Every Day Smoker     Packs/day: 1.00    Smokeless tobacco: Never Used   Substance Use Topics    Alcohol use: Never    Drug use: Not Currently     Review of Systems   Constitutional: Negative for chills, fatigue and fever.   HENT: Negative for congestion, ear pain, sinus pain and sore throat.    Eyes: Negative for pain.   Respiratory: Negative for cough, chest tightness and shortness of breath.    Cardiovascular: Negative for chest pain.   Gastrointestinal: Positive for abdominal pain. Negative for constipation, diarrhea, nausea and vomiting.   Genitourinary: Negative for dysuria, pelvic pain, vaginal " bleeding, vaginal discharge and vaginal pain.   Musculoskeletal: Negative for back pain and joint swelling.   Skin: Negative for color change and rash.   Neurological: Negative for dizziness and weakness.   Psychiatric/Behavioral: Negative for behavioral problems and confusion.       Physical Exam     Initial Vitals [08/01/22 1910]   BP Pulse Resp Temp SpO2   101/67 82 18 98.9 °F (37.2 °C) 100 %      MAP       --         Physical Exam    Constitutional: She appears well-developed and well-nourished.   HENT:   Head: Normocephalic and atraumatic.   Nose: Nose normal.   Eyes: EOM are normal. Pupils are equal, round, and reactive to light.   Neck: Neck supple. No thyromegaly present. No JVD present.   Normal range of motion.  Cardiovascular: Normal rate, regular rhythm, normal heart sounds and intact distal pulses.   No murmur heard.  Pulmonary/Chest: Breath sounds normal. No respiratory distress. She has no wheezes. She has no rhonchi. She has no rales. She exhibits no tenderness.   Abdominal: Abdomen is soft. Bowel sounds are normal. She exhibits no distension. There is no abdominal tenderness. There is no rebound and no guarding.   Musculoskeletal:         General: No tenderness or edema. Normal range of motion.      Cervical back: Normal range of motion and neck supple.     Lymphadenopathy:     She has no cervical adenopathy.   Neurological: She is alert and oriented to person, place, and time.   Skin: Skin is warm and dry. Capillary refill takes less than 2 seconds.   Psychiatric: She has a normal mood and affect. Thought content normal.         ED Course   Procedures  Labs Reviewed   URINALYSIS, REFLEX TO URINE CULTURE - Abnormal; Notable for the following components:       Result Value    Appearance, UA Turbid (*)     Protein, UA Trace (*)     Blood, UA Trace (*)     Leukocyte Esterase,   (*)     WBC, UA 21-50 (*)     Squamous Epithelial Cells, UA Many (*)     Mucous, UA Many (*)     All other components  within normal limits   CBC WITH DIFFERENTIAL - Abnormal; Notable for the following components:    MCH 26.7 (*)     MCHC 30.1 (*)     MPV 11.4 (*)     All other components within normal limits   LIPASE - Normal   CULTURE, URINE   CBC W/ AUTO DIFFERENTIAL    Narrative:     The following orders were created for panel order CBC W/ AUTO DIFFERENTIAL.  Procedure                               Abnormality         Status                     ---------                               -----------         ------                     CBC with Differential[[id number]]        Abnormal            Final result                 Please view results for these tests on the individual orders.   COMPREHENSIVE METABOLIC PANEL   EXTRA TUBES    Narrative:     The following orders were created for panel order EXTRA TUBES.  Procedure                               Abnormality         Status                     ---------                               -----------         ------                     Light Blue Top Hold[[id number]]                              In process                 Gold Top Hold[[id number]]                                    In process                   Please view results for these tests on the individual orders.   LIGHT BLUE TOP HOLD   GOLD TOP HOLD   POCT URINE PREGNANCY          Imaging Results          CT Abdomen Pelvis  Without Contrast (Preliminary result)  Result time 08/01/22 21:59:29    Preliminary result by Interface, Rad Results In (08/01/22 21:59:29)                 Narrative:    START OF REPORT:  Technique: CT of the abdomen and pelvis was performed with axial images as well as sagittal and coronal reconstruction images without intravenous contrast.    Comparison: None available.    Clinical History: Abdominal pain, acute, nonlocalized.    Dosage Information: Automated Exposure Control was utilized.    Findings:  Lines and Tubes: None.  Thorax:  Lungs: The visualized lung bases appear unremarkable.  Pleura: No effusions  or thickening. No pneumothorax is seen in the visualized lung bases.  Heart: The heart size is within normal limits.  Abdomen:  Abdominal Wall: No abdominal wall pathology is seen.  Liver: The liver appears unremarkable.  Biliary System: No extrahepatic biliary duct dilatation is seen.  Gallbladder: The gallbladder appears unremarkable.  Pancreas: The pancreas appears unremarkable.  Spleen: The spleen appears unremarkable.  Adrenals: The adrenal glands appear unremarkable.  Kidneys: The kidneys appear unremarkable with no stones cysts masses or hydronephrosis.  Aorta: The abdominal aorta appears unremarkable.  IVC: Unremarkable.  Bowel:  Esophagus: The visualized esophagus appears unremarkable.  Stomach: The stomach appears unremarkable.  Duodenum: Unremarkable appearing duodenum.  Small Bowel: The small bowel appears unremarkable.  Colon: Nondistended.  Appendix: The appendix appears unremarkable seen on series 2 image 57.  Peritoneum: No intraperitoneal free air or ascites is seen.    Pelvis:  Bladder: The bladder appears unremarkable.  Female:  Uterus: The uterus appears unremarkable.  Ovaries: The ovaries appear unremarkable. No adnexal masses are seen.    Bony structures:  Dorsal Spine: Bilateral pars interarticularis defects are seen at L5-S1 level. The visualized spine otherwise appears unremarkable.  Bony Pelvis: The visualized bony structures of the pelvis appear unremarkable.      Impression:  1. No acute intraabdominal or pelvic solid organ or bowel pathology identified. Details and findings as discussed.                      Preliminary result by Leobardo Valadez Jr., MD (08/01/22 21:59:29)                 Narrative:    START OF REPORT:  Technique: CT of the abdomen and pelvis was performed with axial images as well as sagittal and coronal reconstruction images without intravenous contrast.    Comparison: None available.    Clinical History: Abdominal pain, acute, nonlocalized.    Dosage Information:  Automated Exposure Control was utilized.    Findings:  Lines and Tubes: None.  Thorax:  Lungs: The visualized lung bases appear unremarkable.  Pleura: No effusions or thickening. No pneumothorax is seen in the visualized lung bases.  Heart: The heart size is within normal limits.  Abdomen:  Abdominal Wall: No abdominal wall pathology is seen.  Liver: The liver appears unremarkable.  Biliary System: No extrahepatic biliary duct dilatation is seen.  Gallbladder: The gallbladder appears unremarkable.  Pancreas: The pancreas appears unremarkable.  Spleen: The spleen appears unremarkable.  Adrenals: The adrenal glands appear unremarkable.  Kidneys: The kidneys appear unremarkable with no stones cysts masses or hydronephrosis.  Aorta: The abdominal aorta appears unremarkable.  IVC: Unremarkable.  Bowel:  Esophagus: The visualized esophagus appears unremarkable.  Stomach: The stomach appears unremarkable.  Duodenum: Unremarkable appearing duodenum.  Small Bowel: The small bowel appears unremarkable.  Colon: Nondistended.  Appendix: The appendix appears unremarkable seen on series 2 image 57.  Peritoneum: No intraperitoneal free air or ascites is seen.    Pelvis:  Bladder: The bladder appears unremarkable.  Female:  Uterus: The uterus appears unremarkable.  Ovaries: The ovaries appear unremarkable. No adnexal masses are seen.    Bony structures:  Dorsal Spine: Bilateral pars interarticularis defects are seen at L5-S1 level. The visualized spine otherwise appears unremarkable.  Bony Pelvis: The visualized bony structures of the pelvis appear unremarkable.      Impression:  1. No acute intraabdominal or pelvic solid organ or bowel pathology identified. Details and findings as discussed.                                   Medications   ketorolac tablet 10 mg (10 mg Oral Given 8/1/22 9718)           APC / Resident Notes:   I was not physically present during the history, exam or disposition of this patient.  I was available all  times for consultation. (angie)        ED Course as of 08/02/22 0252   Mon Aug 01, 2022   0301 Reassessed patient at this time. She is sitting comfortably in the exam chair. Discussed lab results, CT scan, diagnosis, and treatment plan with her. She verbalized understanding. I have recommended a follow up with GI. She verbalized understanding. Stable for discharge.  [VJ]      ED Course User Index  [VJ] Concepcion Gandara PA-C             Clinical Impression:   Final diagnoses:  [R10.12] Left upper quadrant abdominal pain (Primary)          ED Disposition Condition    Discharge Stable        ED Prescriptions     None        Follow-up Information     Follow up With Specialties Details Why Contact Info    Shadi Biggs NP Family Medicine   [address] [place] [address] [address]I QUIROZ [id number]  [phone]      [facility] Emergency Medicine In 3 days As needed, If symptoms worsen [address] [facility] [id number] [phone]           Concepcion Gandara PA-C  08/01/22 230       Uriel Carver MD  08/02/22 0253

## 2022-08-03 ENCOUNTER — PATIENT OUTREACH (OUTPATIENT)
Dept: EMERGENCY MEDICINE | Facility: HOSPITAL | Age: 27
End: 2022-08-03
Payer: MEDICAID

## 2022-08-03 NOTE — PROGRESS NOTES
SPOKE TO PT FOR F/U AND POST ED VISIT. PT REPORTS SHE IS FEELING BETTER, ADVISED PT TO CALL AND OBTAIN F/U APPT WITH GI, PT REPORTS SHE ALWAYS HAVE ISSUES WHEN CALLING HER GI FOR AN APPT, OFFERED PT ASSISTANCE WITH TRYING TO OBTAIN A F/U APPT WITH GI, PT REPORTS SHE WILL CALL, PT DOES REPORTS THAT ED PROVIDER AND HER DISCUSSED GETTING A REFERRAL TO [facility] GI, ADVISED PT NOT NOTED, PT REQUEST ASSISTANCE WITH REACHING OUT TO [facility] ED TO CHECK ON GI REFERRAL. DISCUSSED MEDICATION AND BUDGET HEALTHY EATING COMPLIANCE, BENEFITS OF PCP AND ALL PROVIDERS AND ANCILLARY CARE, UPCOMING SCHEDULED APPTS, STRESSED IMPORTANCE OF ATTENDING ALL SCHEDULED APPT. PT DENIES SI/HI AND MENTAL HEALTH CRISIS. CALLED [facility] ED spoke to nurse with [facility] ed, discuss pt reports ed 8/1/22, her provider discussed that ed provider would seen referral to [facility] GI, none noted, Yahaira reports she will forward message to person who takes care of referral. Called pt and notified pt of noted discussion from [facility] ed nurse and they will send reques and message to person whom handles referrals. pt voices uderstanding and appreciation.  PT VOICES UNDERSTANDING TO INSTRUCTIONS GIVEN AND APPRECIATION.

## 2022-08-04 LAB — BACTERIA UR CULT: NO GROWTH

## 2022-08-30 ENCOUNTER — LAB VISIT (OUTPATIENT)
Dept: LAB | Facility: HOSPITAL | Age: 27
End: 2022-08-30
Attending: INTERNAL MEDICINE
Payer: MEDICAID

## 2022-08-30 DIAGNOSIS — R10.13 ABDOMINAL PAIN, EPIGASTRIC: ICD-10-CM

## 2022-08-30 DIAGNOSIS — K52.9 CHRONIC COLITIS: ICD-10-CM

## 2022-08-30 DIAGNOSIS — R10.9 STOMACH ACHE: Primary | ICD-10-CM

## 2022-08-30 DIAGNOSIS — R19.4 FREQUENT BOWEL MOVEMENTS: ICD-10-CM

## 2022-08-30 LAB
ALBUMIN SERPL-MCNC: 4.6 GM/DL (ref 3.5–5)
ALBUMIN/GLOB SERPL: 1.5 RATIO (ref 1.1–2)
ALP SERPL-CCNC: 52 UNIT/L (ref 40–150)
ALT SERPL-CCNC: 8 UNIT/L (ref 0–55)
AST SERPL-CCNC: 10 UNIT/L (ref 5–34)
BASOPHILS # BLD AUTO: 0.02 X10(3)/MCL (ref 0–0.2)
BASOPHILS NFR BLD AUTO: 0.3 %
BILIRUBIN DIRECT+TOT PNL SERPL-MCNC: 0.4 MG/DL
BUN SERPL-MCNC: 9.1 MG/DL (ref 7–18.7)
CALCIUM SERPL-MCNC: 9.7 MG/DL (ref 8.4–10.2)
CHLORIDE SERPL-SCNC: 104 MMOL/L (ref 98–107)
CO2 SERPL-SCNC: 27 MMOL/L (ref 22–29)
CREAT SERPL-MCNC: 0.71 MG/DL (ref 0.55–1.02)
CRP SERPL-MCNC: <0.4 MG/L
EOSINOPHIL # BLD AUTO: 0.17 X10(3)/MCL (ref 0–0.9)
EOSINOPHIL NFR BLD AUTO: 2.4 %
ERYTHROCYTE [DISTWIDTH] IN BLOOD BY AUTOMATED COUNT: 15.2 % (ref 11.5–17)
ERYTHROCYTE [SEDIMENTATION RATE] IN BLOOD: 3 MM/HR (ref 0–20)
GFR SERPLBLD CREATININE-BSD FMLA CKD-EPI: >60 MLS/MIN/1.73/M2
GLOBULIN SER-MCNC: 3 GM/DL (ref 2.4–3.5)
GLUCOSE SERPL-MCNC: 94 MG/DL (ref 74–100)
HCT VFR BLD AUTO: 39.5 % (ref 37–47)
HGB BLD-MCNC: 12.2 GM/DL (ref 12–16)
IMM GRANULOCYTES # BLD AUTO: 0.03 X10(3)/MCL (ref 0–0.04)
IMM GRANULOCYTES NFR BLD AUTO: 0.4 %
LYMPHOCYTES # BLD AUTO: 2.05 X10(3)/MCL (ref 0.6–4.6)
LYMPHOCYTES NFR BLD AUTO: 29.2 %
MCH RBC QN AUTO: 27.4 PG (ref 27–31)
MCHC RBC AUTO-ENTMCNC: 30.9 MG/DL (ref 33–36)
MCV RBC AUTO: 88.6 FL (ref 80–94)
MONOCYTES # BLD AUTO: 0.51 X10(3)/MCL (ref 0.1–1.3)
MONOCYTES NFR BLD AUTO: 7.3 %
NEUTROPHILS # BLD AUTO: 4.2 X10(3)/MCL (ref 2.1–9.2)
NEUTROPHILS NFR BLD AUTO: 60.4 %
NRBC BLD AUTO-RTO: 0 %
PLATELET # BLD AUTO: 209 X10(3)/MCL (ref 130–400)
PMV BLD AUTO: 12.6 FL (ref 7.4–10.4)
POTASSIUM SERPL-SCNC: 4.1 MMOL/L (ref 3.5–5.1)
PROT SERPL-MCNC: 7.6 GM/DL (ref 6.4–8.3)
RBC # BLD AUTO: 4.46 X10(6)/MCL (ref 4.2–5.4)
SODIUM SERPL-SCNC: 138 MMOL/L (ref 136–145)
WBC # SPEC AUTO: 7 X10(3)/MCL (ref 4.5–11.5)

## 2022-08-30 PROCEDURE — 85025 COMPLETE CBC W/AUTO DIFF WBC: CPT

## 2022-08-30 PROCEDURE — 85651 RBC SED RATE NONAUTOMATED: CPT

## 2022-08-30 PROCEDURE — 36415 COLL VENOUS BLD VENIPUNCTURE: CPT

## 2022-08-30 PROCEDURE — 80053 COMPREHEN METABOLIC PANEL: CPT

## 2022-08-30 PROCEDURE — 86140 C-REACTIVE PROTEIN: CPT

## 2022-09-01 ENCOUNTER — HOSPITAL ENCOUNTER (EMERGENCY)
Facility: HOSPITAL | Age: 27
Discharge: HOME | End: 2022-09-01
Attending: EMERGENCY MEDICINE
Payer: MEDICAID

## 2022-09-01 VITALS
DIASTOLIC BLOOD PRESSURE: 61 MMHG | HEIGHT: 60 IN | RESPIRATION RATE: 16 BRPM | SYSTOLIC BLOOD PRESSURE: 106 MMHG | HEART RATE: 95 BPM | TEMPERATURE: 98 F | BODY MASS INDEX: 21.44 KG/M2 | OXYGEN SATURATION: 100 %

## 2022-09-01 DIAGNOSIS — R10.84 GENERALIZED ABDOMINAL PAIN: Primary | ICD-10-CM

## 2022-09-01 DIAGNOSIS — N73.9 PELVIC INFLAMMATORY DISEASE (PID): ICD-10-CM

## 2022-09-01 DIAGNOSIS — R10.9 RIGHT FLANK PAIN: ICD-10-CM

## 2022-09-01 DIAGNOSIS — R00.0 RAPID HEART BEAT: ICD-10-CM

## 2022-09-01 DIAGNOSIS — Z87.42 HX OF OVARIAN CYST: ICD-10-CM

## 2022-09-01 LAB
APPEARANCE UR: ABNORMAL
B-HCG UR QL: NEGATIVE
BACTERIA #/AREA URNS AUTO: ABNORMAL /HPF
BILIRUB UR QL STRIP.AUTO: NEGATIVE MG/DL
C TRACH DNA SPEC QL NAA+PROBE: NOT DETECTED
CAOX CRY URNS QL MICRO: ABNORMAL /HPF
CLUE CELLS VAG QL WET PREP: ABNORMAL
COLOR UR AUTO: YELLOW
CTP QC/QA: YES
GLUCOSE UR QL STRIP.AUTO: NORMAL MG/DL
HYALINE CASTS #/AREA URNS LPF: ABNORMAL /LPF
KETONES UR QL STRIP.AUTO: NEGATIVE MG/DL
LEUKOCYTE ESTERASE UR QL STRIP.AUTO: 75 UNIT/L
MUCOUS THREADS URNS QL MICRO: ABNORMAL /LPF
N GONORRHOEA DNA SPEC QL NAA+PROBE: NOT DETECTED
NITRITE UR QL STRIP.AUTO: NEGATIVE
PH UR STRIP.AUTO: 6.5 [PH]
PROT UR QL STRIP.AUTO: ABNORMAL MG/DL
RBC #/AREA URNS AUTO: ABNORMAL /HPF
RBC UR QL AUTO: NEGATIVE UNIT/L
SP GR UR STRIP.AUTO: 1.03
SQUAMOUS #/AREA URNS LPF: ABNORMAL /HPF
T VAGINALIS VAG QL WET PREP: ABNORMAL
UROBILINOGEN UR STRIP-ACNC: NORMAL MG/DL
WBC #/AREA URNS AUTO: ABNORMAL /HPF
WBC #/AREA VAG WET PREP: ABNORMAL
YEAST SPEC QL WET PREP: ABNORMAL

## 2022-09-01 PROCEDURE — 99285 EMERGENCY DEPT VISIT HI MDM: CPT | Mod: 25

## 2022-09-01 PROCEDURE — 25000003 PHARM REV CODE 250

## 2022-09-01 PROCEDURE — 93005 ELECTROCARDIOGRAM TRACING: CPT

## 2022-09-01 PROCEDURE — 25000003 PHARM REV CODE 250: Performed by: EMERGENCY MEDICINE

## 2022-09-01 PROCEDURE — 25500020 PHARM REV CODE 255: Performed by: EMERGENCY MEDICINE

## 2022-09-01 PROCEDURE — 96360 HYDRATION IV INFUSION INIT: CPT

## 2022-09-01 PROCEDURE — 96372 THER/PROPH/DIAG INJ SC/IM: CPT | Mod: 59 | Performed by: EMERGENCY MEDICINE

## 2022-09-01 PROCEDURE — 87210 SMEAR WET MOUNT SALINE/INK: CPT

## 2022-09-01 PROCEDURE — 87088 URINE BACTERIA CULTURE: CPT

## 2022-09-01 PROCEDURE — 87491 CHLMYD TRACH DNA AMP PROBE: CPT

## 2022-09-01 PROCEDURE — 86403 PARTICLE AGGLUT ANTBDY SCRN: CPT | Performed by: EMERGENCY MEDICINE

## 2022-09-01 PROCEDURE — 63600175 PHARM REV CODE 636 W HCPCS: Performed by: EMERGENCY MEDICINE

## 2022-09-01 PROCEDURE — 87591 N.GONORRHOEAE DNA AMP PROB: CPT

## 2022-09-01 PROCEDURE — 81001 URINALYSIS AUTO W/SCOPE: CPT

## 2022-09-01 PROCEDURE — 81025 URINE PREGNANCY TEST: CPT

## 2022-09-01 RX ORDER — LIDOCAINE HYDROCHLORIDE 10 MG/ML
2 INJECTION, SOLUTION EPIDURAL; INFILTRATION; INTRACAUDAL; PERINEURAL ONCE
Qty: 2 ML | Refills: 0 | Status: SHIPPED | OUTPATIENT
Start: 2022-09-01 | End: 2022-09-01 | Stop reason: SDUPTHER

## 2022-09-01 RX ORDER — CEFTRIAXONE 500 MG/1
500 INJECTION, POWDER, FOR SOLUTION INTRAMUSCULAR; INTRAVENOUS
Status: COMPLETED | OUTPATIENT
Start: 2022-09-01 | End: 2022-09-01

## 2022-09-01 RX ORDER — LIDOCAINE HYDROCHLORIDE 10 MG/ML
2 INJECTION, SOLUTION EPIDURAL; INFILTRATION; INTRACAUDAL; PERINEURAL ONCE
Status: COMPLETED | OUTPATIENT
Start: 2022-09-01 | End: 2022-09-01

## 2022-09-01 RX ORDER — SODIUM CHLORIDE 9 MG/ML
1000 INJECTION, SOLUTION INTRAVENOUS ONCE
Status: COMPLETED | OUTPATIENT
Start: 2022-09-01 | End: 2022-09-01

## 2022-09-01 RX ORDER — DOXYCYCLINE 100 MG/1
100 CAPSULE ORAL 2 TIMES DAILY
Qty: 28 CAPSULE | Refills: 0 | Status: SHIPPED | OUTPATIENT
Start: 2022-09-01 | End: 2022-09-15

## 2022-09-01 RX ORDER — METRONIDAZOLE 500 MG/1
500 TABLET ORAL EVERY 12 HOURS
Qty: 28 TABLET | Refills: 0 | Status: SHIPPED | OUTPATIENT
Start: 2022-09-01 | End: 2022-09-15

## 2022-09-01 RX ADMIN — LIDOCAINE HYDROCHLORIDE 20 MG: 10 INJECTION, SOLUTION EPIDURAL; INFILTRATION; INTRACAUDAL; PERINEURAL at 06:09

## 2022-09-01 RX ADMIN — SODIUM CHLORIDE 1000 ML: 9 INJECTION, SOLUTION INTRAVENOUS at 03:09

## 2022-09-01 RX ADMIN — IOPAMIDOL 100 ML: 755 INJECTION, SOLUTION INTRAVENOUS at 05:09

## 2022-09-01 RX ADMIN — CEFTRIAXONE SODIUM 500 MG: 500 INJECTION, POWDER, FOR SOLUTION INTRAMUSCULAR; INTRAVENOUS at 06:09

## 2022-09-01 NOTE — ED PROVIDER NOTES
Encounter Date: 9/1/2022       History     Chief Complaint   Patient presents with    Abdominal Pain    Dizziness     PT W CO CONTINUED ABD PAIN W NVD  FOR MONTHS, WORSE X 5 DAYS, STATES PASSING MUCUS. UNABLE TO HOLD ANYTHING DOWN W WT LOSS.    CO WEAKNESS W PAL. . ACTIVELY VOMITING AND CRYING IN TRIAGE.  EKG OBTAINED.     Vomiting     Royce Quigley is a 28 yo white female w/ a PMHx of Graves disease and recurrent UTIs who presents to the ED today w/ her mother for severe abdominal pain w/ vomiting and diarrhea. Severe, burning abdominal pain R>L + R sided flank pain started 4-5 days ago, feels this is significantly different then her baseline abdominal pain. Does have urinary urgency but denies decreased urination, pain w/ urination or gross blood in urine. Denies hx of kidney stones. Her pain is also accompanied by vomiting and diarrhea, states that she has been unable to keep fluids and food down. Diarrhea has a foul smell and mostly mucus, no gross blood or melena has been observed. Endorses lightheadedness but, denies loss of consciousness.      Pt endorses being sexually active without barrier contraception. Has had hx of STI and PID. Denies pain w/ intercourse, genital swellings, and abnormal discharge.     Pt is currently taking Doxycycline for a dental infection as well as her regularly prescribed medications including Xanax, Cymbalta, Bentyl, Pepcid, Protonix, Zofran, Tapazole, Inderal, and Singular. States she has been on several different antibiotics for the last month due to recurrent UTIs. Known allergy to diphenhydramine.     Per patient and Epic chart review, since last ED visit, pt has been trying to get worked up by GI for colonoscopy but, cannot be seen for needed workup for at least 1 more month. Was able to be seen by endocrinology recently. Has never been worked up by rheumatology for autoimmune diseases. Per patient and Epic chart review, CT Ab/Pelvis w/ contrast done on 11/30/22 showed  hemoperitoneum consistent w/ ruptured hemorrhagic ovarian cyst, right sided.     Fhx of pancreatic cancer from aunt, chronic pancreatitis from grandmother.     4:55 PM  Pt was not able to leave testable stool sample for lab. Pelvic exam complete, see physical exam for details. Pt waiting for imaging.       Review of patient's allergies indicates:   Allergen Reactions    Diphenhydramine Anxiety and Other (See Comments)    Diphenhydramine hcl Anxiety     Other reaction(s): felt funny     Past Medical History:   Diagnosis Date    Graves disease      Past Surgical History:   Procedure Laterality Date    TONSILLECTOMY       No family history on file.  Social History     Tobacco Use    Smoking status: Every Day     Packs/day: 1.00     Types: Cigarettes    Smokeless tobacco: Never   Substance Use Topics    Alcohol use: Never    Drug use: Not Currently     Review of Systems   Constitutional:  Positive for fatigue and unexpected weight change. Negative for activity change, appetite change, chills, diaphoresis and fever.   HENT:  Positive for dental problem. Negative for drooling, facial swelling, mouth sores, sinus pressure, sinus pain, sore throat, trouble swallowing and voice change.    Eyes:  Negative for photophobia and visual disturbance.   Respiratory:  Negative for apnea, cough, chest tightness, shortness of breath, wheezing and stridor.    Cardiovascular:  Negative for chest pain and palpitations.   Gastrointestinal:  Positive for abdominal pain, diarrhea, nausea, rectal pain and vomiting. Negative for abdominal distention, anal bleeding, blood in stool and constipation.   Endocrine: Negative for cold intolerance, heat intolerance, polydipsia and polyuria.   Genitourinary:  Positive for flank pain, frequency, pelvic pain and urgency. Negative for decreased urine volume, difficulty urinating, dyspareunia, dysuria, genital sores, hematuria, menstrual problem, vaginal bleeding, vaginal discharge and vaginal pain.    Musculoskeletal:  Negative for arthralgias, joint swelling, myalgias, neck pain and neck stiffness.   Skin:  Negative for pallor and rash.   Allergic/Immunologic: Negative for food allergies.   Neurological:  Positive for weakness and light-headedness. Negative for dizziness, tremors, seizures, syncope, facial asymmetry, speech difficulty, numbness and headaches.   Hematological:  Negative for adenopathy.   Psychiatric/Behavioral:  Negative for agitation, behavioral problems, confusion, dysphoric mood, hallucinations, self-injury, sleep disturbance and suicidal ideas. The patient is not nervous/anxious and is not hyperactive.      Physical Exam     Initial Vitals [09/01/22 1326]   BP Pulse Resp Temp SpO2   94/66 (!) 132 16 97.9 °F (36.6 °C) 98 %      MAP       --         Physical Exam    Vitals reviewed.  Constitutional: She is not diaphoretic. She is cooperative. She is easily aroused.  Non-toxic appearance. She has a sickly appearance. She does not appear ill. She appears distressed.   Eyes: Conjunctivae and EOM are normal. Pupils are equal, round, and reactive to light. Right eye exhibits no discharge. Left eye exhibits no discharge. No scleral icterus.   Neck: No JVD present.   Normal range of motion.  Cardiovascular:  Regular rhythm, S1 normal, S2 normal, normal heart sounds and intact distal pulses.   Tachycardia present.         No murmur heard.  Pulmonary/Chest: Effort normal and breath sounds normal. No accessory muscle usage or stridor. No apnea. No respiratory distress. She has no decreased breath sounds. She has no wheezes. She has no rhonchi. She has no rales. She exhibits no tenderness.   Abdominal: Abdomen is flat. There is generalized abdominal tenderness and tenderness in the right upper quadrant and suprapubic area.   Genitourinary:    Rectum normal.   Rectum:      No external hemorrhoid.      Pelvic exam was performed with patient supine.   There is no rash, tenderness or lesion on the right  labia. There is rash and lesion on the left labia. There is no tenderness on the left labia. Cervix exhibits motion tenderness and discharge. Cervix exhibits no friability. Right adnexum displays no mass. Left adnexum displays no mass.    Vaginal tenderness present.      No vaginal discharge, erythema or bleeding.   There is tenderness in the vagina. No erythema or bleeding in the vagina.    No foreign body in the vagina.      No signs of injury in the vagina.      Genitourinary Comments: Pelvic Exam:   Conducted by: Ankur Loyola MD  Chaperoned by: Ivis Mcduffie MD    External observation: no abnormalities. No open wounds, erythema, swelling, adenopathy, labial cysts/abscesses. No superficial tenderness.     Internal observation and exam: Extremely tender to speculum examination. No obvious vaginal swelling or redundant tissue. +Cervical motion tenderness with abundant thin, white discharge at cervical os. Discharge was not foul smelling. No friability or blood.       Musculoskeletal:         General: No tenderness. Normal range of motion.      Cervical back: Normal range of motion.     Lymphadenopathy:     She has no cervical adenopathy. No inguinal adenopathy noted on the right or left side.   Neurological: She is alert, oriented to person, place, and time and easily aroused. She displays no atrophy and no tremor. No cranial nerve deficit or sensory deficit. She displays no seizure activity.   Presenting in a wheelchair due to severe weakness and lightheadedness    Skin: Skin is warm and dry. Capillary refill takes less than 2 seconds. No abrasion, no bruising, no burn, no ecchymosis, no rash and no abscess noted. No cyanosis or erythema. Nails show no clubbing.   Psychiatric: Judgment normal. Her affect is labile. She is not actively hallucinating. Thought content is not paranoid and not delusional. Cognition and memory are not impaired. She does not express impulsivity or inappropriate judgment. She  expresses no homicidal and no suicidal ideation. She expresses no suicidal plans and no homicidal plans. She exhibits normal recent memory and normal remote memory.   Pt labile, tearful. Requesting to be admitted for gall bladder removal.  She is attentive.       ED Course   Procedures  Labs Reviewed   WET PREP, GENITAL - Abnormal; Notable for the following components:       Result Value    WBC, Wet Prep Rare (*)     All other components within normal limits   URINALYSIS, REFLEX TO URINE CULTURE - Abnormal; Notable for the following components:    Appearance, UA Turbid (*)     Protein, UA Trace (*)     WBC, UA 11-20 (*)     Bacteria, UA Occ (*)     Squamous Epithelial Cells, UA Many (*)     Mucous, UA Few (*)     Calcium Oxalate Crystals, UA Moderate (*)     All other components within normal limits   CLOSTRIDIUM DIFFICILE TOXIN A AND B, EIA   STOOL CULTURE OLG   CHLAMYDIA/GONORRHOEAE(GC), PCR   CULTURE, URINE   FECAL LEUKOCYTES - LACTOFERRIN ON  STOOL   POCT URINE PREGNANCY     EKG Readings: (Independently Interpreted)   Initial Reading: No STEMI. Rhythm: Sinus Tachycardia. Heart Rate: 114. Ectopy: No Ectopy. Conduction: Normal. ST Segments: Normal ST Segments. T Waves: Normal. Axis: Normal. Clinical Impression: Sinus Tachycardia   Lead II: P wave amplitude suggests Right Atrial Enlargement.  Benign.    ECG Results              EKG 12-lead (Rapid Heart Beat / Palpitations) Age > 50 (In process)  Result time 09/01/22 13:39:37      In process by Interface, Lab In [facility] (09/01/22 13:39:37)                   Narrative:    Test Reason : R00.0,    Vent. Rate : 118 BPM     Atrial Rate : 118 BPM     P-R Int : 146 ms          QRS Dur : 078 ms      QT Int : 318 ms       P-R-T Axes : 080 084 057 degrees     QTc Int : 445 ms    Sinus tachycardia  Right atrial enlargement  Borderline Abnormal ECG  No previous ECGs available    Referred By:             Confirmed By:                                   Imaging Results               CT Abdomen Pelvis With Contrast (Final result)  Result time 09/01/22 17:32:21      Final result by Leela Mehta MD (09/01/22 17:32:21)                   Impression:      No appreciable acute intra-abdominal abnormality.      Electronically signed by: Leela Mehta  Date:    09/01/2022  Time:    17:32               Narrative:    EXAMINATION:  CT ABDOMEN PELVIS WITH CONTRAST    CLINICAL HISTORY:  Nausea/vomiting;UTI, recurrent/complicated (Female);Abdominal pain, acute, nonlocalized;Hx of  hemorrhagic cyst, R adenexa and  hemoperitoneum;    TECHNIQUE:  Helically acquired images with axial, sagittal and coronal reformations were obtained from the lung bases to the pubic symphysis after the IV administration of contrast.    Automated tube current modulation, weight-based exposure dosing, and/or iterative reconstruction technique utilized to reach lowest reasonably achievable exposure rate.    DLP: 209 mGy*cm    COMPARISON:  CT abdomen pelvis 08/01/2022    FINDINGS:  HEART: Normal in size. No pericardial effusion.    LUNG BASES: Well aerated.    LIVER: Normal attenuation. No appreciable focal hepatic lesion.    BILIARY: No calcified gallstones.    PANCREAS: No inflammatory change.    SPLEEN: Normal in size    ADRENALS: No mass.    KIDNEYS/URETERS: The kidneys enhance symmetrically.  No hydronephrosis.    GI TRACT/MESENTERY:  No evidence of bowel obstruction or inflammation. The appendix is normal. Normal CT appearance of the colon.    PERITONEUM: No free fluid.No free air.    LYMPH NODES: No enlarged lymph nodes by size criteria.    VASCULATURE: No significant atherosclerosis or aneurysm.    BLADDER: Nondistended bladder limits CT evaluation    REPRODUCTIVE ORGANS: The uterus is retroverted.  There is trace fluid in the endometrial cavity, nonspecific in a menstrual age female..  Dominant right ovarian follicle measures 2 cm.    SOFT TISSUES: Unremarkable.    BONES: No acute osseous abnormality.                                     X-Rays:   Independently Interpreted Readings:   Abdomen: No acute abnormal   Medications   cefTRIAXone injection 500 mg (has no administration in time range)   0.9%  NaCl infusion (0 mLs Intravenous Stopped 9/1/22 1637)   iopamidoL (ISOVUE-370) injection 100 mL (100 mLs Intravenous Given 9/1/22 1722)     Medical Decision Making:   Initial Assessment:   Features found most compatible with pelvic inflammatory disease on the basis of history and physical exam.  Risk nevertheless found sufficient to warrant expanded evaluation with objective data.  Differential Diagnosis:   Pelvic inflammatory disease, urinary tract infection, appendicitis, pyelonephritis  Clinical Tests:   Lab Tests: Ordered and Reviewed  Radiological Study: Ordered and Reviewed  ED Management:  Clinical course in the emergency department is found reassuring.  Objective data resulted in found compatible with pelvic inflammatory disease, without evidence suggestive of severe course.  Plan treat on an outpatient basis, with prescriptions, return precautions, follow-up instructions.           ED Course as of 09/01/22 1756   Thu Sep 01, 2022   1619 Ca Oxalate [facility], UA(!): Moderate [HS]      ED Course User Index  [HS] Ivis Mcduffie MD               09/01/2022 5:18 PM     I have seen this patient and performed an independent face to face history and physical examination, and I agree with all evaluation and management as documented by Ivis Mcduffie MD.    27 y.o. female presents with complex past history including Graves disease, chronic abdominal pain, ruptured ovarian cyst, prior STD and PID, others as listed.  Presents with persistent abdominal pain in the setting of diarrhea and some vaginal discharge, right greater than left flank pain, some vomiting and decreased oral intake.  Exam as outlined is consistent with volume depletion, mild pelvic inflammatory disease, and further data are pending to include additional  laboratory studies and CT of the abdomen pelvis with IV contrast for further evaluation.  We are anticipating an outpatient approach to management and have placed referrals in the system for Gastroenterology follow-up and general surgery follow-up.  She does have a history gallbladder dysfunction with a reported HIDA scan ejection fraction of 20% in the past and she would like to further pursue her GI issues both with Gastroenterology and gallbladder issues with General surgery.  Dr. Newell assuming care at this time.    Dany Thorpe MD     Clinical Impression:   Final diagnoses:  [R00.0] Rapid heart beat  [R10.84] Generalized abdominal pain (Primary)  [R10.9] Right flank pain  [Z87.42] Hx of ovarian cyst  [N73.9] Pelvic inflammatory disease (PID)        ED Disposition Condition    Discharge Stable          ED Prescriptions       Medication Sig Dispense Start Date End Date Auth. Provider    doxycycline (VIBRAMYCIN) 100 MG Cap Take 1 capsule (100 mg total) by mouth 2 (two) times daily. for 14 days 28 capsule 9/1/2022 9/15/2022 Darshan Newell MD    metroNIDAZOLE (FLAGYL) 500 MG tablet Take 1 tablet (500 mg total) by mouth every 12 (twelve) hours. for 14 days 28 tablet 9/1/2022 9/15/2022 Darshan Newell MD          Follow-up Information       Follow up With Specialties Details Why Contact Moreno Biggs NP Family Medicine Schedule an appointment as soon as possible for a visit in 1 week  [address] [place] [address] DR SRINI QUIROZ [id number]  [phone]      [facility] Emergency Medicine  If symptoms worsen [address] [facility] [id number] [phone]    [facility] Emergency Medicine  As needed, If symptoms worsen [address] [facility] [id number] [phone]             Darshan Newell MD  09/01/22 0379

## 2022-09-02 LAB
CLOSTRIDIUM DIFFICILE GDH ANTIGEN (OHS): NEGATIVE
CLOSTRIDIUM DIFFICILE TOXIN A/B (OHS): NEGATIVE
FECAL LEUKOCYTE (OHS): NEGATIVE
LEUKO NEG CONT (OHS): NEGATIVE
LEUKO POS CONT (OHS): POSITIVE

## 2022-09-02 PROCEDURE — 87045 FECES CULTURE AEROBIC BACT: CPT | Performed by: EMERGENCY MEDICINE

## 2022-09-02 PROCEDURE — 87077 CULTURE AEROBIC IDENTIFY: CPT | Performed by: EMERGENCY MEDICINE

## 2022-09-02 PROCEDURE — 86318 IA INFECTIOUS AGENT ANTIBODY: CPT | Performed by: EMERGENCY MEDICINE

## 2022-09-04 LAB — BACTERIA UR CULT: NORMAL

## 2022-09-05 LAB — BACTERIA STL CULT: NORMAL

## 2022-09-06 ENCOUNTER — PATIENT OUTREACH (OUTPATIENT)
Dept: EMERGENCY MEDICINE | Facility: HOSPITAL | Age: 27
End: 2022-09-06
Payer: MEDICAID

## 2022-09-06 NOTE — NURSING
Referrals modified and re-submitted to [facility] General Surgery and GI, patient informed

## 2022-09-22 ENCOUNTER — HISTORICAL (OUTPATIENT)
Dept: ADMINISTRATIVE | Facility: HOSPITAL | Age: 27
End: 2022-09-22
Payer: MEDICAID

## 2022-09-26 ENCOUNTER — PATIENT OUTREACH (OUTPATIENT)
Dept: EMERGENCY MEDICINE | Facility: HOSPITAL | Age: 27
End: 2022-09-26
Payer: MEDICAID

## 2022-09-26 NOTE — PROGRESS NOTES
Remind pt of appt for tomorrow 9/27/22 at 9:30 am with [facility] surgery clinic, stressed importance of attending appt. Pt voices understanding to instructions given and appreciation.

## 2022-09-27 ENCOUNTER — OFFICE VISIT (OUTPATIENT)
Dept: SURGERY | Facility: CLINIC | Age: 27
End: 2022-09-27
Payer: MEDICAID

## 2022-09-27 VITALS
BODY MASS INDEX: 20.86 KG/M2 | HEART RATE: 97 BPM | WEIGHT: 106.25 LBS | TEMPERATURE: 99 F | HEIGHT: 60 IN | SYSTOLIC BLOOD PRESSURE: 114 MMHG | DIASTOLIC BLOOD PRESSURE: 78 MMHG | RESPIRATION RATE: 20 BRPM | OXYGEN SATURATION: 99 %

## 2022-09-27 DIAGNOSIS — K82.8 BILIARY DYSKINESIA: Primary | ICD-10-CM

## 2022-09-27 DIAGNOSIS — R10.84 GENERALIZED ABDOMINAL PAIN: ICD-10-CM

## 2022-09-27 PROCEDURE — 99215 OFFICE O/P EST HI 40 MIN: CPT | Mod: PBBFAC

## 2022-09-27 RX ORDER — HEPARIN SODIUM 5000 [USP'U]/ML
5000 INJECTION, SOLUTION INTRAVENOUS; SUBCUTANEOUS
Status: CANCELLED | OUTPATIENT
Start: 2022-09-27 | End: 2022-09-28

## 2022-09-27 RX ORDER — SODIUM CHLORIDE 9 MG/ML
INJECTION, SOLUTION INTRAVENOUS CONTINUOUS
Status: CANCELLED | OUTPATIENT
Start: 2022-09-27

## 2022-09-27 NOTE — PROGRESS NOTES
Placed in room. Seen by med student, Светлана and Dr. Liz.Spoke with patient. Surgery date is 10/10/2022.

## 2022-09-27 NOTE — H&P (VIEW-ONLY)
"Chief complaint:  had concerns including gallbladder disease.     HPI:   Royce Quigley is a 27 y.o. female with PMHx significant for chronic abdominal pain for past 2 years and recurrent UTIs. Reports diffuse progressive LUQ pain that was initially episodic (2x/week) and progressed to constant LUQ pain. Pain was described as crampy, spasm pain that forces her to bend over for relief. Pressure from LUQ abdominal pain often results in patient with moderate retching. Retch was described as "sometimes green, brown, white foamy" and occurs constantly throughout the day. Pain is not aggravated by eating, the pain is aggravated by smoking and caffeine. Patient reports frequent diarrhea. Patient denies fever, chills, or nausea. Patient reports weight loss from 120lb to current 106 lb in the previous year.    Patient has had extensive GI work up to try to determine etiology of symptoms. Upper GI series in 04/2022 and EGD in 05/2022 were negative for any pathology. Multiple CT scans from ED visits have also been negative. She reports getting a HIDA scan that showed an EF of 20%.    ROS:  Negative except above      PMHx:  has a past medical history of Graves disease.  PSHx:  has a past surgical history that includes Tonsillectomy. Diagnostic Laparoscopy for ruptured ovarian cyst  FamHx: Family history of Myocardial infarction in paternal side in father, grandfather, and grandmother. Maternal history of ovarian cancer in mother  SocHx:  reports that she has been smoking cigarettes. She has been smoking an average of 1 pack per day. She has never been exposed to tobacco smoke. She has never used smokeless tobacco. She reports that she does not currently use drugs. She reports that she does not drink alcohol.    Physical Exam:  Vitals: Blood pressure 114/78, pulse 97, temperature 98.8 °F (37.1 °C), temperature source Oral, resp. rate 20, height 4' 11.84" (1.52 m), weight 48.2 kg (106 lb 4.2 oz), last menstrual period " 09/21/2022, SpO2 99 %.  General: awake, alert, cooperative, in no acute distress. Pt oriented x3.  Mood/affect normal.   Chest: RRR. Normal S1 S2. No murmurs appreciated.   Lungs: CTAB. no wheezes, rhonchi, or crackles. No retractions or nasal flaring  Abd: soft, non-distended, mild epigastric tenderness to palpation without guarding. No hepatosplenomegaly, negative Painter's sign  Extremities:  MAEW, good muscle tone and strength  Skin: no rash or edema noted    Imaging:  CT Abdomen Pelvis w/ contrast 09/01/2022  -Impression: No appreciable acute intra-abdominal abnormality.    FL Upper GI 04/08/2022  -IMPRESSION  Unremarkable upper GI examination.    Assessment and Plan:  Patient is a 28 yo female with chronic abdominal pain who presents for evaluation for cholecystectomy.    -Extensive discussion was had with the patient regarding the possible etiology of her symptoms. She has had extensive work up including multiple imaging studies and EGD that have all been negative. Given her negative work up and HIDA scan showing biliary dyskinesia, it was decided to pursue cholecystectomy with the understanding that her symptoms may not completely resolved with surgery.   -Obtain HIDA scan results from [facility]  -Scheduled for Cholecystectomy on Oct 14, 2022  -Informed consent obtained in clinic     Patient seen and examined with Student Doctor Светлана. Agree with assessment and plan as documented. Above note reviewed and edited as necessary.    Concepcion Liz MD  [facility] General Surgery, PGY-3

## 2022-09-27 NOTE — PROGRESS NOTES
"Chief complaint:  had concerns including gallbladder disease.     HPI:   Royce Quigley is a 27 y.o. female with PMHx significant for chronic abdominal pain for past 2 years and recurrent UTIs. Reports diffuse progressive LUQ pain that was initially episodic (2x/week) and progressed to constant LUQ pain. Pain was described as crampy, spasm pain that forces her to bend over for relief. Pressure from LUQ abdominal pain often results in patient with moderate retching. Retch was described as "sometimes green, brown, white foamy" and occurs constantly throughout the day. Pain is not aggravated by eating, the pain is aggravated by smoking and caffeine. Patient reports frequent diarrhea. Patient denies fever, chills, or nausea. Patient reports weight loss from 120lb to current 106 lb in the previous year.    Patient has had extensive GI work up to try to determine etiology of symptoms. Upper GI series in 04/2022 and EGD in 05/2022 were negative for any pathology. Multiple CT scans from ED visits have also been negative. She reports getting a HIDA scan that showed an EF of 20%.    ROS:  Negative except above      PMHx:  has a past medical history of Graves disease.  PSHx:  has a past surgical history that includes Tonsillectomy. Diagnostic Laparoscopy for ruptured ovarian cyst  FamHx: Family history of Myocardial infarction in paternal side in father, grandfather, and grandmother. Maternal history of ovarian cancer in mother  SocHx:  reports that she has been smoking cigarettes. She has been smoking an average of 1 pack per day. She has never been exposed to tobacco smoke. She has never used smokeless tobacco. She reports that she does not currently use drugs. She reports that she does not drink alcohol.    Physical Exam:  Vitals: Blood pressure 114/78, pulse 97, temperature 98.8 °F (37.1 °C), temperature source Oral, resp. rate 20, height 4' 11.84" (1.52 m), weight 48.2 kg (106 lb 4.2 oz), last menstrual period " 09/21/2022, SpO2 99 %.  General: awake, alert, cooperative, in no acute distress. Pt oriented x3.  Mood/affect normal.   Chest: RRR. Normal S1 S2. No murmurs appreciated.   Lungs: CTAB. no wheezes, rhonchi, or crackles. No retractions or nasal flaring  Abd: soft, non-distended, mild epigastric tenderness to palpation without guarding. No hepatosplenomegaly, negative Painter's sign  Extremities:  MAEW, good muscle tone and strength  Skin: no rash or edema noted    Imaging:  CT Abdomen Pelvis w/ contrast 09/01/2022  -Impression: No appreciable acute intra-abdominal abnormality.    FL Upper GI 04/08/2022  -IMPRESSION  Unremarkable upper GI examination.    Assessment and Plan:  Patient is a 28 yo female with chronic abdominal pain who presents for evaluation for cholecystectomy.    -Extensive discussion was had with the patient regarding the possible etiology of her symptoms. She has had extensive work up including multiple imaging studies and EGD that have all been negative. Given her negative work up and HIDA scan showing biliary dyskinesia, it was decided to pursue cholecystectomy with the understanding that her symptoms may not completely resolved with surgery.   -Obtain HIDA scan results from [facility]  -Scheduled for Cholecystectomy on Oct 14, 2022  -Informed consent obtained in clinic     Patient seen and examined with Student Doctor Светлана. Agree with assessment and plan as documented. Above note reviewed and edited as necessary.    Concepcion Liz MD  [facility] General Surgery, PGY-3

## 2022-09-28 ENCOUNTER — ANESTHESIA EVENT (OUTPATIENT)
Dept: SURGERY | Facility: HOSPITAL | Age: 27
End: 2022-09-28
Payer: MEDICAID

## 2022-09-28 NOTE — ANESTHESIA PREPROCEDURE EVALUATION
"                                                                                                             09/28/2022  Royce Quigley is a 27 y.o., female. For Lap madi, history of Graves dz & hyperthyroidism, GERD, Smoker      COVID STATUS: TEST DOS  BETA-BLOCKER: PROPRANOLOL LD @ 10.9.22 2200    PAT NURSE PHONE INTERVIEW 10/6/22    PROBLEM LIST:  -  BILIARY DYSKINESIA, CHRONIC ABDOMINAL PAIN  -  UPT STATUS -- NEG DOS   -  GRAVE's DISEASE, HYPERTHYROIDISM, MNG, THYROMEGALY - 7/19/22 TFT's WNL  -  PALPITATIONS. 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 NO DVT   -  VENOUS INSUFFICIENCY  -  SMOKER 14 PPY; LLL CALCIFIED NODULE on CT    AM Rx DOS:  BENTYL PRN, PROTONIX, ZOFRAN PRN, CYMBALTA (PROPRANOLOL & METHIMZAOLE @ HS)    ORDERS -   SURGEON: 3/27/22 CXR; 7/19/22 TFT's; 8/30/22 CBC, CMP; 9/1/22 EKG;   ANESTHESIA: UPT  CARDs (CIS) - DR. PALACIOS 5/16/22 OV "LOW RISK for EGD" (INCL. REFERENCE to 3/4/22 CT ABD., 3/4/22 HOLTER); 4/14/22 ECHO-EF 60%, RVSP 19.9mmHg, MILD VHD; 3/22/22 EKG (IN MEDIA)      Pre-op Assessment    I have reviewed the Patient Summary Reports.     I have reviewed the Nursing Notes. I have reviewed the NPO Status.   I have reviewed the Medications.     Review of Systems  Anesthesia Hx:  No problems with previous Anesthesia  History of prior surgery of interest to airway management or planning: Denies Family Hx of Anesthesia complications.   Denies Personal Hx of Anesthesia complications.   Hematology/Oncology:  Hematology Normal   Oncology Normal     EENT/Dental:EENT/Dental Normal   Cardiovascular:  Cardiovascular Normal     Pulmonary:  Pulmonary Normal    Renal/:  Renal/ Normal     Hepatic/GI:  Hepatic/GI Normal    Musculoskeletal:  Musculoskeletal Normal    Neurological:  Neurology Normal    Endocrine:  Endocrine Normal "    Dermatological:  Skin Normal    Psych:   anxiety          Physical Exam  General: Well nourished, Cooperative, Alert and Oriented    Airway:  Mallampati: I / I  Mouth Opening: Normal  TM Distance: Normal  Tongue: Normal  Neck ROM: Normal ROM    Dental:  Intact        Anesthesia Plan  Type of Anesthesia, risks & benefits discussed:    Anesthesia Type: Gen ETT  Intra-op Monitoring Plan: Standard ASA Monitors  Post Op Pain Control Plan: multimodal analgesia and IV/PO Opioids PRN  Induction:  IV  Airway Plan: Direct  Informed Consent: Informed consent signed with the Patient and all parties understand the risks and agree with anesthesia plan.  All questions answered. Patient consented to blood products? No  ASA Score: 3  Day of Surgery Review of History & Physical: H&P Update referred to the surgeon/provider.    Ready For Surgery From Anesthesia Perspective.     .    Lab Results   Component Value Date    WBC 7.0 08/30/2022    HGB 12.2 08/30/2022    HCT 39.5 08/30/2022    MCV 88.6 08/30/2022     08/30/2022     CMP  Sodium Level   Date Value Ref Range Status   08/30/2022 138 136 - 145 mmol/L Final     Potassium Level   Date Value Ref Range Status   08/30/2022 4.1 3.5 - 5.1 mmol/L Final     Carbon Dioxide   Date Value Ref Range Status   08/30/2022 27 22 - 29 mmol/L Final     Blood Urea Nitrogen   Date Value Ref Range Status   08/30/2022 9.1 7.0 - 18.7 mg/dL Final     Creatinine   Date Value Ref Range Status   08/30/2022 0.71 0.55 - 1.02 mg/dL Final     Calcium Level Total   Date Value Ref Range Status   08/30/2022 9.7 8.4 - 10.2 mg/dL Final     Albumin Level   Date Value Ref Range Status   08/30/2022 4.6 3.5 - 5.0 gm/dL Final     Bilirubin Total   Date Value Ref Range Status   08/30/2022 0.4 <=1.5 mg/dL Final     Alkaline Phosphatase   Date Value Ref Range Status   08/30/2022 52 40 - 150 unit/L Final     Aspartate Aminotransferase   Date Value Ref Range Status   08/30/2022 10 5 - 34 unit/L Final     Alanine  Aminotransferase   Date Value Ref Range Status   08/30/2022 8 0 - 55 unit/L Final     Estimated GFR-Non    Date Value Ref Range Status   08/01/2022 >60 mls/min/1.73/m2 Final     Lab Results   Component Value Date    TSH 2.0166 07/19/2022       12/3/21 MICHELLE      9/23/21 ECHO        9/23/21 HOLTER       3/27/22 CXR  Findings:  Frontal and lateral views of the chest were obtained. Heart and  mediastinum within normal limits. The lungs are clear. No pneumothorax  or significant effusion.     Impression:   No acute cardiopulmonary abnormality.

## 2022-10-06 RX ORDER — CLINDAMYCIN HYDROCHLORIDE 300 MG/1
300 CAPSULE ORAL
COMMUNITY
End: 2023-01-19

## 2022-10-09 ENCOUNTER — PATIENT MESSAGE (OUTPATIENT)
Dept: ADMINISTRATIVE | Facility: OTHER | Age: 27
End: 2022-10-09
Payer: MEDICAID

## 2022-10-10 ENCOUNTER — ANESTHESIA (OUTPATIENT)
Dept: SURGERY | Facility: HOSPITAL | Age: 27
End: 2022-10-10
Payer: MEDICAID

## 2022-10-10 ENCOUNTER — HOSPITAL ENCOUNTER (OUTPATIENT)
Facility: HOSPITAL | Age: 27
Discharge: HOME | End: 2022-10-10
Attending: SURGERY | Admitting: SURGERY
Payer: MEDICAID

## 2022-10-10 VITALS
OXYGEN SATURATION: 100 % | RESPIRATION RATE: 15 BRPM | TEMPERATURE: 97 F | HEART RATE: 70 BPM | DIASTOLIC BLOOD PRESSURE: 65 MMHG | SYSTOLIC BLOOD PRESSURE: 108 MMHG

## 2022-10-10 DIAGNOSIS — K82.8 BILIARY DYSKINESIA: ICD-10-CM

## 2022-10-10 LAB
B-HCG UR QL: NEGATIVE
CTP QC/QA: YES
CTP QC/QA: YES
SARS-COV-2 AG RESP QL IA.RAPID: NEGATIVE

## 2022-10-10 PROCEDURE — 36000708 HC OR TIME LEV III 1ST 15 MIN: Performed by: SURGERY

## 2022-10-10 PROCEDURE — 00790 ANES IPER UPR ABD NOS: CPT | Performed by: SURGERY

## 2022-10-10 PROCEDURE — 63600175 PHARM REV CODE 636 W HCPCS: Performed by: SPECIALIST

## 2022-10-10 PROCEDURE — 25000003 PHARM REV CODE 250: Performed by: NURSE ANESTHETIST, CERTIFIED REGISTERED

## 2022-10-10 PROCEDURE — 36000709 HC OR TIME LEV III EA ADD 15 MIN: Performed by: SURGERY

## 2022-10-10 PROCEDURE — 71000033 HC RECOVERY, INTIAL HOUR: Performed by: SURGERY

## 2022-10-10 PROCEDURE — 25000003 PHARM REV CODE 250: Performed by: SPECIALIST

## 2022-10-10 PROCEDURE — 63600175 PHARM REV CODE 636 W HCPCS: Performed by: NURSE ANESTHETIST, CERTIFIED REGISTERED

## 2022-10-10 PROCEDURE — 63600175 PHARM REV CODE 636 W HCPCS: Performed by: STUDENT IN AN ORGANIZED HEALTH CARE EDUCATION/TRAINING PROGRAM

## 2022-10-10 PROCEDURE — 71000016 HC POSTOP RECOV ADDL HR: Performed by: SURGERY

## 2022-10-10 PROCEDURE — 88304 TISSUE EXAM BY PATHOLOGIST: CPT | Performed by: SURGERY

## 2022-10-10 PROCEDURE — 37000008 HC ANESTHESIA 1ST 15 MINUTES: Performed by: SURGERY

## 2022-10-10 PROCEDURE — 25000003 PHARM REV CODE 250: Performed by: SURGERY

## 2022-10-10 PROCEDURE — 37000009 HC ANESTHESIA EA ADD 15 MINS: Performed by: SURGERY

## 2022-10-10 PROCEDURE — 71000015 HC POSTOP RECOV 1ST HR: Performed by: SURGERY

## 2022-10-10 PROCEDURE — 27201423 OPTIME MED/SURG SUP & DEVICES STERILE SUPPLY: Performed by: SURGERY

## 2022-10-10 PROCEDURE — 63600175 PHARM REV CODE 636 W HCPCS

## 2022-10-10 PROCEDURE — 81025 URINE PREGNANCY TEST: CPT | Performed by: NURSE PRACTITIONER

## 2022-10-10 RX ORDER — HEPARIN SODIUM 5000 [USP'U]/ML
5000 INJECTION, SOLUTION INTRAVENOUS; SUBCUTANEOUS
Status: COMPLETED | OUTPATIENT
Start: 2022-10-10 | End: 2022-10-10

## 2022-10-10 RX ORDER — ONDANSETRON 2 MG/ML
4 INJECTION INTRAMUSCULAR; INTRAVENOUS ONCE
Status: COMPLETED | OUTPATIENT
Start: 2022-10-10 | End: 2022-10-10

## 2022-10-10 RX ORDER — HYDROMORPHONE HYDROCHLORIDE 1 MG/ML
0.5 INJECTION, SOLUTION INTRAMUSCULAR; INTRAVENOUS; SUBCUTANEOUS EVERY 5 MIN PRN
Status: ACTIVE | OUTPATIENT
Start: 2022-10-10

## 2022-10-10 RX ORDER — PROCHLORPERAZINE EDISYLATE 5 MG/ML
5 INJECTION INTRAMUSCULAR; INTRAVENOUS ONCE AS NEEDED
Status: COMPLETED | OUTPATIENT
Start: 2022-10-10 | End: 2022-10-10

## 2022-10-10 RX ORDER — HEPARIN SODIUM 5000 [USP'U]/ML
INJECTION, SOLUTION INTRAVENOUS; SUBCUTANEOUS
Status: DISCONTINUED
Start: 2022-10-10 | End: 2022-10-10 | Stop reason: HOSPADM

## 2022-10-10 RX ORDER — CEFAZOLIN SODIUM 1 G/3ML
INJECTION, POWDER, FOR SOLUTION INTRAMUSCULAR; INTRAVENOUS
Status: DISCONTINUED | OUTPATIENT
Start: 2022-10-10 | End: 2022-10-10

## 2022-10-10 RX ORDER — HYDROMORPHONE HYDROCHLORIDE 1 MG/ML
0.2 INJECTION, SOLUTION INTRAMUSCULAR; INTRAVENOUS; SUBCUTANEOUS EVERY 5 MIN PRN
Status: ACTIVE | OUTPATIENT
Start: 2022-10-10

## 2022-10-10 RX ORDER — DEXAMETHASONE SODIUM PHOSPHATE 4 MG/ML
INJECTION, SOLUTION INTRA-ARTICULAR; INTRALESIONAL; INTRAMUSCULAR; INTRAVENOUS; SOFT TISSUE
Status: DISCONTINUED | OUTPATIENT
Start: 2022-10-10 | End: 2022-10-10

## 2022-10-10 RX ORDER — ROCURONIUM BROMIDE 10 MG/ML
INJECTION, SOLUTION INTRAVENOUS
Status: DISCONTINUED | OUTPATIENT
Start: 2022-10-10 | End: 2022-10-10

## 2022-10-10 RX ORDER — MIDAZOLAM HYDROCHLORIDE 1 MG/ML
2 INJECTION INTRAMUSCULAR; INTRAVENOUS ONCE
Status: COMPLETED | OUTPATIENT
Start: 2022-10-10 | End: 2022-10-10

## 2022-10-10 RX ORDER — SODIUM CHLORIDE 9 MG/ML
INJECTION, SOLUTION INTRAVENOUS CONTINUOUS
Status: DISCONTINUED | OUTPATIENT
Start: 2022-10-10 | End: 2022-10-10 | Stop reason: HOSPADM

## 2022-10-10 RX ORDER — BUPIVACAINE HYDROCHLORIDE 2.5 MG/ML
INJECTION, SOLUTION EPIDURAL; INFILTRATION; INTRACAUDAL
Status: DISCONTINUED
Start: 2022-10-10 | End: 2022-10-10 | Stop reason: HOSPADM

## 2022-10-10 RX ORDER — KETAMINE HCL IN 0.9 % NACL 50 MG/5 ML
SYRINGE (ML) INTRAVENOUS
Status: DISCONTINUED | OUTPATIENT
Start: 2022-10-10 | End: 2022-10-10

## 2022-10-10 RX ORDER — BUPIVACAINE HYDROCHLORIDE 2.5 MG/ML
INJECTION, SOLUTION EPIDURAL; INFILTRATION; INTRACAUDAL
Status: DISCONTINUED | OUTPATIENT
Start: 2022-10-10 | End: 2022-10-10 | Stop reason: HOSPADM

## 2022-10-10 RX ORDER — ONDANSETRON 2 MG/ML
INJECTION INTRAMUSCULAR; INTRAVENOUS
Status: DISCONTINUED | OUTPATIENT
Start: 2022-10-10 | End: 2022-10-10

## 2022-10-10 RX ORDER — IPRATROPIUM BROMIDE AND ALBUTEROL SULFATE 2.5; .5 MG/3ML; MG/3ML
3 SOLUTION RESPIRATORY (INHALATION) ONCE AS NEEDED
Status: ACTIVE | OUTPATIENT
Start: 2022-10-10 | End: 2034-03-07

## 2022-10-10 RX ORDER — PROPOFOL 10 MG/ML
VIAL (ML) INTRAVENOUS
Status: DISCONTINUED | OUTPATIENT
Start: 2022-10-10 | End: 2022-10-10

## 2022-10-10 RX ORDER — FENTANYL CITRATE 50 UG/ML
INJECTION, SOLUTION INTRAMUSCULAR; INTRAVENOUS
Status: DISCONTINUED | OUTPATIENT
Start: 2022-10-10 | End: 2022-10-10

## 2022-10-10 RX ORDER — MIDAZOLAM HYDROCHLORIDE 1 MG/ML
INJECTION INTRAMUSCULAR; INTRAVENOUS
Status: DISCONTINUED
Start: 2022-10-10 | End: 2022-10-10 | Stop reason: HOSPADM

## 2022-10-10 RX ORDER — LIDOCAINE HYDROCHLORIDE 10 MG/ML
1 INJECTION, SOLUTION EPIDURAL; INFILTRATION; INTRACAUDAL; PERINEURAL ONCE
Status: DISPENSED | OUTPATIENT
Start: 2022-10-10

## 2022-10-10 RX ORDER — NEOSTIGMINE METHYLSULFATE 1 MG/ML
INJECTION, SOLUTION INTRAVENOUS
Status: DISCONTINUED | OUTPATIENT
Start: 2022-10-10 | End: 2022-10-10

## 2022-10-10 RX ORDER — GLYCOPYRROLATE 0.2 MG/ML
INJECTION INTRAMUSCULAR; INTRAVENOUS
Status: DISCONTINUED | OUTPATIENT
Start: 2022-10-10 | End: 2022-10-10

## 2022-10-10 RX ORDER — LIDOCAINE HYDROCHLORIDE 20 MG/ML
INJECTION INTRAVENOUS
Status: DISCONTINUED | OUTPATIENT
Start: 2022-10-10 | End: 2022-10-10

## 2022-10-10 RX ORDER — SODIUM CHLORIDE, SODIUM LACTATE, POTASSIUM CHLORIDE, CALCIUM CHLORIDE 600; 310; 30; 20 MG/100ML; MG/100ML; MG/100ML; MG/100ML
INJECTION, SOLUTION INTRAVENOUS CONTINUOUS
Status: ACTIVE | OUTPATIENT
Start: 2022-10-10

## 2022-10-10 RX ORDER — HYDROCODONE BITARTRATE AND ACETAMINOPHEN 5; 325 MG/1; MG/1
1 TABLET ORAL EVERY 6 HOURS PRN
Qty: 21 TABLET | Refills: 0 | Status: SHIPPED | OUTPATIENT
Start: 2022-10-10 | End: 2023-01-19

## 2022-10-10 RX ORDER — ONDANSETRON 2 MG/ML
INJECTION INTRAMUSCULAR; INTRAVENOUS
Status: DISCONTINUED
Start: 2022-10-10 | End: 2022-10-10 | Stop reason: HOSPADM

## 2022-10-10 RX ORDER — OXYCODONE AND ACETAMINOPHEN 5; 325 MG/1; MG/1
2 TABLET ORAL ONCE
Status: COMPLETED | OUTPATIENT
Start: 2022-10-10 | End: 2022-10-10

## 2022-10-10 RX ORDER — MEPERIDINE HYDROCHLORIDE 25 MG/ML
12.5 INJECTION INTRAMUSCULAR; INTRAVENOUS; SUBCUTANEOUS ONCE
Status: ACTIVE | OUTPATIENT
Start: 2022-10-10 | End: 2022-10-11

## 2022-10-10 RX ORDER — HYDROMORPHONE HYDROCHLORIDE 1 MG/ML
INJECTION, SOLUTION INTRAMUSCULAR; INTRAVENOUS; SUBCUTANEOUS
Status: COMPLETED
Start: 2022-10-10 | End: 2022-10-10

## 2022-10-10 RX ORDER — KETOROLAC TROMETHAMINE 30 MG/ML
INJECTION, SOLUTION INTRAMUSCULAR; INTRAVENOUS
Status: DISCONTINUED | OUTPATIENT
Start: 2022-10-10 | End: 2022-10-10

## 2022-10-10 RX ADMIN — OXYCODONE HYDROCHLORIDE AND ACETAMINOPHEN 2 TABLET: 5; 325 TABLET ORAL at 09:10

## 2022-10-10 RX ADMIN — Medication 10 MG: at 08:10

## 2022-10-10 RX ADMIN — PROPOFOL 50 MG: 10 INJECTION, EMULSION INTRAVENOUS at 07:10

## 2022-10-10 RX ADMIN — LIDOCAINE HYDROCHLORIDE 40 MG: 20 INJECTION, SOLUTION INTRAVENOUS at 07:10

## 2022-10-10 RX ADMIN — NEOSTIGMINE METHYLSULFATE 3.5 MG: 1 INJECTION INTRAVENOUS at 08:10

## 2022-10-10 RX ADMIN — ONDANSETRON 4 MG: 2 INJECTION INTRAMUSCULAR; INTRAVENOUS at 08:10

## 2022-10-10 RX ADMIN — Medication 20 MG: at 07:10

## 2022-10-10 RX ADMIN — FENTANYL CITRATE 25 MCG: 50 INJECTION, SOLUTION INTRAMUSCULAR; INTRAVENOUS at 07:10

## 2022-10-10 RX ADMIN — SODIUM CHLORIDE, POTASSIUM CHLORIDE, SODIUM LACTATE AND CALCIUM CHLORIDE: 600; 310; 30; 20 INJECTION, SOLUTION INTRAVENOUS at 06:10

## 2022-10-10 RX ADMIN — MIDAZOLAM 2 MG: 1 INJECTION INTRAMUSCULAR; INTRAVENOUS at 06:10

## 2022-10-10 RX ADMIN — FENTANYL CITRATE 75 MCG: 50 INJECTION, SOLUTION INTRAMUSCULAR; INTRAVENOUS at 07:10

## 2022-10-10 RX ADMIN — KETOROLAC TROMETHAMINE 30 MG: 30 INJECTION, SOLUTION INTRAMUSCULAR; INTRAVENOUS at 07:10

## 2022-10-10 RX ADMIN — CEFAZOLIN 2 G: 330 INJECTION, POWDER, FOR SOLUTION INTRAMUSCULAR; INTRAVENOUS at 07:10

## 2022-10-10 RX ADMIN — SODIUM CHLORIDE, POTASSIUM CHLORIDE, SODIUM LACTATE AND CALCIUM CHLORIDE: 600; 310; 30; 20 INJECTION, SOLUTION INTRAVENOUS at 07:10

## 2022-10-10 RX ADMIN — HYDROMORPHONE HYDROCHLORIDE 0.5 MG: 1 INJECTION, SOLUTION INTRAMUSCULAR; INTRAVENOUS; SUBCUTANEOUS at 08:10

## 2022-10-10 RX ADMIN — DEXAMETHASONE SODIUM PHOSPHATE 8 MG: 4 INJECTION, SOLUTION INTRA-ARTICULAR; INTRALESIONAL; INTRAMUSCULAR; INTRAVENOUS; SOFT TISSUE at 07:10

## 2022-10-10 RX ADMIN — PROCHLORPERAZINE EDISYLATE 5 MG: 5 INJECTION INTRAMUSCULAR; INTRAVENOUS at 09:10

## 2022-10-10 RX ADMIN — ROCURONIUM BROMIDE 50 MG: 10 SOLUTION INTRAVENOUS at 07:10

## 2022-10-10 RX ADMIN — HEPARIN SODIUM 5000 UNITS: 5000 INJECTION INTRAVENOUS; SUBCUTANEOUS at 07:10

## 2022-10-10 RX ADMIN — PROPOFOL 150 MG: 10 INJECTION, EMULSION INTRAVENOUS at 07:10

## 2022-10-10 RX ADMIN — GLYCOPYRROLATE 0.5 MG: 0.2 INJECTION INTRAMUSCULAR; INTRAVENOUS at 08:10

## 2022-10-10 NOTE — DISCHARGE SUMMARY
[facility]  Discharge Note  Short Stay    Procedure(s) (LRB):  CHOLECYSTECTOMY, LAPAROSCOPIC (N/A)      OUTCOME: Patient tolerated treatment/procedure well without complication and is now ready for discharge.    DISPOSITION: Home or Self Care    FINAL DIAGNOSIS:  <principal problem not specified>    FOLLOWUP: In clinic 2 weeks    DISCHARGE INSTRUCTIONS:    Discharge Procedure Orders   Lifting restrictions   Order Comments: No lifting >15 lbs for about 2 weeks until post-op visit     Notify your health care provider if you experience any of the following:  temperature >100.4     Notify your health care provider if you experience any of the following:  persistent nausea and vomiting or diarrhea     Notify your health care provider if you experience any of the following:  severe uncontrolled pain     Notify your health care provider if you experience any of the following:  redness, tenderness, or signs of infection (pain, swelling, redness, odor or green/yellow discharge around incision site)     No dressing needed        TIME SPENT ON DISCHARGE: 15 minutes

## 2022-10-10 NOTE — ANESTHESIA PROCEDURE NOTES
Intubation    Date/Time: 10/10/2022 7:09 AM  Performed by: Mike Gamino CRNA  Authorized by: Sravani Travis MD     Intubation:     Induction:  Intravenous    Intubated:  Postinduction    Mask Ventilation:  Easy mask    Attempts:  1    Attempted By:  CRNA    Method of Intubation:  Direct    Blade:  Alejandra 4    Laryngeal View Grade: Grade I - full view of cords      Difficult Airway Encountered?: No      Complications:  None    Airway Device:  Oral endotracheal tube    Airway Device Size:  7.5    Style/Cuff Inflation:  Cuffed (inflated to minimal occlusive pressure)    Inflation Amount (mL):  6    Tube secured:  22    Secured at:  The lips    Placement Verified By:  Capnometry    Complicating Factors:  None    Findings Post-Intubation:  BS equal bilateral and atraumatic/condition of teeth unchanged

## 2022-10-10 NOTE — TRANSFER OF CARE
Anesthesia Transfer of Care Note    Patient: Royce Quigley    Procedure(s) Performed: Procedure(s) (LRB):  CHOLECYSTECTOMY, LAPAROSCOPIC (N/A)    Patient location: PACU    Anesthesia Type: general    Transport from OR: Transported from OR on room air with adequate spontaneous ventilation    Post pain: adequate analgesia    Post assessment: no apparent anesthetic complications    Post vital signs: stable    Level of consciousness: sedated    Nausea/Vomiting: no nausea/vomiting    Complications: none    Transfer of care protocol was followed      Last vitals:   Visit Vitals  BP (!) 140/92   Pulse 62   Temp 36 °C (96.8 °F) (Temporal)   Resp 18   LMP 09/21/2022   SpO2 100%   Breastfeeding No

## 2022-10-10 NOTE — DISCHARGE INSTRUCTIONS
No heavy lifting for 6 weeks  Follow up in clinic in 2 weeks  Light meals today  Gas ex as needed for gas pocket pains  Get plenty of rest for the next day or two  You may leave incisions open to air   Stitches will dissolve and any dermabond that covers an incision should be left alone to fall off on its own  You may shower tomorrow and allow water and soap to run over incisions no scrubbing pat dry  No baths or swimming x 2 weeks showers only  Go to ER or call clinic with any temps >100.4, pus like drainage from incisions, increasing redness or tenderness that worsens, or severe pain that is unrelieved with medication  [facility] [phone]

## 2022-10-10 NOTE — ANESTHESIA POSTPROCEDURE EVALUATION
Anesthesia Post Evaluation    Patient: Royce Quigley    Procedure(s) Performed: Procedure(s) (LRB):  CHOLECYSTECTOMY, LAPAROSCOPIC (N/A)    Final Anesthesia Type: general      Patient location during evaluation: PACU  Patient participation: Yes- Able to Participate  Level of consciousness: awake and responds to stimulation  Post-procedure vital signs: reviewed and stable  Pain management: adequate  Airway patency: patent    PONV status at discharge: No PONV  Anesthetic complications: no      Cardiovascular status: blood pressure returned to baseline  Respiratory status: unassisted  Hydration status: euvolemic  Follow-up not needed.          Vitals Value Taken Time   /65 10/10/22 0845   Temp 36.2 °C (97.2 °F) 10/10/22 0845   Pulse 70 10/10/22 0845   Resp 15 10/10/22 0918   SpO2 100 % 10/10/22 0845         Event Time   Out of Recovery 08:43:00         Pain/Philippe Score: Pain Rating Prior to Med Admin: 5 (10/10/2022  9:18 AM)  Philippe Score: 9 (10/10/2022  9:34 AM)

## 2022-10-10 NOTE — OP NOTE
PATIENT NAME: Royce Quigley  MRN: [id number]  ADMIT DATE: 10/10/2022  PROCEDURE DATE: 10/10/22    SURGEON: Dr. Tobin Aguiar MD    RESIDENT: Tomy Luz MD    PREOPERATIVE DIAGNOSES:  Biliary Dyskinesia    POSTOPERATIVE DIAGNOSES:  Biliary Dyskinesia    PROCEDURE PERFORMED:  Laparoscopic Cholecystectomy    INDICATION: This is a 27 year old female with a history of chronic abdominal pain. She had a HIDA scan, showing a gallbladder EF of 20%, she was booked for an elective cholecystectomy    FINDINGS: Single artery, single duct. Critical view of safety obtained.    ANESTHESIA: General Anesthesia    DRAINS: None    SPECIMEN: Gallbladder    ESTIMATED BLOOD LOSS: 15cc    COMPLICATIONS: None    TECHNIQUE:   Informed consented was obtained prior to the procedure. All risks, benefits, alternatives were explained in detail to the patient. The patient was wheeled into the operating theatre. Anesthesia induced the patient and performed endotracheal intubation. The patient was placed in the supine position. The abdomen was prepped and draped in the standard fashion. A formal timeout was held confirming correct patient, procedure, site, preoperative antibiotics, VTE prophylaxis, and all operating room staff.     A 2mm incision was made in the left upper quadrant two finger breadths inferior to the costal margin in the midclavicular line. A veress needle was advanced directly into the peritoneal cavity, saline easily dropped into the peritoneal cavity. The abdomen was insufflated to 15 mmHg using CO2. A 1cm skin incision was made in the midline 15cm inferior to the xiphoid process. A 5mm optiview trocar was directly advanced into the peritoneal cavity at this location. Next, two 5mm trocars were passed into the peritoneal cavity under direct visualization in the right lateral abdomen inferior to the liver and in the right midclavicular line 5cm inferior to the costal margin. A 12mm trocar was passed into the peritoneal  cavity at the subxiphoid position under direct visualization. We directed our attention to the right upper quadrant. The gallbladder was identified and noted to have no inflammatory change. The fundus was grasped and retracted cranially and the infundibulum was grasped and retracted laterally. We bluntly dissected the peritoneal reflection off the infundibulum and neck of the gallbladder. With careful blunt dissection in this area, we were able to identify the cystic duct. Further careful dissection identified the cystic artery. The infundibulum of the gallbladder was dissected off the cystic plate of the liver. All fibrofatty tissue was cleanly dissected out of the triangle of Calot exposing the cystic artery and cystic duct as the only two structures entering the gallbladder with space between them and the liver. The critical view of safety had been obtained. Both duct and artery were triply clipped and divided. The gallbladder was dissected off the gallbladder fossa using Bovie electrocautery from infundibulum to fundus until free. It was placed into an EndoCatch bag and removed from the umbilical port site with no difficulty. The gallbladder fossa was examined and no bleeding or bile leak were noted. The clips on the cystic duct and artery were intact. The right upper quadrant was irrigated with saline briefly until the returning effluent was clear. All ports were removed under direct vision and no bleeding was noted. The insufflation was evacuated. The fascia at the subxiphoid incision was closed with 0 Vicryl suture. Local anesthetic was applied to each incision were closed with subcuticular 4-0 Monocryl. Dermabond was applied.    At the conclusion of the procedure all counts were correct x 2. The patient tolerated the procedure well, was extubated and transferred to PACU in stable condition.

## 2022-10-10 NOTE — INTERVAL H&P NOTE
The patient has been examined and the H&P has been reviewed:    I concur with the findings and changes have been noted since the H&P was written: Surgery rescheduled from 10/14 to 10/10    Surgery risks, benefits and alternative options discussed and understood by patient/family.    To OR for laparoscopic cholecystectomy.      There are no hospital problems to display for this patient.

## 2022-10-11 ENCOUNTER — PATIENT OUTREACH (OUTPATIENT)
Dept: EMERGENCY MEDICINE | Facility: HOSPITAL | Age: 27
End: 2022-10-11
Payer: MEDICAID

## 2022-10-11 NOTE — PROGRESS NOTES
Spoke to pt for f/u. Discussed referral and appt with [facility] Surgery clinic for tomorrow 9/27/22 and stressed importance of attending all scheduled appts, medication and budget friendly healthy eating compliance and stressed importance of attending all scheduled appts. Pt voices understanding to instructions given and appreciation.

## 2022-10-11 NOTE — PROGRESS NOTES
Spoke to pt for f/u and post op gallbladder surgery on yesterday. Pt reports she is in pain and pain meds given from discharge is not helping and she went to the ED on yesterday for the pain and new rxs was given and she still has not  rxs yet and family member will  some times this am. Stressed importance of getting rxs given and take as directed and notify pt to visit ED for worsening symptoms and to also call Surgery provider to advised them of her symptoms, pt reports that one of the surgeons came out to see her at her ED visit on yesterday. Pt denies fever or any other symptoms but having pain to incision area. Again stressed importance of getting rxs and take as directed and to visit ED for worsening symptoms. Pt voices understanding to instructions given and appreciation.

## 2022-10-18 ENCOUNTER — TELEPHONE (OUTPATIENT)
Dept: SURGERY | Facility: CLINIC | Age: 27
End: 2022-10-18
Payer: MEDICAID

## 2022-10-18 NOTE — TELEPHONE ENCOUNTER
----- Message from Paresh Frausto sent at 10/18/2022  1:53 PM CDT -----  Regarding: RE: Post Op Questions  Thanks.  ----- Message -----  From: Gilberto Daniel LPN  Sent: 10/18/2022   1:43 PM CDT  To: Paresh Frausto  Subject: RE: Post Op Questions                            Attempted to call patient with answer. Left voicemail for patient to call back. Patient called to clinic and Dr. Soliz spoke with her. Patient instructed to come into ED for any fever greater than 101 or if any incisions come open. Patient voiced understanding.   ----- Message -----  From: Paresh Frausto  Sent: 10/18/2022  11:59 AM CDT  To: Cristina Frausto RN, Gilberto Daniel LPN  Subject: Post Op Questions                                Pt called stating that she is having tightening and pressure in her upper abd and chest. Says she is swelling and experiencing shortness of breath as well with pain. PostOp appt is not until; 10/25.    Would like a call back asap, thanks!

## 2022-10-25 ENCOUNTER — OFFICE VISIT (OUTPATIENT)
Dept: SURGERY | Facility: CLINIC | Age: 27
End: 2022-10-25
Payer: MEDICAID

## 2022-10-25 VITALS
WEIGHT: 105 LBS | TEMPERATURE: 98 F | SYSTOLIC BLOOD PRESSURE: 113 MMHG | HEIGHT: 60 IN | DIASTOLIC BLOOD PRESSURE: 76 MMHG | RESPIRATION RATE: 18 BRPM | HEART RATE: 78 BPM | OXYGEN SATURATION: 100 % | BODY MASS INDEX: 20.62 KG/M2

## 2022-10-25 DIAGNOSIS — R10.84 GENERALIZED ABDOMINAL PAIN: Primary | ICD-10-CM

## 2022-10-25 LAB
ESTRIOL SERPL-MCNC: NORMAL NG/ML
ESTROGEN SERPL-MCNC: NORMAL PG/ML
INSULIN SERPL-ACNC: NORMAL U[IU]/ML
LAB AP CLINICAL INFORMATION: NORMAL
LAB AP GROSS DESCRIPTION: NORMAL
LAB AP REPORT FOOTNOTES: NORMAL
T3RU NFR SERPL: NORMAL %

## 2022-10-25 PROCEDURE — 99214 OFFICE O/P EST MOD 30 MIN: CPT | Mod: PBBFAC

## 2022-10-25 NOTE — PROGRESS NOTES
"Surgery Clinic Note     CC: 2 wk f/u post laparoscopic cholecystectomy    HPI:  Royce Quigley 27-year-old female with PMHx of graves disease presenting to clinic for 2 wk f/u post laparoscopic cholecystectomy. Patient reports manageable pain with palpation with a "knot" at the midline epigastric incision site. Patient reports only partial resolution of initial complaint of diffuse abdominal pain prior to surgery and will follow up with GI. Slightly discouraged that the cholecystectomy did not completely resolve her abdominal pain, however that eventuality was discussed at length prior to surgery. Patient reports no pain on other incision sites. Patient is having regular daily BM, urinating adequately. Denies f/c/n/v/CP/SOB.    PMH:   Past Medical History:   Diagnosis Date    Graves disease         PSH:   Past Surgical History:   Procedure Laterality Date    CHOLECYSTECTOMY      DX-LAP, EVACUATION HEMOPERITONEUM  11/30/2021    LAPAROSCOPIC CHOLECYSTECTOMY N/A 10/10/2022    Procedure: CHOLECYSTECTOMY, LAPAROSCOPIC;  Surgeon: Tobin Aguiar MD;  Location: [facility];  Service: General;  Laterality: N/A;    TONSILLECTOMY          Fam Hx: History reviewed. No pertinent family history.     Social Hx:   Social History     Tobacco Use    Smoking status: Every Day     Packs/day: 1.00     Years: 14.00     Pack years: 14.00     Types: Cigarettes     Passive exposure: Never    Smokeless tobacco: Never   Substance Use Topics    Alcohol use: Never    Drug use: Not Currently        Allergies:   Review of patient's allergies indicates:   Allergen Reactions    Diphenhydramine Anxiety and Other (See Comments)    Diphenhydramine hcl Anxiety     Other reaction(s): felt funny        ROS: Negative except above     Current Outpatient Medications on File Prior to Visit   Medication Sig Dispense Refill    ALPRAZolam (XANAX) 1 MG tablet Take 1 mg by mouth daily as needed.      chlorhexidine (PERIDEX) 0.12 % solution 5 mLs 3 (three) times " daily.      clindamycin (CLEOCIN) 300 MG capsule Take 300 mg by mouth 3 (three) times daily before meals.      dicyclomine (BENTYL) 20 mg tablet       DULoxetine (CYMBALTA) 60 MG capsule Take 60 mg by mouth once daily.  5    methIMAzole (TAPAZOLE) 5 MG Tab Take 1 tablet (5 mg total) by mouth once daily. 30 tablet 3    pantoprazole (PROTONIX) 40 MG tablet Take 1 tablet (40 mg total) by mouth once daily. 30 tablet 11    propranolol (INDERAL) 10 MG tablet Take 10 mg by mouth 2 (two) times daily.  5    gabapentin (NEURONTIN) 300 MG capsule Take 1 capsule (300 mg total) by mouth 3 (three) times daily. for 10 days 30 capsule 0    HYDROcodone-acetaminophen (NORCO) 5-325 mg per tablet Take 1 tablet by mouth every 6 (six) hours as needed for Pain. (Patient not taking: Reported on 10/25/2022) 21 tablet 0    ondansetron (ZOFRAN-ODT) 4 MG TbDL Take 4 mg by mouth every 6 (six) hours as needed.       Current Facility-Administered Medications on File Prior to Visit   Medication Dose Route Frequency Provider Last Rate Last Admin    albuterol-ipratropium 2.5 mg-0.5 mg/3 mL nebulizer solution 3 mL  3 mL Nebulization Once PRN Sravani Travis MD        HYDROmorphone injection 0.2 mg  0.2 mg Intravenous Q5 Min PRN Sravani Travis MD        HYDROmorphone injection 0.5 mg  0.5 mg Intravenous Q5 Min PRN Sravani Travis MD   0.5 mg at 10/10/22 0833    lactated ringers infusion   Intravenous Continuous Sravani Travis MD 10 mL/hr at 10/10/22 0659 New Bag at 10/10/22 0659    LIDOcaine (PF) 10 mg/ml (1%) injection 10 mg  1 mL Intradermal Once MELVI Cortes           Physical Exam  /76 (BP Location: Right arm, Patient Position: Sitting)   Pulse 78   Temp 97.5 °F (36.4 °C) (Oral)   Resp 18   Ht 5' (1.524 m)   Wt 47.6 kg (105 lb)   LMP 10/19/2022 (Approximate)   SpO2 100%   BMI 20.51 kg/m²   General: NAD, AAO X 3  CV: Regular rate and rhythm without murmurs  Resp: Clear to ascultation bilaterally  Abdomen:  soft, non-tender, non-distended, bowel sounds present, incision sites are healing well, no erythema, non tender to palpation, no discharge, and no bleeding.    Surgical Pathology:   Final Diagnosis      Gallbladder, cholecystectomy:   - Chronic cholecystitis and cholelithiasis.            Electronically signed by Kimberly Hamilton MD on 10/12/2022 at 1205       ASSESSMENT/PLAN   Royce Quigley 27-year-old female with PMHx of graves disease presenting to clinic for 2 wk f/u post laparoscopic cholecystectomy.     - RTC PRN  - Patient educated on continued weight bearing restrictions post surgery  - Reviewed return to ED parameters with patient    Bruce Sotomayor  [employer] MS3    Above note edited as needed by  Antonio Soliz MD  [employer] General Surgery PGY-1

## 2022-10-26 ENCOUNTER — PATIENT OUTREACH (OUTPATIENT)
Dept: EMERGENCY MEDICINE | Facility: HOSPITAL | Age: 27
End: 2022-10-26
Payer: MEDICAID

## 2022-10-26 NOTE — PROGRESS NOTES
Spoke to pt for f/u, reports feeling better, had post op appt with [facility] surgery clinic on yesterday. Pt does report occasional sob and palpations, encouraged pt to visit ED to be evaluated and to notify her pcp and cardiology, pt reports that they are aware of this, again stressed importance of visiting ED for evaluation of her sob and palpations and to notify pcp and cardiology and to obtain appt, pt reports she has pcp appt on Friday 10/28/22 and she will call her cardiology to obtain f/u appt. Discussed medication and budget friendly eating compliance, upcoming appt, ED utilization and stressed importance of f/u care with pcp, mental health provider and all providers and ancillary care. Pt denies si/hi and mental health crisis. Pt voices understanding to instructions given and appreciation.      PCP lv-8/31/22, nv 10/28/22  Follow-Up Specialist Appt Scheduled : has not had opportunity to obtain f/u appt, pt reports will call for appt.    Type of Specialist :   RAFITA CROWDER NP (MENTAL HEALTH)  LV-8/2022, NV- 3 MONTHS  [facility] LV- 5/2022, NV- pt reports will call for f/u appt  MENTAL HEALTH COUNSELOR LV-5/20/22, NV-11/2022 pt to call and verify appt date and time  [facility] END[facility]INE [facility]- NV 1/19/2023 at 8:30 am        Providers Patient Visited Last Year :   [facility]  DENNIS DUPREEC (PCP)  ANIAY CROWDER. PA (MENTAL HEALTH PROVIDER)  [employer] MENTAL HEALTH COUNSELOR    [facility] ENDOCRINE CLINIC   DR. EPHRAIM VANCE (GI)  Quyen Rodriguez, MSN, APRN, FNP-BC    DIANE MONTOYA MD (GENERAL SURGERY)  [facility]ERY [facility]

## 2022-10-27 ENCOUNTER — PATIENT OUTREACH (OUTPATIENT)
Dept: EMERGENCY MEDICINE | Facility: HOSPITAL | Age: 27
End: 2022-10-27
Payer: MEDICAID

## 2022-11-04 ENCOUNTER — LAB VISIT (OUTPATIENT)
Dept: LAB | Facility: HOSPITAL | Age: 27
End: 2022-11-04
Attending: NURSE PRACTITIONER
Payer: MEDICAID

## 2022-11-04 DIAGNOSIS — I49.3 VENTRICULAR PREMATURE BEATS: Primary | ICD-10-CM

## 2022-11-04 DIAGNOSIS — R00.2 PALPITATIONS: ICD-10-CM

## 2022-11-04 LAB — TROPONIN I SERPL-MCNC: <0.01 NG/ML (ref 0–0.04)

## 2022-11-04 PROCEDURE — 84484 ASSAY OF TROPONIN QUANT: CPT

## 2022-11-04 PROCEDURE — 36415 COLL VENOUS BLD VENIPUNCTURE: CPT

## 2022-11-16 ENCOUNTER — PATIENT OUTREACH (OUTPATIENT)
Dept: EMERGENCY MEDICINE | Facility: HOSPITAL | Age: 27
End: 2022-11-16
Payer: MEDICAID

## 2022-11-22 NOTE — PROGRESS NOTES
Spoke to pt for f/u, doing well, no complaints. Discussed medication and budget friendly eating compliance, upcoming appt, ED utilization and stressed importance of f/u care with pcp, mental health provider and all providers and ancillary care. Pt reports seen pcp on 11/14/22 and will call for f/u appt. Pt denies si/hi and mental health crisis. Pt voices understanding to instructions given and appreciation. Pt request to mail appt letter to her, verified pt address with pt, appt letter mail to pt.  PCP lv-11/14/22    Appointments:   RAFITA CROWDER NP (MENTAL HEALTH)  LV-8/2022, NV- 3 MONTHS  [facility] LV- 5/2022, NV- pt reports will call for f/u appt  MENTAL HEALTH COUNSELOR LV-5/20/22, NV-11/2022 pt to call and verify appt date and time  [facility] ENDOCRINE CLINIC- NV 1/19/2023 at 8:30 am     Providers Patient Visited Last Year :   [facility]  MELVI DUPREE-C (PCP)  ANIYA CROWDER. PA (MENTAL HEALTH PROVIDER)  RES[employer] MANAGEMENT MENTAL HEALTH COUNSELOR    [facility] ENDOCRINE CLINIC   DR. EPHRAIM VANCE (GI)  Quyen Rodriguez, MSN, APRN, FNP-BC    DIANE MONTOYA MD (GENERAL SURGERY)  [facility] SURGERY CLINIC

## 2022-12-07 ENCOUNTER — HOSPITAL ENCOUNTER (EMERGENCY)
Facility: HOSPITAL | Age: 27
Discharge: HOME | End: 2022-12-07
Attending: EMERGENCY MEDICINE
Payer: MEDICAID

## 2022-12-07 VITALS
DIASTOLIC BLOOD PRESSURE: 70 MMHG | HEIGHT: 60 IN | RESPIRATION RATE: 18 BRPM | HEART RATE: 88 BPM | SYSTOLIC BLOOD PRESSURE: 104 MMHG | BODY MASS INDEX: 21.47 KG/M2 | WEIGHT: 109.38 LBS | OXYGEN SATURATION: 98 % | TEMPERATURE: 98 F

## 2022-12-07 DIAGNOSIS — R10.2 PELVIC PAIN: Primary | ICD-10-CM

## 2022-12-07 LAB
ALBUMIN SERPL-MCNC: 4.5 GM/DL (ref 3.5–5)
ALBUMIN/GLOB SERPL: 1.2 RATIO (ref 1.1–2)
ALP SERPL-CCNC: 86 UNIT/L (ref 40–150)
ALT SERPL-CCNC: 9 UNIT/L (ref 0–55)
APPEARANCE UR: CLEAR
AST SERPL-CCNC: 11 UNIT/L (ref 5–34)
B-HCG UR QL: NEGATIVE
BACTERIA #/AREA URNS AUTO: ABNORMAL /HPF
BASOPHILS # BLD AUTO: 0.05 X10(3)/MCL (ref 0–0.2)
BASOPHILS NFR BLD AUTO: 0.5 %
BILIRUB UR QL STRIP.AUTO: NEGATIVE MG/DL
BILIRUBIN DIRECT+TOT PNL SERPL-MCNC: 0.3 MG/DL
BUN SERPL-MCNC: 7.8 MG/DL (ref 7–18.7)
C TRACH DNA SPEC QL NAA+PROBE: NOT DETECTED
CALCIUM SERPL-MCNC: 9.4 MG/DL (ref 8.4–10.2)
CHLORIDE SERPL-SCNC: 105 MMOL/L (ref 98–107)
CO2 SERPL-SCNC: 23 MMOL/L (ref 22–29)
COLOR UR AUTO: ABNORMAL
CREAT SERPL-MCNC: 0.7 MG/DL (ref 0.55–1.02)
CTP QC/QA: YES
EOSINOPHIL # BLD AUTO: 0.28 X10(3)/MCL (ref 0–0.9)
EOSINOPHIL NFR BLD AUTO: 2.5 %
ERYTHROCYTE [DISTWIDTH] IN BLOOD BY AUTOMATED COUNT: 14.3 % (ref 11.5–17)
GFR SERPLBLD CREATININE-BSD FMLA CKD-EPI: >60 MLS/MIN/1.73/M2
GLOBULIN SER-MCNC: 3.7 GM/DL (ref 2.4–3.5)
GLUCOSE SERPL-MCNC: 93 MG/DL (ref 74–100)
GLUCOSE UR QL STRIP.AUTO: NORMAL MG/DL
GROUP & RH: NORMAL
HCT VFR BLD AUTO: 35.7 % (ref 37–47)
HGB BLD-MCNC: 11.4 GM/DL (ref 12–16)
HYALINE CASTS #/AREA URNS LPF: ABNORMAL /LPF
IMM GRANULOCYTES # BLD AUTO: 0.03 X10(3)/MCL (ref 0–0.04)
IMM GRANULOCYTES NFR BLD AUTO: 0.3 %
INDIRECT COOMBS GEL: NORMAL
KETONES UR QL STRIP.AUTO: NEGATIVE MG/DL
LEUKOCYTE ESTERASE UR QL STRIP.AUTO: NEGATIVE UNIT/L
LIPASE SERPL-CCNC: 12 U/L
LYMPHOCYTES # BLD AUTO: 2.56 X10(3)/MCL (ref 0.6–4.6)
LYMPHOCYTES NFR BLD AUTO: 23.3 %
MCH RBC QN AUTO: 27.1 PG (ref 27–31)
MCHC RBC AUTO-ENTMCNC: 31.9 MG/DL (ref 33–36)
MCV RBC AUTO: 85 FL (ref 80–94)
MONOCYTES # BLD AUTO: 0.8 X10(3)/MCL (ref 0.1–1.3)
MONOCYTES NFR BLD AUTO: 7.3 %
MUCOUS THREADS URNS QL MICRO: ABNORMAL /LPF
N GONORRHOEA DNA SPEC QL NAA+PROBE: NOT DETECTED
NEUTROPHILS # BLD AUTO: 7.3 X10(3)/MCL (ref 2.1–9.2)
NEUTROPHILS NFR BLD AUTO: 66.1 %
NITRITE UR QL STRIP.AUTO: NEGATIVE
NRBC BLD AUTO-RTO: 0 %
PH UR STRIP.AUTO: 6.5 [PH]
PLATELET # BLD AUTO: 290 X10(3)/MCL (ref 130–400)
PMV BLD AUTO: 11.7 FL (ref 7.4–10.4)
POTASSIUM SERPL-SCNC: 3.5 MMOL/L (ref 3.5–5.1)
PROT SERPL-MCNC: 8.2 GM/DL (ref 6.4–8.3)
PROT UR QL STRIP.AUTO: NEGATIVE MG/DL
RBC # BLD AUTO: 4.2 X10(6)/MCL (ref 4.2–5.4)
RBC #/AREA URNS AUTO: ABNORMAL /HPF
RBC UR QL AUTO: NEGATIVE UNIT/L
SODIUM SERPL-SCNC: 139 MMOL/L (ref 136–145)
SP GR UR STRIP.AUTO: 1.01
SQUAMOUS #/AREA URNS LPF: ABNORMAL /HPF
UROBILINOGEN UR STRIP-ACNC: NORMAL MG/DL
WBC # SPEC AUTO: 11 X10(3)/MCL (ref 4.5–11.5)
WBC #/AREA URNS AUTO: ABNORMAL /HPF

## 2022-12-07 PROCEDURE — 96375 TX/PRO/DX INJ NEW DRUG ADDON: CPT

## 2022-12-07 PROCEDURE — 81025 URINE PREGNANCY TEST: CPT | Performed by: PHYSICIAN ASSISTANT

## 2022-12-07 PROCEDURE — 81001 URINALYSIS AUTO W/SCOPE: CPT | Performed by: PHYSICIAN ASSISTANT

## 2022-12-07 PROCEDURE — 96374 THER/PROPH/DIAG INJ IV PUSH: CPT | Mod: 59

## 2022-12-07 PROCEDURE — 25500020 PHARM REV CODE 255: Performed by: PHYSICIAN ASSISTANT

## 2022-12-07 PROCEDURE — 25000003 PHARM REV CODE 250: Performed by: PHYSICIAN ASSISTANT

## 2022-12-07 PROCEDURE — 86901 BLOOD TYPING SEROLOGIC RH(D): CPT | Performed by: PHYSICIAN ASSISTANT

## 2022-12-07 PROCEDURE — 85025 COMPLETE CBC W/AUTO DIFF WBC: CPT | Performed by: PHYSICIAN ASSISTANT

## 2022-12-07 PROCEDURE — 87591 N.GONORRHOEAE DNA AMP PROB: CPT | Performed by: PHYSICIAN ASSISTANT

## 2022-12-07 PROCEDURE — 99285 EMERGENCY DEPT VISIT HI MDM: CPT | Mod: 25

## 2022-12-07 PROCEDURE — 83690 ASSAY OF LIPASE: CPT | Performed by: PHYSICIAN ASSISTANT

## 2022-12-07 PROCEDURE — 96361 HYDRATE IV INFUSION ADD-ON: CPT

## 2022-12-07 PROCEDURE — 63600175 PHARM REV CODE 636 W HCPCS: Performed by: PHYSICIAN ASSISTANT

## 2022-12-07 PROCEDURE — 80053 COMPREHEN METABOLIC PANEL: CPT | Performed by: PHYSICIAN ASSISTANT

## 2022-12-07 PROCEDURE — 87491 CHLMYD TRACH DNA AMP PROBE: CPT | Performed by: PHYSICIAN ASSISTANT

## 2022-12-07 RX ORDER — SODIUM CHLORIDE 9 MG/ML
1000 INJECTION, SOLUTION INTRAVENOUS
Status: COMPLETED | OUTPATIENT
Start: 2022-12-07 | End: 2022-12-07

## 2022-12-07 RX ORDER — MORPHINE SULFATE 2 MG/ML
4 INJECTION, SOLUTION INTRAMUSCULAR; INTRAVENOUS
Status: DISCONTINUED | OUTPATIENT
Start: 2022-12-07 | End: 2022-12-07 | Stop reason: HOSPADM

## 2022-12-07 RX ORDER — KETOROLAC TROMETHAMINE 30 MG/ML
15 INJECTION, SOLUTION INTRAMUSCULAR; INTRAVENOUS
Status: COMPLETED | OUTPATIENT
Start: 2022-12-07 | End: 2022-12-07

## 2022-12-07 RX ORDER — ONDANSETRON 2 MG/ML
4 INJECTION INTRAMUSCULAR; INTRAVENOUS
Status: COMPLETED | OUTPATIENT
Start: 2022-12-07 | End: 2022-12-07

## 2022-12-07 RX ADMIN — ONDANSETRON 4 MG: 2 INJECTION INTRAMUSCULAR; INTRAVENOUS at 04:12

## 2022-12-07 RX ADMIN — SODIUM CHLORIDE 1000 ML: 9 INJECTION, SOLUTION INTRAVENOUS at 04:12

## 2022-12-07 RX ADMIN — IOHEXOL 100 ML: 350 INJECTION, SOLUTION INTRAVENOUS at 06:12

## 2022-12-07 RX ADMIN — KETOROLAC TROMETHAMINE 15 MG: 30 INJECTION, SOLUTION INTRAMUSCULAR at 04:12

## 2022-12-07 NOTE — ED PROVIDER NOTES
Encounter Date: 12/7/2022            History     Chief Complaint   Patient presents with    Abdominal Pain     Pt c/o pelvic and vaginal pain that started about an hour ago. Hx of ovarian cyst rupture.Pt states the pain feels the same as last time.     Patient with pmhx of Grave's disease and hemoperitoneum from ruptured ovarian cyst presents today c/o pelvic pain (right greater than left) that started about 1-2 hours ago after having intercourse. She also endorses nausea and vomiting since onset of pain. Patient is concerned she has another ruptured cyst. She says the pain feels similar to last time when she had to have surgery. Denies fever, chills, vaginal bleeding, abnormal vaginal discharge, dysuria, hematuria, low back pain.    The history is provided by the patient. No  was used.   Review of patient's allergies indicates:   Allergen Reactions    Diphenhydramine Anxiety and Other (See Comments)    Diphenhydramine hcl Anxiety     Other reaction(s): felt funny     Past Medical History:   Diagnosis Date    Graves disease      Past Surgical History:   Procedure Laterality Date    CHOLECYSTECTOMY      DX-LAP, EVACUATION HEMOPERITONEUM  11/30/2021    LAPAROSCOPIC CHOLECYSTECTOMY N/A 10/10/2022    Procedure: CHOLECYSTECTOMY, LAPAROSCOPIC;  Surgeon: Tobin Aguiar MD;  Location: [facility];  Service: General;  Laterality: N/A;    TONSILLECTOMY       History reviewed. No pertinent family history.  Social History     Tobacco Use    Smoking status: Every Day     Packs/day: 1.00     Years: 14.00     Pack years: 14.00     Types: Cigarettes     Passive exposure: Never    Smokeless tobacco: Never   Substance Use Topics    Alcohol use: Never    Drug use: Not Currently     Review of Systems   Constitutional:  Negative for chills and fever.   Respiratory:  Negative for cough, chest tightness and shortness of breath.    Cardiovascular:  Negative for chest pain, palpitations and leg swelling.    Gastrointestinal:  Positive for abdominal pain, nausea and vomiting. Negative for constipation and diarrhea.   Genitourinary:  Positive for pelvic pain. Negative for dysuria, flank pain and hematuria.   Musculoskeletal:  Negative for arthralgias and myalgias.   Skin:  Negative for rash.   Neurological:  Negative for syncope, light-headedness and headaches.   All other systems reviewed and are negative.    Physical Exam     Initial Vitals [12/07/22 1533]   BP Pulse Resp Temp SpO2   110/68 105 18 98.8 °F (37.1 °C) 99 %      MAP       --         Physical Exam    Vitals reviewed.  Constitutional: She appears well-developed and well-nourished. She is not diaphoretic. No distress.   HENT:   Head: Normocephalic and atraumatic.   Mouth/Throat: Oropharynx is clear and moist. No oropharyngeal exudate.   Eyes: Conjunctivae and EOM are normal.   Neck: Neck supple.   Normal range of motion.  Cardiovascular:  Normal rate, regular rhythm, normal heart sounds and intact distal pulses.           Pulmonary/Chest: Breath sounds normal. No respiratory distress.   Abdominal: Abdomen is soft and flat. Bowel sounds are normal. There is abdominal tenderness in the right lower quadrant and suprapubic area. There is guarding.   Musculoskeletal:         General: No edema.      Cervical back: Normal range of motion and neck supple.     Neurological: She is alert and oriented to person, place, and time. GCS score is 15. GCS eye subscore is 4. GCS verbal subscore is 5. GCS motor subscore is 6.   Skin: Skin is warm and dry. Capillary refill takes less than 2 seconds. No rash noted.   Psychiatric: She has a normal mood and affect.       ED Course   Procedures  Labs Reviewed   URINALYSIS, REFLEX TO URINE CULTURE - Abnormal; Notable for the following components:       Result Value    Squamous Epithelial Cells, UA Trace (*)     Mucous, UA Trace (*)     All other components within normal limits   COMPREHENSIVE METABOLIC PANEL - Abnormal; Notable for  the following components:    Globulin 3.7 (*)     All other components within normal limits   CBC WITH DIFFERENTIAL - Abnormal; Notable for the following components:    Hgb 11.4 (*)     Hct 35.7 (*)     MCHC 31.9 (*)     MPV 11.7 (*)     All other components within normal limits   CHLAMYDIA/GONORRHOEAE(GC), PCR - Normal    Narrative:     The Xpert CT/NG test, performed on the GeneXpert system is a qualitative in vitro real-time polymerase chain reaction (PCR) test for the automated detected and differentiation for genomic DNA from Chlamydia trachomatis (CT) and/or Neisseria gonorrhoeae (NG).   LIPASE - Normal   CBC W/ AUTO DIFFERENTIAL    Narrative:     The following orders were created for panel order CBC auto differential.  Procedure                               Abnormality         Status                     ---------                               -----------         ------                     CBC with Differential[[id number]]        Abnormal            Final result                 Please view results for these tests on the individual orders.   EXTRA TUBES    Narrative:     The following orders were created for panel order EXTRA TUBES.  Procedure                               Abnormality         Status                     ---------                               -----------         ------                     Light Blue Top Hold[[id number]]                              In process                 Red Top Hold[[id number]]                                     In process                   Please view results for these tests on the individual orders.   LIGHT BLUE TOP HOLD   RED TOP HOLD   POCT URINE PREGNANCY   TYPE & SCREEN          Imaging Results              CT Abdomen Pelvis With Contrast (Final result)  Result time 12/07/22 18:09:15      Final result by Rohith Tim MD (12/07/22 18:09:15)                   Impression:      Free fluid in the pelvis.  Recent ultrasound showed some ovarian cysts.  Findings may  represent ovarian cyst rupture.    The appendix is seen and has air within it and appears unremarkable.      Electronically signed by: Rohith Abioladonte  Date:    12/07/2022  Time:    18:09               Narrative:    EXAMINATION:  CT ABDOMEN PELVIS WITH CONTRAST    CLINICAL HISTORY:  RLQ abdominal pain (Age >= 14y);pelvic pain (R);    TECHNIQUE:  Low dose axial images, sagittal and coronal reformations were obtained from the lung bases to the pubic symphysis following the IV administration of contrast. Automatic exposure control (AEC) is utilized to reduce patient radiation exposure.    COMPARISON:  10/10/2022    FINDINGS:  The lung bases are clear.    The liver appears normal.  No liver mass or lesion is seen.  Portal and hepatic veins appear normal.    The patient is status post cholecystectomy.    The pancreas appears normal.  No pancreatic mass or lesion is seen.    The spleen shows no acute abnormality.    The adrenal glands appear normal.  No adrenal nodule is seen.    The kidneys appear normal.  No hydronephrosis is seen.  No hydroureter is seen.  No nephrolithiasis is seen.  No obvious ureteral stones are seen.    Urinary bladder appears grossly unremarkable.    The appendix appears air-filled with no obvious inflammatory changes associated with it..    No colitis is seen.  No diverticulitis is seen.  No obvious colonic mass or lesion is seen.    There is some free fluid in the pelvis.  The recent ultrasound showed multiple ovarian cysts.  Findings may represent the recent ovarian cyst rupture.                                       US Pelvis Comp with Transvag NON-OB (xpd (Final result)  Result time 12/07/22 17:39:19      Final result by Leela Mehta MD (12/07/22 17:39:19)                   Impression:      Small volume complex free fluid in the pelvis.  Appearance is nonspecific but could be related to ruptured right ovarian corpus luteal cyst.      Electronically signed by: Leela  Tyson  Date:    12/07/2022  Time:    17:39               Narrative:    EXAMINATION:  US PELVIS COMP WITH TRANSVAG NON-OB (XPD)    CLINICAL HISTORY:  pelvic pain;    TECHNIQUE:  Transabdominal sonography of the pelvis was performed, followed by transvaginal sonography to better evaluate the uterus and ovaries.    COMPARISON:  CT abdomen pelvis 10/10/2022    FINDINGS:  UTERUS/CERVIX:    Retroverted    Size: 7.2 x 5.8 x 4.4 cm    Masses: None    Endometrium: 13 mm.  Trace fluid in the endometrial cavity.    RIGHT OVARY:    Size: 3.8 x 3 x 2.5 cm    Appearance: Normal sonographic appearance.  Right ovary corpus luteal cyst.    Vascular flow: Normal spectral waveforms.    LEFT OVARY:    Size: 2.4 x 2.3 x 1.4 cm    Appearance: Normal sonographic appearance.    Vascular Flow: Normal spectral waveforms.    FREE FLUID:    Small volume complex free fluid in the pelvis and right adnexa.                                       Medications   morphine injection 4 mg (has no administration in time range)   ondansetron injection 4 mg (4 mg Intravenous Given 12/7/22 1619)   0.9%  NaCl infusion (0 mLs Intravenous Stopped 12/7/22 1821)   ketorolac injection 15 mg (15 mg Intravenous Given 12/7/22 1619)   iohexoL (OMNIPAQUE 350) injection 100 mL (100 mLs Intravenous Given 12/7/22 1803)                 ED Course as of 12/07/22 2036   Wed Dec 07, 2022   1828 Care transitioned to Shyanne Lynch at 19:00 [SA]   2034 GYN evaluated patient.  Per GYN, likely 2/2 right ruptured hemorrhagic corpus luteum. Pt is currently hemodynamically and clinically stable.   She will follow up with her GYN.   [ER]      ED Course User Index  [ER] KAYA Gonzalez  [SA] KAYA Espinoza             Clinical Impression:   Final diagnoses:  [R10.2] Pelvic pain (Primary)      ED Disposition Condition    Discharge Stable          ED Prescriptions    None       Follow-up Information       Follow up With Specialties Details Why Contact Info    [facility]  - Emergency Dept Emergency Medicine  As needed, If symptoms worsen [address] [facility] [id number]  [phone]             KAYA Gonzalez  12/07/22 2036

## 2022-12-08 NOTE — ED NOTES
Report given by ebonie mendoza. Pt aaox4 family at bedside. Pt has a right ac 20g saline locked. Pt states she feels better than she did when she first arrived. Pt lungs clear.

## 2022-12-08 NOTE — CONSULTS
[facility] CLINIC NOTE  [facility], [facility]  [address] [place] [address]  Phone: [phone]  Fax: [phone]    [facility] Gynecology Consult - Resident Note    Consulting Physician: Dr. Santamaria  Reason for consult: RLQ Pain  Subjective:        HPI:   Royce Quigley is an 27 y.o.  presents to the ED with acute onset RLQ pain that began after intercourse. Pt has a h/o diagnostic laparoscopy  with evacuation of hemoperitoneum (~1500ml) in 2021 and feels like some of her symptoms now are similar to that time. She is anxious about possible need for surgical intervention given the episode in 2021. She reports mild nausea, which is her baseline for the past 2 years due to chronic GI issues.    [facility] is [facility] [place], [place]    Denies fever, chills, vaginal bleeding, abnormal vaginal discharge, dysuria, hematuria, low back pain.    Review of systems  Negative unless noted in HPI    Past Medical History  Past Medical History:   Diagnosis Date    Graves disease        Past Surgical History  Past Surgical History:   Procedure Laterality Date    CHOLECYSTECTOMY      DX-LAP, EVACUATION HEMOPERITONEUM  2021    LAPAROSCOPIC CHOLECYSTECTOMY N/A 10/10/2022    Procedure: CHOLECYSTECTOMY, LAPAROSCOPIC;  Surgeon: Tobin Aguiar MD;  Location: [facility];  Service: General;  Laterality: N/A;    TONSILLECTOMY         Obstetrical History  OB History   No obstetric history on file.       Gynecologic History  Patient's last menstrual period was 2022 (approximate).  Regular periods lasting 5 days, 3 ppd  STD history: HSV  Pap smear history: Reports h/o HSIL , following up with primary Gyn    Allergies  Review of patient's allergies indicates:   Allergen Reactions    Diphenhydramine Anxiety and Other (See Comments)    Diphenhydramine hcl Anxiety     Other reaction(s): felt funny       Family History  History reviewed. No pertinent family history.    Social History  Social  History     Tobacco Use    Smoking status: Every Day     Packs/day: 1.00     Years: 14.00     Pack years: 14.00     Types: Cigarettes     Passive exposure: Never    Smokeless tobacco: Never   Substance Use Topics    Alcohol use: Never    Drug use: Not Currently       Overview of active problems  Active Problem List with Overview Notes    Diagnosis Date Noted    Graves disease 08/30/2019       Medications  Home medications  (Not in a hospital admission)      Current Inpatient medications    Current Facility-Administered Medications:     morphine injection 4 mg, 4 mg, Intravenous, ED 1 Time, KAYA Espinoza    Current Outpatient Medications:     ALPRAZolam (XANAX) 1 MG tablet, Take 1 mg by mouth daily as needed., Disp: , Rfl:     chlorhexidine (PERIDEX) 0.12 % solution, 5 mLs 3 (three) times daily., Disp: , Rfl:     clindamycin (CLEOCIN) 300 MG capsule, Take 300 mg by mouth 3 (three) times daily before meals., Disp: , Rfl:     dicyclomine (BENTYL) 20 mg tablet, , Disp: , Rfl:     DULoxetine (CYMBALTA) 60 MG capsule, Take 60 mg by mouth once daily., Disp: , Rfl: 5    gabapentin (NEURONTIN) 300 MG capsule, Take 1 capsule (300 mg total) by mouth 3 (three) times daily. for 10 days, Disp: 30 capsule, Rfl: 0    HYDROcodone-acetaminophen (NORCO) 5-325 mg per tablet, Take 1 tablet by mouth every 6 (six) hours as needed for Pain. (Patient not taking: Reported on 10/25/2022), Disp: 21 tablet, Rfl: 0    methIMAzole (TAPAZOLE) 5 MG Tab, Take 1 tablet (5 mg total) by mouth once daily., Disp: 30 tablet, Rfl: 3    ondansetron (ZOFRAN-ODT) 4 MG TbDL, Take 4 mg by mouth every 6 (six) hours as needed., Disp: , Rfl:     pantoprazole (PROTONIX) 40 MG tablet, Take 1 tablet (40 mg total) by mouth once daily., Disp: 30 tablet, Rfl: 11    propranolol (INDERAL) 10 MG tablet, Take 10 mg by mouth 2 (two) times daily., Disp: , Rfl: 5    Facility-Administered Medications Ordered in Other Encounters:     albuterol-ipratropium 2.5 mg-0.5  mg/3 mL nebulizer solution 3 mL, 3 mL, Nebulization, Once PRN, Sravani Travis MD    HYDROmorphone injection 0.2 mg, 0.2 mg, Intravenous, Q5 Min PRN, Sravani Travis MD    HYDROmorphone injection 0.5 mg, 0.5 mg, Intravenous, Q5 Min PRN, Sravani Travis MD, 0.5 mg at 10/10/22 0833    lactated ringers infusion, , Intravenous, Continuous, Sravani Travis MD, Last Rate: 10 mL/hr at 10/10/22 0659, New Bag at 10/10/22 0659    LIDOcaine (PF) 10 mg/ml (1%) injection 10 mg, 1 mL, Intradermal, Once, MELVI Cortes       Objective:     Vitals:    12/07/22 1835 12/07/22 1901 12/07/22 1931 12/07/22 2001   BP:  103/70 101/67 104/79   Pulse: 95 95 96 107   Resp:       Temp:       TempSrc:       SpO2: 100% 100% 99% 100%   Weight:       Height:         Body mass index is 21.36 kg/m².    I/O last 3 completed shifts:  In: 1000 [I.V.:1000]  Out: -     Physical Exam:  General: alert and oriented, in no acute distress   Lungs: clear to auscultation bilaterally   Heart: regular rate and rhythm   Abdomen: Soft, non-distended, non-tender, no rebound tenderness or guarding   DVT Evaluation: No cords or calf tenderness.  No significant calf/ankle edema.   Extremities: Normal, atraumatic, non-edematous   External genitalia: Normal female genitalia without lesion, discharge or tenderness.   Bimanual Exam: No cervical motion tenderness. Uterus 6-8 cm, retroverted, freely mobile. Normal size uterus. No adnexal masses. Some tenderness appreciated on palpation of posterior cul de sac.   Rectal Exam: Normal tone, no masses or tenderness; guaiac negative stool   Speculum Exam: Vaginal vault without discharge, nonodorous, no lesions/masses seen.  Cervical os visualized as closed, no lesions/masses.   Note: RN chaperone present for entirety of exam.    Results:     LABS  Trended:  Recent Labs   Lab 12/07/22  1613   WBC 11.0   HGB 11.4*   HCT 35.7*      MCV 85.0   RDW 14.3      K 3.5   CO2 23   BUN 7.8   CREATININE  0.70   CALCIUM 9.4   ALBUMIN 4.5   BILITOT 0.3   AST 11   ALT 9   ALKPHOS 86     Recent Results (from the past 24 hour(s))   Comprehensive Metabolic Panel    Collection Time: 12/07/22  4:13 PM   Result Value Ref Range    Sodium Level 139 136 - 145 mmol/L    Potassium Level 3.5 3.5 - 5.1 mmol/L    Chloride 105 98 - 107 mmol/L    Carbon Dioxide 23 22 - 29 mmol/L    Glucose Level 93 74 - 100 mg/dL    Blood Urea Nitrogen 7.8 7.0 - 18.7 mg/dL    Creatinine 0.70 0.55 - 1.02 mg/dL    Calcium Level Total 9.4 8.4 - 10.2 mg/dL    Protein Total 8.2 6.4 - 8.3 gm/dL    Albumin Level 4.5 3.5 - 5.0 gm/dL    Globulin 3.7 (H) 2.4 - 3.5 gm/dL    Albumin/Globulin Ratio 1.2 1.1 - 2.0 ratio    Bilirubin Total 0.3 <=1.5 mg/dL    Alkaline Phosphatase 86 40 - 150 unit/L    Alanine Aminotransferase 9 0 - 55 unit/L    Aspartate Aminotransferase 11 5 - 34 unit/L    eGFR >60 mls/min/1.73/m2   Lipase    Collection Time: 12/07/22  4:13 PM   Result Value Ref Range    Lipase Level 12 <=60 U/L   CBC with Differential    Collection Time: 12/07/22  4:13 PM   Result Value Ref Range    WBC 11.0 4.5 - 11.5 x10(3)/mcL    RBC 4.20 4.20 - 5.40 x10(6)/mcL    Hgb 11.4 (L) 12.0 - 16.0 gm/dL    Hct 35.7 (L) 37.0 - 47.0 %    MCV 85.0 80.0 - 94.0 fL    MCH 27.1 27.0 - 31.0 pg    MCHC 31.9 (L) 33.0 - 36.0 mg/dL    RDW 14.3 11.5 - 17.0 %    Platelet 290 130 - 400 x10(3)/mcL    MPV 11.7 (H) 7.4 - 10.4 fL    Neut % 66.1 %    Lymph % 23.3 %    Mono % 7.3 %    Eos % 2.5 %    Basophil % 0.5 %    Lymph # 2.56 0.6 - 4.6 x10(3)/mcL    Neut # 7.3 2.1 - 9.2 x10(3)/mcL    Mono # 0.80 0.1 - 1.3 x10(3)/mcL    Eos # 0.28 0 - 0.9 x10(3)/mcL    Baso # 0.05 0 - 0.2 x10(3)/mcL    IG# 0.03 0 - 0.04 x10(3)/mcL    IG% 0.3 %    NRBC% 0.0 %   Type & Screen    Collection Time: 12/07/22  4:13 PM   Result Value Ref Range    Group & Rh A NEG     Indirect Briseida GEL NEG    Urinalysis, Reflex to Urine Culture Urine, Clean Catch    Collection Time: 12/07/22  4:15 PM    Specimen: Urine   Result  Value Ref Range    Color, UA Light-Yellow Yellow, Light-Yellow, Dark Yellow, Itzel, Straw    Appearance, UA Clear Clear    Specific Gravity, UA 1.011     pH, UA 6.5 5.0 - 8.5    Protein, UA Negative Negative mg/dL    Glucose, UA Normal Negative, Normal mg/dL    Ketones, UA Negative Negative mg/dL    Blood, UA Negative Negative unit/L    Bilirubin, UA Negative Negative mg/dL    Urobilinogen, UA Normal 0.2, 1.0, Normal mg/dL    Nitrites, UA Negative Negative    Leukocyte Esterase, UA Negative Negative unit/L    WBC, UA 0-5 None Seen, 0-2, 3-5, 0-5 /HPF    Bacteria, UA None Seen None Seen /HPF    Squamous Epithelial Cells, UA Trace (A) None Seen /HPF    Mucous, UA Trace (A) None Seen /LPF    Hyaline Casts, UA None Seen None Seen /lpf    RBC, UA 0-5 None Seen, 0-2, 3-5, 0-5 /HPF   Chlamydia/GC, PCR    Collection Time: 12/07/22  4:15 PM    Specimen: Urine   Result Value Ref Range    Chlamydia trachomatis PCR Not Detected Not Detected    N. gonorrhea PCR Not Detected Not Detected   POCT urine pregnancy    Collection Time: 12/07/22  4:27 PM   Result Value Ref Range    POC Preg Test, Ur Negative Negative     Acceptable Yes       IMAGING  CT 12/2022  FINDINGS:  The lung bases are clear.     The liver appears normal.  No liver mass or lesion is seen.  Portal and hepatic veins appear normal.     The patient is status post cholecystectomy.     The pancreas appears normal.  No pancreatic mass or lesion is seen.     The spleen shows no acute abnormality.     The adrenal glands appear normal.  No adrenal nodule is seen.     The kidneys appear normal.  No hydronephrosis is seen.  No hydroureter is seen.  No nephrolithiasis is seen.  No obvious ureteral stones are seen.     Urinary bladder appears grossly unremarkable.     The appendix appears air-filled with no obvious inflammatory changes associated with it..     No colitis is seen.  No diverticulitis is seen.  No obvious colonic mass or lesion is seen.     There is  some free fluid in the pelvis.  The recent ultrasound showed multiple ovarian cysts.  Findings may represent the recent ovarian cyst rupture.     Impression:     Free fluid in the pelvis.  Recent ultrasound showed some ovarian cysts.  Findings may represent ovarian cyst rupture.     The appendix is seen and has air within it and appears unremarkable.    TV-US 2022  FINDINGS:  UTERUS/CERVIX: Retroverted  Size: 7.2 x 5.8 x 4.4 cm  Masses: None  Endometrium: 13 mm.  Trace fluid in the endometrial cavity.     RIGHT OVARY:  Size: 3.8 x 3 x 2.5 cm  Appearance: Normal sonographic appearance.  Right ovary corpus luteal cyst.  Vascular flow: Normal spectral waveforms.     LEFT OVARY:  Size: 2.4 x 2.3 x 1.4 cm  Appearance: Normal sonographic appearance.  Vascular Flow: Normal spectral waveforms.     FREE FLUID:  Small volume complex free fluid in the pelvis and right adnexa.     Impression:  Small volume complex free fluid in the pelvis.  Appearance is nonspecific but could be related to ruptured right ovarian corpus luteal cyst.     Assessment:     Royce Quigley is a 27 y.o.  female RLQ pain likely 2/2 right ruptured hemorrhagic corpus luteum. Pt is currently hemodynamically and clinically stable.    Recommendations:     RLQ Pain  - Pt slightly tachycardiac (low 100s) at presentation, now normotensive. BPs wnl  - H/H 11.4/35.7 - reassuring  - Imaging reviewed. 1-2cm right hemorrhagic corpus luteum noted. FF appreciated, likely 2/2 ruptured hemorrhagic corpus lutuem  - Physical exam unremarkable - soft abdomen, no rebound tenderness. Speculum exam unremarkable.   - Findings discussed at length with patient and reassurance provided. She is currently clinically stable with no indication for acute surgical intervention. Pt to return to ED if her symptoms significantly worsen.  - Pt desires to f/u with Primary GYN - [facility] at [facility] [place], [place]. Provided pt w/ our clinic number in case she  isn't able to get in touch w/ provider to schedule a f/u w/ us    Thank you for this consult. Pt stable for discharge from a GYN standpoint at this time. Please call if you have any questions/concerns.    Discussed with staff, Dr. Santamaria.    Rudi Kaur MD  [facility] -  PGY2    Christopher Miramontes MD, MPH  [facility] Obstetrics & Gynecology -  PGY4  Pager: [phone]  7:53 PM 12/07/2022

## 2022-12-08 NOTE — ED NOTES
Pt given discharge instructions. Pt instructed to follow up with pcp and return to er if any complications. (Bleeding or worsening pain, fever, etc.) pt and partner stated understanding.

## 2022-12-13 ENCOUNTER — PATIENT OUTREACH (OUTPATIENT)
Dept: EMERGENCY MEDICINE | Facility: HOSPITAL | Age: 27
End: 2022-12-13
Payer: MEDICAID

## 2022-12-13 NOTE — PROGRESS NOTES
tried again to verify calling correct #, same message saying the # you are calling is not in service, no alt # listed. closed encounter due to unable to reach pt invalid number.

## 2023-01-19 ENCOUNTER — OFFICE VISIT (OUTPATIENT)
Dept: ENDOCRINOLOGY | Facility: CLINIC | Age: 28
End: 2023-01-19
Payer: MEDICAID

## 2023-01-19 VITALS
DIASTOLIC BLOOD PRESSURE: 61 MMHG | HEART RATE: 101 BPM | SYSTOLIC BLOOD PRESSURE: 85 MMHG | RESPIRATION RATE: 20 BRPM | BODY MASS INDEX: 21.95 KG/M2 | WEIGHT: 111.81 LBS | TEMPERATURE: 98 F | HEIGHT: 60 IN

## 2023-01-19 DIAGNOSIS — R73.01 IMPAIRED FASTING BLOOD SUGAR: ICD-10-CM

## 2023-01-19 DIAGNOSIS — E04.2 MULTINODULAR GOITER: ICD-10-CM

## 2023-01-19 DIAGNOSIS — E05.00 GRAVES DISEASE: Primary | ICD-10-CM

## 2023-01-19 LAB
EST. AVERAGE GLUCOSE BLD GHB EST-MCNC: 91.1 MG/DL
HBA1C MFR BLD: 4.8 %
T3FREE SERPL-MCNC: 3.05 PG/ML (ref 1.57–3.91)
T4 FREE SERPL-MCNC: 1 NG/DL (ref 0.7–1.48)
TSH SERPL-ACNC: 2.77 UIU/ML (ref 0.35–4.94)

## 2023-01-19 PROCEDURE — 3074F PR MOST RECENT SYSTOLIC BLOOD PRESSURE < 130 MM HG: ICD-10-PCS | Mod: CPTII,,, | Performed by: NURSE PRACTITIONER

## 2023-01-19 PROCEDURE — 3008F PR BODY MASS INDEX (BMI) DOCUMENTED: ICD-10-PCS | Mod: CPTII,,, | Performed by: NURSE PRACTITIONER

## 2023-01-19 PROCEDURE — 36415 COLL VENOUS BLD VENIPUNCTURE: CPT | Performed by: NURSE PRACTITIONER

## 2023-01-19 PROCEDURE — 1160F RVW MEDS BY RX/DR IN RCRD: CPT | Mod: CPTII,,, | Performed by: NURSE PRACTITIONER

## 2023-01-19 PROCEDURE — 86341 ISLET CELL ANTIBODY: CPT | Performed by: NURSE PRACTITIONER

## 2023-01-19 PROCEDURE — 84439 ASSAY OF FREE THYROXINE: CPT | Performed by: NURSE PRACTITIONER

## 2023-01-19 PROCEDURE — 99214 OFFICE O/P EST MOD 30 MIN: CPT | Mod: S$PBB,,, | Performed by: NURSE PRACTITIONER

## 2023-01-19 PROCEDURE — 99214 PR OFFICE/OUTPT VISIT, EST, LEVL IV, 30-39 MIN: ICD-10-PCS | Mod: S$PBB,,, | Performed by: NURSE PRACTITIONER

## 2023-01-19 PROCEDURE — 3008F BODY MASS INDEX DOCD: CPT | Mod: CPTII,,, | Performed by: NURSE PRACTITIONER

## 2023-01-19 PROCEDURE — 1159F MED LIST DOCD IN RCRD: CPT | Mod: CPTII,,, | Performed by: NURSE PRACTITIONER

## 2023-01-19 PROCEDURE — 3074F SYST BP LT 130 MM HG: CPT | Mod: CPTII,,, | Performed by: NURSE PRACTITIONER

## 2023-01-19 PROCEDURE — 3078F PR MOST RECENT DIASTOLIC BLOOD PRESSURE < 80 MM HG: ICD-10-PCS | Mod: CPTII,,, | Performed by: NURSE PRACTITIONER

## 2023-01-19 PROCEDURE — 1160F PR REVIEW ALL MEDS BY PRESCRIBER/CLIN PHARMACIST DOCUMENTED: ICD-10-PCS | Mod: CPTII,,, | Performed by: NURSE PRACTITIONER

## 2023-01-19 PROCEDURE — 84481 FREE ASSAY (FT-3): CPT | Performed by: NURSE PRACTITIONER

## 2023-01-19 PROCEDURE — 84681 ASSAY OF C-PEPTIDE: CPT | Performed by: NURSE PRACTITIONER

## 2023-01-19 PROCEDURE — 84443 ASSAY THYROID STIM HORMONE: CPT | Performed by: NURSE PRACTITIONER

## 2023-01-19 PROCEDURE — 83036 HEMOGLOBIN GLYCOSYLATED A1C: CPT | Performed by: NURSE PRACTITIONER

## 2023-01-19 PROCEDURE — 3078F DIAST BP <80 MM HG: CPT | Mod: CPTII,,, | Performed by: NURSE PRACTITIONER

## 2023-01-19 PROCEDURE — 1159F PR MEDICATION LIST DOCUMENTED IN MEDICAL RECORD: ICD-10-PCS | Mod: CPTII,,, | Performed by: NURSE PRACTITIONER

## 2023-01-19 PROCEDURE — 99214 OFFICE O/P EST MOD 30 MIN: CPT | Mod: PBBFAC | Performed by: NURSE PRACTITIONER

## 2023-01-19 RX ORDER — FLUTICASONE PROPIONATE 50 MCG
2 SPRAY, SUSPENSION (ML) NASAL
COMMUNITY
Start: 2022-08-01 | End: 2023-11-28

## 2023-01-19 RX ORDER — OMEPRAZOLE 40 MG/1
40 CAPSULE, DELAYED RELEASE ORAL
COMMUNITY
Start: 2023-01-09 | End: 2023-11-28

## 2023-01-19 RX ORDER — FERROUS SULFATE TAB 325 MG (65 MG ELEMENTAL FE) 325 (65 FE) MG
TAB ORAL
COMMUNITY
Start: 2022-11-23

## 2023-01-19 RX ORDER — SOD SULF/POT CHLORIDE/MAG SULF 1.479 G
TABLET ORAL
COMMUNITY
Start: 2022-08-30 | End: 2023-11-28

## 2023-01-19 NOTE — PROGRESS NOTES
Subjective:       Patient ID: Royce Quigley is a 27 y.o. female.    Chief Complaint: graves disease follow up    Previous Endocrine Clinic Note:      10/15/2020 Telemedicine Visit Note- Endocrine Clinic: 24 y/o female schedule for endocrine clinic F/U for Graves disease. Pt is 7 weeks post-partum and currently on MMI 5mg daily. TSH 0.043 Free T4 1.20 T4 9.1.  Patient reports a couple of weeks after pregnancy and she felt like she was hyperthyroid and had an old prescription of MMI at home and started on 5 mg, then she states 3 weeks ago she went to her primary care provider and was prescribed a new prescription of MMI 5 mg.  She states she has been on the new prescription that is not  for approximately 3 weeks.  She reports that her symptoms of palpitations, tremors and anxiety have slightly improved but she still continues with palpitations, weight loss.     2021 Endocrine Clinic Note: 26 year old female scheduled today's endocrine clinic follow-up for Graves' disease.  Patient was previously in remission with pregnancy and delivered 2020.  4 to 5 weeks after pregnancy patient felt like she was returning to hyperthyroidism and had restarted her MMI. Last documentation 2020 patient's TSH was 10.521 MMI 10 mg twice a day was reduced to once a day. She is currently MMI 5mg (1/2 of 10mg tablet), She reports she was seeing a health care provider in [place] who changed her medication. Pt states she feel speedy now, with palpitations, anxiety. Pt weight has increased. Will check labs today. (1)     2021 Endocrine Clinic Note: 26 year old female scheduled today for Endocrine follow-up.  History of Graves' disease/palpitations.  Patient has continued her MMI 5 mg and states when she ran out she had an old prescription that was 2-3 years old that she restarted she states she is severely worried about going back into hyperthyroidism.  Current labs TSH 0.855, free T4 1.0015 mg of MMI.   Discussed with patient holding MMI to assess for remission patient states that she has a great fear from returning to hyperthyroidism and she is currently symptomatic with weight loss and palpitations and does not want to stop her MMI.  Instructed patient we will decrease to a half of a tablet and recheck labs in 3 weeks and to reconsider holding MMI.  Tobacco use patient smokes half a pack per day encourage cessation. (1)     3/15/2022 endocrine clinic note: 27-year-old female scheduled today for endocrine clinic follow-up. History of Graves' disease/palpitations. Graves' disease patient is currently in MMI 5 mg most recent TSH On 01/25/2022 TSH 2.2292.  Patient reports continued palpitations, excessive sweating and reports weight loss.  Patient also has a history of anxiety.  Patient is currently undergoing a cardiac work-up for palpitations and also was referred to gastro for an upper scope but has not completed yet. Enlarged thyroid multinodular goiter patient had ultrasound 6/28/2019 IMPRESSION: Heterogenicity in hypervascularity of the thyroid gland might be related to thyroid disease. Subcentimeter nodules as above do not meet criteria for biopsy.      Endocrine Clinic Note 07/19/2022: 27-year-old female scheduled today for endocrine clinic follow-up. History of Graves disease, multinodular goiter, enlarged thyroid, palpitations. Previous TSH 2.2292 on 01/25/2022 patient was previously held off MI and reported symptoms of palpitation, increased anxiety and tremors. Currently she is on MMI 5 mg. Palpitations patient reports occasional palpitations she states when she went to her PCP 1 week ago her  today patient's heart rate is 92 patient states compliance with her propanolol. Multinodular goiter/enlarged thyroid repeat ultrasound 03/23/2022 Mild diffuse heterogeneity and hypervascularity at the thyroid gland suggesting diffuse thyroiditis with several small benign sub centimeter nodules as detailed  above. No significant interval change compared to the thyroid ultrasound in 2019 aside from interval decrease in size of the left thyroid nodule.  Patient denies any new palpable nodules but does report mild dysphagia at times. Tobacco use patient is smoking 1 pack per day states she does not want states patient's program at this time she states she is not want to quit due to her anxiety.      Current Endocrine Clinic Note 01/19/2023:  27-year-old female scheduled today for endocrine clinic follow-up.  History of Graves disease, multinodular goiter and enlarged thyroid, palpitations. TSH 2.0166, Free T3 2.98, Free T4 0.91 on 07/19/2022 patient states when MMI stops she continue with palpitations and extreme anxiety increased symptoms of hyperthyroidism.  Patient remained on MMI 5 mg reports to clinic today and reports occasional palpitations and anxiety.  Patient's low blood pressure today for took a Xanax just prior to her visit along with propranolol 10 mg last night.  Patient currently being followed by cardiologist.  Multinodular goiter repeat ultrasound 03/23/2022 IMPRESSION: Mild diffuse heterogeneity and hypervascularity at the thyroid gland suggesting diffuse thyroiditis with several small benign subcentimeter nodules as detailed above. No significant interval change compared to the thyroid ultrasound in 2019 aside from interval decrease in size of the left thyroid nodule.  Patient is concerned developing type 1 diabetes for grandmother has a history of type 1 diabetes and discussing today like she is pancreatic insufficiency.  Patient's recent glucose of 139 previous glucoses were within normal limits.      Narrative & Impression  Thyroid ultrasound     INDICATION: 27-year-old woman with enlarged thyroid gland/nodules.     TECHNIQUE: Real-time grayscale and color Doppler sonographic  evaluation of the thyroid gland was performed per routine protocol.     COMPARISON: Thyroid ultrasound 6/28/2019.      FINDINGS:     There is mild enlargement at the right thyroid lobe with the gland  otherwise normal and size and contour. The right thyroid lobe measures  5.4 x 1.6 x 1.9 cm and the left lobe 4.5 x 1.6 x 1.4 cm. The isthmus  measures just under 0.2 cm in AP thickness. A small colloid cyst at  the right thyroid lobe measures 5 x 3 x 4 mm and a second small and  well marginated solid hypoechoic nodule at the right posterior thyroid  lobe 5 x 3 x 5 mm. A single small colloid cyst in the left thyroid  lobe measures 3 x 2 x 3 mm. There is mild diffuse heterogeneity of the  thyroid gland parenchyma along with diffuse hypervascularity at the  gland.     IMPRESSION:     Mild diffuse heterogeneity and hypervascularity at the thyroid gland  suggesting diffuse thyroiditis with several small benign subcentimeter  nodules as detailed above. No significant interval change compared to  the thyroid ultrasound in 2019 aside from interval decrease in size of  the left thyroid nodule.  Electronically Signed By: Erasmo Pa MD  Date/Time Signed: 03/23/2022 16:27     Specimen Collected: 03/23/22 14:13 Last Resulted: 03/23/22 14:13          Review of Systems   Constitutional:  Positive for fatigue. Negative for activity change and appetite change.   HENT:  Positive for goiter. Negative for dental problem, hearing loss, tinnitus and trouble swallowing.    Eyes:  Negative for photophobia, pain and visual disturbance.   Respiratory:  Negative for cough, chest tightness and wheezing.    Cardiovascular:  Negative for chest pain, palpitations and leg swelling.   Gastrointestinal:  Positive for abdominal pain and diarrhea. Negative for constipation, nausea and reflux.   Endocrine: Negative for cold intolerance, heat intolerance, polydipsia and polyphagia.   Genitourinary:  Negative for difficulty urinating, flank pain, hematuria, hot flashes, menstrual irregularity, menstrual problem, nocturia and urgency.   Musculoskeletal:  Negative for  back pain, gait problem, joint swelling, leg pain and joint deformity.   Integumentary:  Negative for color change, pallor, rash and breast discharge.   Allergic/Immunologic: Positive for immunocompromised state. Negative for environmental allergies and food allergies.   Neurological:  Positive for weakness and light-headedness. Negative for tremors, seizures, headaches, coordination difficulties, memory loss and coordination difficulties.   Psychiatric/Behavioral:  Negative for agitation, behavioral problems and sleep disturbance. The patient is nervous/anxious.        Objective:      Physical Exam  Constitutional:       General: She is not in acute distress.     Appearance: Normal appearance. She is not ill-appearing.   HENT:      Head: Normocephalic and atraumatic.      Right Ear: External ear normal.      Left Ear: External ear normal.      Nose: Nose normal. No congestion or rhinorrhea.      Mouth/Throat:      Mouth: Mucous membranes are moist.      Pharynx: Oropharynx is clear. No oropharyngeal exudate.   Eyes:      General:         Right eye: No discharge.         Left eye: No discharge.      Conjunctiva/sclera: Conjunctivae normal.      Pupils: Pupils are equal, round, and reactive to light.   Neck:      Thyroid: No thyroid mass, thyromegaly or thyroid tenderness.   Cardiovascular:      Rate and Rhythm: Normal rate and regular rhythm.      Pulses: Normal pulses.      Heart sounds: Normal heart sounds. No murmur heard.  Pulmonary:      Effort: Pulmonary effort is normal. No respiratory distress.      Breath sounds: Normal breath sounds.   Abdominal:      General: Abdomen is flat. Bowel sounds are normal. There is no distension.      Palpations: Abdomen is soft.      Tenderness: There is no abdominal tenderness.   Musculoskeletal:         General: No swelling or tenderness. Normal range of motion.      Cervical back: Normal range of motion and neck supple. No tenderness.      Right lower leg: No edema.       Left lower leg: No edema.   Feet:      Right foot:      Skin integrity: Skin integrity normal.      Left foot:      Skin integrity: Skin integrity normal.   Lymphadenopathy:      Cervical: No cervical adenopathy.   Skin:     General: Skin is warm and dry.      Coloration: Skin is not jaundiced or pale.   Neurological:      General: No focal deficit present.      Mental Status: She is alert and oriented to person, place, and time. Mental status is at baseline.      Coordination: Coordination normal.      Gait: Gait normal.   Psychiatric:         Mood and Affect: Mood normal.         Behavior: Behavior normal.         Thought Content: Thought content normal.       Assessment:       Problem List Items Addressed This Visit          Endocrine    Graves disease - Primary    Relevant Orders    T4, Free    T3, Free ([facility])    TSH     Other Visit Diagnoses       Multinodular goiter        Impaired fasting blood sugar        Relevant Orders    Glutamic Acid Decarboxylase    C-Peptide    Hemoglobin A1C            Plan:       Graves disease  On MMI 5 mg   TSH 2.0166, Free T3 2.98, Free T4 0.91 on 07/19/2022   Repeat labs today TSH, Free T4, Free T3 today   -     T4, Free; Future; Expected date: 01/19/2023  -     T3, Free ([facility]); Future; Expected date: 01/19/2023  -     TSH; Future; Expected date: 01/19/2023    Component Ref Range & Units 6 mo ago   (7/19/22) 11 mo ago   (1/25/22) 1 yr ago   (12/19/21) 1 yr ago   (9/30/21) 1 yr ago   (9/16/21) 1 yr ago   (5/5/21) 1 yr ago   (3/22/21)   Thyroid Stimulating Hormone 0.3500 - 4.9400 uIU/mL 2.0166  2.2292  1.2144  1.5176  1.6657  5.7193 High   7.0031 High       Component Ref Range & Units 6 mo ago   (7/19/22) 1 yr ago   (3/22/21) 2 yr ago   (3/23/20) 2 yr ago   (2/20/20) 2 yr ago   (1/28/20) 3 yr ago   (1/14/20) 3 yr ago   (1/1/20)   T3 Free 1.57 - 3.91 pg/mL 2.98  3.02 R  2.61 R  2.90 R  2.76 R  3.43 R  3.15 R      Component Ref Range & Units 6 mo ago   (7/19/22) 1 yr ago   (12/19/21)  1 yr ago   (9/30/21) 1 yr ago   (9/16/21) 1 yr ago   (5/5/21) 1 yr ago   (3/22/21) 2 yr ago   (7/19/20)   Thyroxine Free 0.70 - 1.48 ng/dL 0.91  0.91  1.00  0.97  0.75  0.72  0.98 R      Multinodular goiter  US 03/23/2022 stable no changes     Impaired fasting blood sugar  Glucose 139   Family Hx of Type 1   -     Glutamic Acid Decarboxylase; Future; Expected date: 01/19/2023  -     C-Peptide; Future; Expected date: 01/19/2023  -     Hemoglobin A1C; Future; Expected date: 01/19/2023      I spent a total of 30 minutes on the day of the visit.  This includes face to face time and non-face to face time preparing to see the patient (eg, review of tests), obtaining and/or reviewing separately obtained history, documenting clinical information in the electronic or other health record, independently interpreting results and communicating results to the patient/family/caregiver, or care coordinator.

## 2023-01-20 LAB — C PEPTIDE P FAST SERPL-MCNC: 1.6 NG/ML (ref 1.1–4.4)

## 2023-01-23 LAB — GAD65 AB SER-SCNC: 0 NMOL/L

## 2023-02-25 ENCOUNTER — PATIENT MESSAGE (OUTPATIENT)
Dept: ENDOCRINOLOGY | Facility: CLINIC | Age: 28
End: 2023-02-25
Payer: MEDICAID

## 2023-05-05 ENCOUNTER — PATIENT MESSAGE (OUTPATIENT)
Dept: ENDOCRINOLOGY | Facility: CLINIC | Age: 28
End: 2023-05-05
Payer: MEDICAID

## 2023-07-24 ENCOUNTER — OFFICE VISIT (OUTPATIENT)
Dept: ENDOCRINOLOGY | Facility: CLINIC | Age: 28
End: 2023-07-24
Payer: MEDICAID

## 2023-07-24 VITALS
RESPIRATION RATE: 17 BRPM | DIASTOLIC BLOOD PRESSURE: 69 MMHG | HEIGHT: 60 IN | TEMPERATURE: 98 F | WEIGHT: 110 LBS | SYSTOLIC BLOOD PRESSURE: 103 MMHG | HEART RATE: 96 BPM | BODY MASS INDEX: 21.6 KG/M2

## 2023-07-24 DIAGNOSIS — R00.2 PALPITATION: ICD-10-CM

## 2023-07-24 DIAGNOSIS — E04.2 MULTINODULAR GOITER: ICD-10-CM

## 2023-07-24 DIAGNOSIS — R59.9 LYMPH NODE ENLARGEMENT: ICD-10-CM

## 2023-07-24 DIAGNOSIS — E05.00 GRAVES DISEASE: Primary | ICD-10-CM

## 2023-07-24 LAB
T3FREE SERPL-MCNC: 2.87 PG/ML (ref 1.58–3.91)
T4 FREE SERPL-MCNC: 0.79 NG/DL (ref 0.7–1.48)
TSH SERPL-ACNC: 4.62 UIU/ML (ref 0.35–4.94)

## 2023-07-24 PROCEDURE — 1159F MED LIST DOCD IN RCRD: CPT | Mod: CPTII,,, | Performed by: NURSE PRACTITIONER

## 2023-07-24 PROCEDURE — 1160F RVW MEDS BY RX/DR IN RCRD: CPT | Mod: CPTII,,, | Performed by: NURSE PRACTITIONER

## 2023-07-24 PROCEDURE — 99214 OFFICE O/P EST MOD 30 MIN: CPT | Mod: PBBFAC | Performed by: NURSE PRACTITIONER

## 2023-07-24 PROCEDURE — 3044F HG A1C LEVEL LT 7.0%: CPT | Mod: CPTII,,, | Performed by: NURSE PRACTITIONER

## 2023-07-24 PROCEDURE — 1159F PR MEDICATION LIST DOCUMENTED IN MEDICAL RECORD: ICD-10-PCS | Mod: CPTII,,, | Performed by: NURSE PRACTITIONER

## 2023-07-24 PROCEDURE — 83520 IMMUNOASSAY QUANT NOS NONAB: CPT | Performed by: NURSE PRACTITIONER

## 2023-07-24 PROCEDURE — 3044F PR MOST RECENT HEMOGLOBIN A1C LEVEL <7.0%: ICD-10-PCS | Mod: CPTII,,, | Performed by: NURSE PRACTITIONER

## 2023-07-24 PROCEDURE — 3078F PR MOST RECENT DIASTOLIC BLOOD PRESSURE < 80 MM HG: ICD-10-PCS | Mod: CPTII,,, | Performed by: NURSE PRACTITIONER

## 2023-07-24 PROCEDURE — 99214 PR OFFICE/OUTPT VISIT, EST, LEVL IV, 30-39 MIN: ICD-10-PCS | Mod: S$PBB,,, | Performed by: NURSE PRACTITIONER

## 2023-07-24 PROCEDURE — 1160F PR REVIEW ALL MEDS BY PRESCRIBER/CLIN PHARMACIST DOCUMENTED: ICD-10-PCS | Mod: CPTII,,, | Performed by: NURSE PRACTITIONER

## 2023-07-24 PROCEDURE — 84481 FREE ASSAY (FT-3): CPT | Performed by: NURSE PRACTITIONER

## 2023-07-24 PROCEDURE — 86376 MICROSOMAL ANTIBODY EACH: CPT | Performed by: NURSE PRACTITIONER

## 2023-07-24 PROCEDURE — 3074F PR MOST RECENT SYSTOLIC BLOOD PRESSURE < 130 MM HG: ICD-10-PCS | Mod: CPTII,,, | Performed by: NURSE PRACTITIONER

## 2023-07-24 PROCEDURE — 3008F PR BODY MASS INDEX (BMI) DOCUMENTED: ICD-10-PCS | Mod: CPTII,,, | Performed by: NURSE PRACTITIONER

## 2023-07-24 PROCEDURE — 99214 OFFICE O/P EST MOD 30 MIN: CPT | Mod: S$PBB,,, | Performed by: NURSE PRACTITIONER

## 2023-07-24 PROCEDURE — 3008F BODY MASS INDEX DOCD: CPT | Mod: CPTII,,, | Performed by: NURSE PRACTITIONER

## 2023-07-24 PROCEDURE — 3078F DIAST BP <80 MM HG: CPT | Mod: CPTII,,, | Performed by: NURSE PRACTITIONER

## 2023-07-24 PROCEDURE — 3074F SYST BP LT 130 MM HG: CPT | Mod: CPTII,,, | Performed by: NURSE PRACTITIONER

## 2023-07-24 PROCEDURE — 36415 COLL VENOUS BLD VENIPUNCTURE: CPT | Performed by: NURSE PRACTITIONER

## 2023-07-24 PROCEDURE — 84439 ASSAY OF FREE THYROXINE: CPT | Performed by: NURSE PRACTITIONER

## 2023-07-24 PROCEDURE — 84443 ASSAY THYROID STIM HORMONE: CPT | Performed by: NURSE PRACTITIONER

## 2023-07-24 PROCEDURE — 84445 ASSAY OF TSI GLOBULIN: CPT | Performed by: NURSE PRACTITIONER

## 2023-07-24 RX ORDER — CLINDAMYCIN HYDROCHLORIDE 150 MG/1
150 CAPSULE ORAL EVERY 6 HOURS
COMMUNITY
End: 2023-11-28

## 2023-07-24 NOTE — PROGRESS NOTES
Subjective     Patient ID: Royce Quigley is a 28 y.o. female.    Chief Complaint: Graves' Disease       Previous Endocrine Clinic Note:      10/15/2020 Telemedicine Visit Note- Endocrine Clinic: 26 y/o female schedule for endocrine clinic F/U for Graves disease. Pt is 7 weeks post-partum and currently on MMI 5mg daily. TSH 0.043 Free T4 1.20 T4 9.1.  Patient reports a couple of weeks after pregnancy and she felt like she was hyperthyroid and had an old prescription of MMI at home and started on 5 mg, then she states 3 weeks ago she went to her primary care provider and was prescribed a new prescription of MMI 5 mg.  She states she has been on the new prescription that is not  for approximately 3 weeks.  She reports that her symptoms of palpitations, tremors and anxiety have slightly improved but she still continues with palpitations, weight loss.     2021 Endocrine Clinic Note: 26 year old female scheduled today's endocrine clinic follow-up for Graves' disease.  Patient was previously in remission with pregnancy and delivered 2020.  4 to 5 weeks after pregnancy patient felt like she was returning to hyperthyroidism and had restarted her MMI. Last documentation 2020 patient's TSH was 10.521 MMI 10 mg twice a day was reduced to once a day. She is currently MMI 5mg (1/2 of 10mg tablet), She reports she was seeing a health care provider in [place] who changed her medication. Pt states she feel speedy now, with palpitations, anxiety. Pt weight has increased. Will check labs today. (1)     2021 Endocrine Clinic Note: 26 year old female scheduled today for Endocrine follow-up.  History of Graves' disease/palpitations.  Patient has continued her MMI 5 mg and states when she ran out she had an old prescription that was 2-3 years old that she restarted she states she is severely worried about going back into hyperthyroidism.  Current labs TSH 0.855, free T4 1.0015 mg of MMI.  Discussed  with patient holding MMI to assess for remission patient states that she has a great fear from returning to hyperthyroidism and she is currently symptomatic with weight loss and palpitations and does not want to stop her MMI.  Instructed patient we will decrease to a half of a tablet and recheck labs in 3 weeks and to reconsider holding MMI.  Tobacco use patient smokes half a pack per day encourage cessation. (1)     3/15/2022 endocrine clinic note: 27-year-old female scheduled today for endocrine clinic follow-up. History of Graves' disease/palpitations. Graves' disease patient is currently in MMI 5 mg most recent TSH On 01/25/2022 TSH 2.2292.  Patient reports continued palpitations, excessive sweating and reports weight loss.  Patient also has a history of anxiety.  Patient is currently undergoing a cardiac work-up for palpitations and also was referred to gastro for an upper scope but has not completed yet. Enlarged thyroid multinodular goiter patient had ultrasound 6/28/2019 IMPRESSION: Heterogenicity in hypervascularity of the thyroid gland might be related to thyroid disease. Subcentimeter nodules as above do not meet criteria for biopsy.      Endocrine Clinic Note 07/19/2022: 27-year-old female scheduled today for endocrine clinic follow-up. History of Graves disease, multinodular goiter, enlarged thyroid, palpitations. Previous TSH 2.2292 on 01/25/2022 patient was previously held off MI and reported symptoms of palpitation, increased anxiety and tremors. Currently she is on MMI 5 mg. Palpitations patient reports occasional palpitations she states when she went to her PCP 1 week ago her  today patient's heart rate is 92 patient states compliance with her propanolol. Multinodular goiter/enlarged thyroid repeat ultrasound 03/23/2022 Mild diffuse heterogeneity and hypervascularity at the thyroid gland suggesting diffuse thyroiditis with several small benign sub centimeter nodules as detailed above. No  significant interval change compared to the thyroid ultrasound in 2019 aside from interval decrease in size of the left thyroid nodule.  Patient denies any new palpable nodules but does report mild dysphagia at times. Tobacco use patient is smoking 1 pack per day states she does not want states patient's program at this time she states she is not want to quit due to her anxiety.     Endocrine Clinic Note 01/19/2023:  27-year-old female scheduled today for endocrine clinic follow-up.  History of Graves disease, multinodular goiter and enlarged thyroid, palpitations. TSH 2.0166, Free T3 2.98, Free T4 0.91 on 07/19/2022 patient states when MMI stops she continue with palpitations and extreme anxiety increased symptoms of hyperthyroidism.  Patient remained on MMI 5 mg reports to clinic today and reports occasional palpitations and anxiety.  Patient's low blood pressure today for took a Xanax just prior to her visit along with propranolol 10 mg last night.  Patient currently being followed by cardiologist.  Multinodular goiter repeat ultrasound 03/23/2022 IMPRESSION: Mild diffuse heterogeneity and hypervascularity at the thyroid gland suggesting diffuse thyroiditis with several small benign subcentimeter nodules as detailed above. No significant interval change compared to the thyroid ultrasound in 2019 aside from interval decrease in size of the left thyroid nodule.  Patient is concerned developing type 1 diabetes for grandmother has a history of type 1 diabetes and discussing today like she is pancreatic insufficiency.  Patient's recent glucose of 139 previous glucoses were within normal limits.     Endocrine clinic note 07/24/2023:  28-year-old female day endocrine clinic follow-up.  History of Graves disease, multinodular goiter enlarged thyroid, palpitations.  TSH 2.769, Free T4 1.00, Free T3 3.05 on 01/19/2023.  Patient is currently on MMI 5 mg Patient states when dose is lowered she has symptoms of palpitations  symptoms of hyperthyroidism.  Multinodular goiter ultrasound 03/23/2023 IMPRESSION: Mild diffuse heterogeneity and hypervascularity at the thyroid gland suggesting diffuse thyroiditis with several small benign subcentimeter nodules as detailed above. No significant interval change compared to the thyroid ultrasound in 2019 aside from interval decrease in size of the left thyroid nodule.  Patient denies any palpable nodules but she does note palpable lymph node close to her right ear that has been palpable she states for over a year nontender.               Narrative & Impression  Thyroid ultrasound     INDICATION: 27-year-old woman with enlarged thyroid gland/nodules.     TECHNIQUE: Real-time grayscale and color Doppler sonographic  evaluation of the thyroid gland was performed per routine protocol.     COMPARISON: Thyroid ultrasound 6/28/2019.     FINDINGS:     There is mild enlargement at the right thyroid lobe with the gland  otherwise normal and size and contour. The right thyroid lobe measures  5.4 x 1.6 x 1.9 cm and the left lobe 4.5 x 1.6 x 1.4 cm. The isthmus  measures just under 0.2 cm in AP thickness. A small colloid cyst at  the right thyroid lobe measures 5 x 3 x 4 mm and a second small and  well marginated solid hypoechoic nodule at the right posterior thyroid  lobe 5 x 3 x 5 mm. A single small colloid cyst in the left thyroid  lobe measures 3 x 2 x 3 mm. There is mild diffuse heterogeneity of the  thyroid gland parenchyma along with diffuse hypervascularity at the  gland.     IMPRESSION:     Mild diffuse heterogeneity and hypervascularity at the thyroid gland  suggesting diffuse thyroiditis with several small benign subcentimeter  nodules as detailed above. No significant interval change compared to  the thyroid ultrasound in 2019 aside from interval decrease in size of  the left thyroid nodule.  Electronically Signed By: Erasmo Pa MD  Date/Time Signed: 03/23/2022 16:27     Specimen Collected:  03/23/22 14:13 Last Resulted: 03/23/22 14:13             Review of Systems   Constitutional:  Negative for activity change, appetite change and fatigue.   HENT:  Positive for dental problem and ear pain. Negative for hearing loss, tinnitus, trouble swallowing and goiter.    Eyes:  Negative for photophobia, pain and visual disturbance.   Respiratory:  Negative for cough, chest tightness and wheezing.    Cardiovascular:  Negative for chest pain, palpitations and leg swelling.   Gastrointestinal:  Negative for abdominal pain, constipation, diarrhea, nausea and reflux.   Endocrine: Negative for cold intolerance, heat intolerance, polydipsia and polyphagia.   Genitourinary:  Negative for difficulty urinating, flank pain, hematuria, hot flashes, menstrual irregularity, menstrual problem, nocturia and urgency.   Musculoskeletal:  Negative for back pain, gait problem, joint swelling, leg pain and joint deformity.   Integumentary:  Negative for color change, pallor, rash and breast discharge.   Allergic/Immunologic: Negative for environmental allergies, food allergies and immunocompromised state.   Neurological:  Negative for tremors, seizures, headaches, coordination difficulties, memory loss and coordination difficulties.   Psychiatric/Behavioral:  Negative for agitation, behavioral problems and sleep disturbance. The patient is not nervous/anxious.         Objective     Physical Exam  Constitutional:       General: She is not in acute distress.     Appearance: Normal appearance. She is not ill-appearing.   HENT:      Head: Normocephalic and atraumatic.      Right Ear: External ear normal.      Left Ear: External ear normal.      Nose: Nose normal. No congestion or rhinorrhea.      Mouth/Throat:      Mouth: Mucous membranes are moist.      Pharynx: Oropharynx is clear. No oropharyngeal exudate.   Eyes:      General:         Right eye: No discharge.         Left eye: No discharge.      Conjunctiva/sclera: Conjunctivae  normal.      Pupils: Pupils are equal, round, and reactive to light.   Neck:      Thyroid: Thyromegaly present. No thyroid mass or thyroid tenderness.   Cardiovascular:      Rate and Rhythm: Normal rate and regular rhythm.      Pulses: Normal pulses.      Heart sounds: Normal heart sounds. No murmur heard.  Pulmonary:      Effort: Pulmonary effort is normal. No respiratory distress.      Breath sounds: Normal breath sounds.   Abdominal:      General: Abdomen is flat. Bowel sounds are normal. There is no distension.      Palpations: Abdomen is soft.      Tenderness: There is no abdominal tenderness.   Musculoskeletal:         General: No swelling or tenderness. Normal range of motion.      Cervical back: Normal range of motion and neck supple. No tenderness.      Right lower leg: No edema.      Left lower leg: No edema.   Feet:      Right foot:      Skin integrity: Skin integrity normal.      Left foot:      Skin integrity: Skin integrity normal.   Lymphadenopathy:      Cervical: Cervical adenopathy present.   Skin:     General: Skin is warm and dry.      Coloration: Skin is not jaundiced or pale.   Neurological:      General: No focal deficit present.      Mental Status: She is alert and oriented to person, place, and time. Mental status is at baseline.      Coordination: Coordination normal.      Gait: Gait normal.   Psychiatric:         Mood and Affect: Mood normal.         Behavior: Behavior normal.         Thought Content: Thought content normal.          Assessment and Plan     1. Graves disease  On MMI 5 mg   TSH 2.769, Free T4 1.00, Free T3 3.05 on 01/19/2023   Repeat labs today TSH, free T4, free T3, TSI and TrAB   Return to clinic in 4 months  Component Ref Range & Units 6 mo ago  (1/19/23) 1 yr ago  (7/19/22) 1 yr ago  (1/25/22) 1 yr ago  (12/19/21) 1 yr ago  (9/30/21) 1 yr ago  (9/16/21) 2 yr ago  (5/5/21)   Thyroid Stimulating Hormone 0.350 - 4.940 uIU/mL 2.769  2.0166 R  2.2292 R  1.2144 R  1.5176 R   1.6657 R  5.7193 High  R      Component Ref Range & Units 6 mo ago  (1/19/23) 1 yr ago  (7/19/22) 2 yr ago  (3/22/21) 3 yr ago  (3/23/20) 3 yr ago  (2/20/20) 3 yr ago  (1/28/20) 3 yr ago  (1/14/20)   T3 Free 1.57 - 3.91 pg/mL 3.05  2.98  3.02 R  2.61 R  2.90 R  2.76 R  3.43 R      Component Ref Range & Units 6 mo ago  (1/19/23) 1 yr ago  (7/19/22) 1 yr ago  (12/19/21) 1 yr ago  (9/30/21) 1 yr ago  (9/16/21) 2 yr ago  (5/5/21) 2 yr ago  (3/22/21)   Thyroxine Free 0.70 - 1.48 ng/dL 1.00  0.91  0.91  1.00  0.97  0.75  0.72    -     T4, Free; Future; Expected date: 07/24/2023  -     TSH; Future; Expected date: 07/24/2023  -     T3, Free (OLG); Future; Expected date: 07/24/2023  -     Thyrotropin Receptor Antibody; Future; Expected date: 07/24/2023  -     Thyroid Stimulating Immunoglobulin; Future; Expected date: 07/24/2023  -     THYROID PEROXIDASE (TPO); Future; Expected date: 07/24/2023    2. Palpitation  On Propanolol 10 mg BID     3. Lymph node enlargement  US soft tissue head and neck ordered   -     US Soft Tissue Head Neck Thyroid; Future; Expected date: 07/25/2023    4. Multinodular goiter   Ultrasound 03/23/2022 no changes    Follow up in about 4 months (around 11/24/2023) for Graves.      I spent a total of 30 minutes on the day of the visit.  This includes face to face time and non-face to face time preparing to see the patient (eg, review of tests), obtaining and/or reviewing separately obtained history, documenting clinical information in the electronic or other health record, independently interpreting results and communicating results to the patient/family/caregiver, or care coordinator.

## 2023-07-25 LAB — TSH RECEP AB SER-ACNC: 3.25 IU/L (ref 0–1.75)

## 2023-07-27 LAB — TSI SER-ACNC: 1.2 TSI INDEX

## 2023-07-28 LAB — THYROID PEROXIDASE QUANT (OLG): 1313 IU/ML

## 2023-07-31 ENCOUNTER — HOSPITAL ENCOUNTER (OUTPATIENT)
Dept: RADIOLOGY | Facility: HOSPITAL | Age: 28
Discharge: HOME | End: 2023-07-31
Attending: NURSE PRACTITIONER
Payer: MEDICAID

## 2023-07-31 DIAGNOSIS — R59.9 LYMPH NODE ENLARGEMENT: ICD-10-CM

## 2023-07-31 PROCEDURE — 76536 US EXAM OF HEAD AND NECK: CPT | Mod: TC

## 2023-08-01 DIAGNOSIS — K11.8 PAROTID MASS: Primary | ICD-10-CM

## 2023-08-04 ENCOUNTER — PATIENT MESSAGE (OUTPATIENT)
Dept: ENDOCRINOLOGY | Facility: CLINIC | Age: 28
End: 2023-08-04
Payer: MEDICAID

## 2023-08-04 DIAGNOSIS — R22.1 NECK MASS: Primary | ICD-10-CM

## 2023-08-07 ENCOUNTER — HOSPITAL ENCOUNTER (OUTPATIENT)
Dept: RADIOLOGY | Facility: HOSPITAL | Age: 28
Discharge: HOME | End: 2023-08-07
Attending: NURSE PRACTITIONER
Payer: MEDICAID

## 2023-08-07 DIAGNOSIS — K11.8 PAROTID MASS: ICD-10-CM

## 2023-08-07 PROCEDURE — 70491 CT SOFT TISSUE NECK W/DYE: CPT | Mod: TC

## 2023-08-07 PROCEDURE — 25500020 PHARM REV CODE 255: Performed by: NURSE PRACTITIONER

## 2023-08-07 RX ADMIN — IOPAMIDOL 100 ML: 755 INJECTION, SOLUTION INTRAVENOUS at 09:08

## 2023-08-07 NOTE — TELEPHONE ENCOUNTER
Spoke with pt informed she does not need to complete the CT in [place], She completed it at [facility] today, will call her again once we got the results. Pt voiced understanding.

## 2023-08-11 ENCOUNTER — HOSPITAL ENCOUNTER (EMERGENCY)
Facility: HOSPITAL | Age: 28
Discharge: HOME | End: 2023-08-11
Attending: INTERNAL MEDICINE
Payer: MEDICAID

## 2023-08-11 DIAGNOSIS — R10.13 EPIGASTRIC PAIN: Primary | ICD-10-CM

## 2023-08-11 DIAGNOSIS — G89.29 CHRONIC UPPER ABDOMINAL PAIN: ICD-10-CM

## 2023-08-11 DIAGNOSIS — R10.10 CHRONIC UPPER ABDOMINAL PAIN: ICD-10-CM

## 2023-08-11 LAB
ALBUMIN SERPL-MCNC: 4.1 G/DL (ref 3.5–5)
ALBUMIN/GLOB SERPL: 1.3 RATIO (ref 1.1–2)
ALP SERPL-CCNC: 55 UNIT/L (ref 40–150)
ALT SERPL-CCNC: 9 UNIT/L (ref 0–55)
AMPHET UR QL SCN: NEGATIVE
APPEARANCE UR: CLEAR
AST SERPL-CCNC: 12 UNIT/L (ref 5–34)
B-HCG SERPL QL: NEGATIVE
BACTERIA #/AREA URNS AUTO: ABNORMAL /HPF
BARBITURATE SCN PRESENT UR: NEGATIVE
BASOPHILS # BLD AUTO: 0.05 X10(3)/MCL
BASOPHILS NFR BLD AUTO: 0.7 %
BENZODIAZ UR QL SCN: POSITIVE
BILIRUB SERPL-MCNC: 0.3 MG/DL
BILIRUB UR QL STRIP.AUTO: NEGATIVE
BUN SERPL-MCNC: 6.1 MG/DL (ref 7–18.7)
CALCIUM SERPL-MCNC: 9.1 MG/DL (ref 8.4–10.2)
CANNABINOIDS UR QL SCN: NEGATIVE
CHLORIDE SERPL-SCNC: 109 MMOL/L (ref 98–107)
CO2 SERPL-SCNC: 24 MMOL/L (ref 22–29)
COCAINE UR QL SCN: NEGATIVE
COLOR UR: YELLOW
CREAT SERPL-MCNC: 0.68 MG/DL (ref 0.55–1.02)
EOSINOPHIL # BLD AUTO: 0.21 X10(3)/MCL (ref 0–0.9)
EOSINOPHIL NFR BLD AUTO: 3 %
ERYTHROCYTE [DISTWIDTH] IN BLOOD BY AUTOMATED COUNT: 13.9 % (ref 11.5–17)
ETHANOL SERPL-MCNC: <10 MG/DL
FENTANYL UR QL SCN: NEGATIVE
GFR SERPLBLD CREATININE-BSD FMLA CKD-EPI: >60 MLS/MIN/1.73/M2
GLOBULIN SER-MCNC: 3.1 GM/DL (ref 2.4–3.5)
GLUCOSE SERPL-MCNC: 100 MG/DL (ref 74–100)
GLUCOSE UR QL STRIP.AUTO: NEGATIVE
HCT VFR BLD AUTO: 36 % (ref 37–47)
HGB BLD-MCNC: 11.7 G/DL (ref 12–16)
IMM GRANULOCYTES # BLD AUTO: 0.01 X10(3)/MCL (ref 0–0.04)
IMM GRANULOCYTES NFR BLD AUTO: 0.1 %
KETONES UR QL STRIP.AUTO: NEGATIVE
LEUKOCYTE ESTERASE UR QL STRIP.AUTO: NEGATIVE
LIPASE SERPL-CCNC: 10 U/L
LYMPHOCYTES # BLD AUTO: 3.02 X10(3)/MCL (ref 0.6–4.6)
LYMPHOCYTES NFR BLD AUTO: 43.5 %
MCH RBC QN AUTO: 28.9 PG (ref 27–31)
MCHC RBC AUTO-ENTMCNC: 32.5 G/DL (ref 33–36)
MCV RBC AUTO: 88.9 FL (ref 80–94)
MDMA UR QL SCN: NEGATIVE
MONOCYTES # BLD AUTO: 0.61 X10(3)/MCL (ref 0.1–1.3)
MONOCYTES NFR BLD AUTO: 8.8 %
MUCOUS THREADS URNS QL MICRO: ABNORMAL /LPF
NEUTROPHILS # BLD AUTO: 3.05 X10(3)/MCL (ref 2.1–9.2)
NEUTROPHILS NFR BLD AUTO: 43.9 %
NITRITE UR QL STRIP.AUTO: NEGATIVE
NRBC BLD AUTO-RTO: 0 %
OPIATES UR QL SCN: NEGATIVE
PCP UR QL: NEGATIVE
PH UR STRIP.AUTO: 6 [PH]
PH UR: 6 [PH] (ref 3–11)
PLATELET # BLD AUTO: 239 X10(3)/MCL (ref 130–400)
PMV BLD AUTO: 11.2 FL (ref 7.4–10.4)
POTASSIUM SERPL-SCNC: 3.6 MMOL/L (ref 3.5–5.1)
PROT SERPL-MCNC: 7.2 GM/DL (ref 6.4–8.3)
PROT UR QL STRIP.AUTO: NEGATIVE
RBC # BLD AUTO: 4.05 X10(6)/MCL (ref 4.2–5.4)
RBC #/AREA URNS AUTO: ABNORMAL /HPF
RBC UR QL AUTO: ABNORMAL
SODIUM SERPL-SCNC: 143 MMOL/L (ref 136–145)
SP GR UR STRIP.AUTO: >=1.03
SPECIFIC GRAVITY, URINE AUTO (.000) (OHS): >=1.03 (ref 1–1.03)
SQUAMOUS #/AREA URNS AUTO: ABNORMAL /HPF
UROBILINOGEN UR STRIP-ACNC: 0.2
WBC # SPEC AUTO: 6.95 X10(3)/MCL (ref 4.5–11.5)
WBC #/AREA URNS AUTO: ABNORMAL /HPF

## 2023-08-11 PROCEDURE — 85025 COMPLETE CBC W/AUTO DIFF WBC: CPT | Performed by: INTERNAL MEDICINE

## 2023-08-11 PROCEDURE — 80307 DRUG TEST PRSMV CHEM ANLYZR: CPT | Performed by: INTERNAL MEDICINE

## 2023-08-11 PROCEDURE — 99284 EMERGENCY DEPT VISIT MOD MDM: CPT | Mod: 25

## 2023-08-11 PROCEDURE — 81001 URINALYSIS AUTO W/SCOPE: CPT | Mod: 59 | Performed by: INTERNAL MEDICINE

## 2023-08-11 PROCEDURE — 83690 ASSAY OF LIPASE: CPT | Performed by: INTERNAL MEDICINE

## 2023-08-11 PROCEDURE — 25000003 PHARM REV CODE 250: Performed by: INTERNAL MEDICINE

## 2023-08-11 PROCEDURE — 80053 COMPREHEN METABOLIC PANEL: CPT | Performed by: INTERNAL MEDICINE

## 2023-08-11 PROCEDURE — 82077 ASSAY SPEC XCP UR&BREATH IA: CPT | Performed by: INTERNAL MEDICINE

## 2023-08-11 PROCEDURE — 81025 URINE PREGNANCY TEST: CPT | Performed by: INTERNAL MEDICINE

## 2023-08-11 RX ORDER — SUCRALFATE 1 G/1
1 TABLET ORAL
Qty: 40 TABLET | Refills: 0 | Status: SHIPPED | OUTPATIENT
Start: 2023-08-11 | End: 2023-08-21

## 2023-08-11 RX ORDER — FAMOTIDINE 20 MG/1
20 TABLET, FILM COATED ORAL
Status: COMPLETED | OUTPATIENT
Start: 2023-08-11 | End: 2023-08-11

## 2023-08-11 RX ORDER — FAMOTIDINE 20 MG/1
20 TABLET, FILM COATED ORAL 2 TIMES DAILY
Qty: 20 TABLET | Refills: 0 | Status: SHIPPED | OUTPATIENT
Start: 2023-08-11 | End: 2023-11-28

## 2023-08-11 RX ORDER — SUCRALFATE 1 G/1
1 TABLET ORAL ONCE
Status: COMPLETED | OUTPATIENT
Start: 2023-08-11 | End: 2023-08-11

## 2023-08-11 RX ORDER — ONDANSETRON 4 MG/1
4 TABLET, ORALLY DISINTEGRATING ORAL EVERY 6 HOURS PRN
Qty: 15 TABLET | Refills: 0 | Status: SHIPPED | OUTPATIENT
Start: 2023-08-11

## 2023-08-11 RX ORDER — BISACODYL 5 MG
5 TABLET, DELAYED RELEASE (ENTERIC COATED) ORAL DAILY PRN
Qty: 14 TABLET | Refills: 0 | Status: SHIPPED | OUTPATIENT
Start: 2023-08-11 | End: 2023-11-28

## 2023-08-11 RX ADMIN — FAMOTIDINE 20 MG: 20 TABLET, FILM COATED ORAL at 11:08

## 2023-08-11 RX ADMIN — SUCRALFATE 1 G: 1 TABLET ORAL at 11:08

## 2023-08-12 VITALS
BODY MASS INDEX: 20.62 KG/M2 | WEIGHT: 105 LBS | HEIGHT: 60 IN | SYSTOLIC BLOOD PRESSURE: 122 MMHG | OXYGEN SATURATION: 98 % | RESPIRATION RATE: 20 BRPM | DIASTOLIC BLOOD PRESSURE: 85 MMHG | TEMPERATURE: 98 F | HEART RATE: 90 BPM

## 2023-08-12 NOTE — ED PROVIDER NOTES
Source of History:  Patient, no limitations    Chief complaint:  Abdominal Pain and Nausea (Pt to er with abd pain x 1 week )      HPI:  Royce Quigley is a 28 y.o. female presenting with Abdominal Pain and Nausea (Pt to er with abd pain x 1 week )         Patient presents for evaluation of abdominal pain. Onset of symptoms was gradual starting a few years ago with gradually worsening course since that time. The pain is located epigastric without radiation. The pain is rated as moderate. The pain is made worse by food and is relieved by nothing. The patient also complains of anorexia and belching. The patient denies diarrhea and fever. The past workup has included GI consult, CT scan, ultrasound, surgery.       Review of Systems   Constitutional symptoms:  Negative except as documented in HPI.   Skin symptoms:  Negative except as documented in HPI.   HEENT symptoms:  Negative except as documented in HPI.   Respiratory symptoms:  Negative except as documented in HPI.   Cardiovascular symptoms:  Negative except as documented in HPI.   Gastrointestinal symptoms:  Negative except as documented in HPI.    Genitourinary symptoms:  Negative except as documented in HPI.   Musculoskeletal symptoms:  Negative except as documented in HPI.   Neurologic symptoms:  Negative except as documented in HPI.   Psychiatric symptoms:  Negative except as documented in HPI.   Allergy/immunologic symptoms:  Negative except as documented in HPI.             Additional review of systems information: All other systems reviewed and otherwise negative.      Review of patient's allergies indicates:   Allergen Reactions    Diphenhydramine Anxiety and Other (See Comments)    Diphenhydramine hcl Anxiety     Other reaction(s): felt funny       PMH:  As per HPI and below:    Past Medical History:   Diagnosis Date    Anxiety disorder, unspecified     Depression     Graves disease         Family History   Problem Relation Age of Onset    Bipolar  disorder Mother     Irritable bowel syndrome Mother     Heart disease Father     Heart attack Father        Past Surgical History:   Procedure Laterality Date    CHOLECYSTECTOMY      DX-LAP, EVACUATION HEMOPERITONEUM  11/30/2021    LAPAROSCOPIC CHOLECYSTECTOMY N/A 10/10/2022    Procedure: CHOLECYSTECTOMY, LAPAROSCOPIC;  Surgeon: Tobin Aguiar MD;  Location: [facility];  Service: General;  Laterality: N/A;    TONSILLECTOMY         Social History     Tobacco Use    Smoking status: Every Day     Current packs/day: 1.00     Average packs/day: 1 pack/day for 14.0 years (14.0 ttl pk-yrs)     Types: Cigarettes     Passive exposure: Never    Smokeless tobacco: Never   Substance Use Topics    Alcohol use: Never    Drug use: Not Currently       Patient Active Problem List   Diagnosis    Graves disease        Physical Exam:    /85   Pulse (!) 117   Temp 98.1 °F (36.7 °C) (Oral)   Resp 20   Ht 5' (1.524 m)   Wt 47.6 kg (105 lb)   LMP 07/10/2023   SpO2 97%   BMI 20.51 kg/m²     Nursing note and vital signs reviewed.    General:  Alert, no acute distress.   Skin: Normal for Ethnic Origin, No cyanosis  HEENT: Normocephalic and atraumatic, Vision unchanged, Pupils symmetric, No icterus , Nasal mucosa is pink and moist  Cardiovascular:  Regular rate and rhythm, No edema  Chest Wall: No deformity, equal chest rise  Respiratory:  Lungs are clear to auscultation, respirations are non-labored.    Musculoskeletal:  No deformity, Normal perfusion to all extremities  Gastrointestinal:  Soft, Non distended  Neurological:  Alert and oriented, normal motor observed, normal speech observed.    Psychiatric:  Cooperative, appropriate mood & affect.        Labs that have been ordered have been independently reviewed and interpreted by myself.     Old Chart Reviewed.      Initial Impression/ Differential Dx:  GERD, intestinal spasm, gastroenteritis, gastritis, ulcer, cholecystitis, cholelithiasis, gallstones, pancreatitis, ileus,  small bowel obstruction, appendicitis, diverticulitis, colitis, constipation, intestinal gas pain.      MDM:      Reviewed Nurses Note.    Reviewed Pertinent old records.    Orders Placed This Encounter    CT Abdomen Pelvis  Without Contrast    Urinalysis, Reflex to Urine Culture    Pregnancy, urine rapid    Urinalysis, Microscopic    Drug Screen, Urine    CBC Auto Differential    Comprehensive Metabolic Panel    Lipase    Ethanol    CBC with Differential    sucralfate tablet 1 g    famotidine tablet 20 mg    ondansetron (ZOFRAN-ODT) 4 MG TbDL    sucralfate (CARAFATE) 1 gram tablet    famotidine (PEPCID) 20 MG tablet    bisacodyL (DULCOLAX) 5 mg EC tablet                    Labs Reviewed   URINALYSIS, REFLEX TO URINE CULTURE - Abnormal; Notable for the following components:       Result Value    Blood, UA Large (*)     All other components within normal limits   URINALYSIS, MICROSCOPIC - Abnormal; Notable for the following components:    Bacteria, UA Few (*)     Mucous, UA Moderate (*)     Squamous Epithelial Cells, UA Few (*)     All other components within normal limits   DRUG SCREEN, URINE (BEAKER) - Abnormal; Notable for the following components:    Benzodiazepine, Urine Positive (*)     All other components within normal limits    Narrative:     Cut off concentrations:    Amphetamines - 1000 ng/ml  Barbiturates - 200 ng/ml  Benzodiazepine - 200 ng/ml  Cannabinoids (THC) - 50 ng/ml  Cocaine - 300 ng/ml  Fentanyl - 1.0 ng/ml  MDMA - 500 ng/ml  Opiates - 300 ng/ml   Phencyclidine (PCP) - 25 ng/ml    Specimen submitted for drug analysis and tested for pH and specific gravity in order to evaluate sample integrity. Suspect tampering if specific gravity is <1.003 and/or pH is not within the range of 4.5 - 8.0  False negatives may result form substances such as bleach added to urine.  False positives may result for the presence of a substance with similar chemical structure to the drug or its metabolite.    This test  provides only a PRELIMINARY analytical test result. A more specific alternate chemical method must be used in order to obtain a confirmed analytical result. Gas chromatography/mass spectrometry (GC/MS) is the preferred confirmatory method. Other chemical confirmation methods are available. Clinical consideration and professional judgement should be applied to any drug of abuse test result, particularly when preliminary positive results are used.    Positive results will be confirmed only at the physicians request. Unconfirmed screening results are to be used only for medical purposes (treatment).        COMPREHENSIVE METABOLIC PANEL - Abnormal; Notable for the following components:    Chloride 109 (*)     Blood Urea Nitrogen 6.1 (*)     All other components within normal limits   CBC WITH DIFFERENTIAL - Abnormal; Notable for the following components:    RBC 4.05 (*)     Hgb 11.7 (*)     Hct 36.0 (*)     MCHC 32.5 (*)     MPV 11.2 (*)     All other components within normal limits   PREGNANCY TEST, URINE RAPID - Normal   LIPASE - Normal   ALCOHOL,MEDICAL (ETHANOL) - Normal   CBC W/ AUTO DIFFERENTIAL    Narrative:     The following orders were created for panel order CBC Auto Differential.  Procedure                               Abnormality         Status                     ---------                               -----------         ------                     CBC with Differential[[id number]]        Abnormal            Final result                 Please view results for these tests on the individual orders.          CT Abdomen Pelvis  Without Contrast              Admission on 08/11/2023, Discharged on 08/11/2023   Component Date Value Ref Range Status    Color, UA 08/11/2023 Yellow  Yellow, Light-Yellow, Dark Yellow, Itzel, Straw Final    Appearance, UA 08/11/2023 Clear  Clear Final    Specific Gravity, UA 08/11/2023 >=1.030   Final    pH, UA 08/11/2023 6.0  5.0 - 8.5 Final    Protein, UA 08/11/2023 Negative   Negative Final    Glucose, UA 08/11/2023 Negative  Negative, Normal Final    Ketones, UA 08/11/2023 Negative  Negative Final    Blood, UA 08/11/2023 Large (A)  Negative Final    Bilirubin, UA 08/11/2023 Negative  Negative Final    Urobilinogen, UA 08/11/2023 0.2  0.2, 1.0, Normal Final    Nitrites, UA 08/11/2023 Negative  Negative Final    Leukocyte Esterase, UA 08/11/2023 Negative  Negative Final    Beta hCG Qualitative, Urine 08/11/2023 Negative  Negative Final    Bacteria, UA 08/11/2023 Few (A)  None Seen, Rare, Occasional /HPF Final    Mucous, UA 08/11/2023 Moderate (A)  None Seen /LPF Final    RBC, UA 08/11/2023 3-5  None Seen, 0-2, 3-5, 0-5 /HPF Final    WBC, UA 08/11/2023 0-2  None Seen, 0-2, 3-5, 0-5 /HPF Final    Squamous Epithelial Cells, UA 08/11/2023 Few (A)  None Seen, Rare, Occasional, Occ /HPF Final    Amphetamines, Urine 08/11/2023 Negative  Negative Final    Barbituates, Urine 08/11/2023 Negative  Negative Final    Benzodiazepine, Urine 08/11/2023 Positive (A)  Negative Final    Cannabinoids, Urine 08/11/2023 Negative  Negative Final    Cocaine, Urine 08/11/2023 Negative  Negative Final    Fentanyl, Urine 08/11/2023 Negative  Negative Final    MDMA, Urine 08/11/2023 Negative  Negative Final    Opiates, Urine 08/11/2023 Negative  Negative Final    Phencyclidine, Urine 08/11/2023 Negative  Negative Final    pH, Urine 08/11/2023 6.0  3.0 - 11.0 Final    Specific Gravity, Urine Auto 08/11/2023 >=1.030  1.001 - 1.035 Final    Sodium Level 08/11/2023 143  136 - 145 mmol/L Final    Potassium Level 08/11/2023 3.6  3.5 - 5.1 mmol/L Final    Chloride 08/11/2023 109 (H)  98 - 107 mmol/L Final    Carbon Dioxide 08/11/2023 24  22 - 29 mmol/L Final    Glucose Level 08/11/2023 100  74 - 100 mg/dL Final    Blood Urea Nitrogen 08/11/2023 6.1 (L)  7.0 - 18.7 mg/dL Final    Creatinine 08/11/2023 0.68  0.55 - 1.02 mg/dL Final    Calcium Level Total 08/11/2023 9.1  8.4 - 10.2 mg/dL Final    Protein Total 08/11/2023 7.2   6.4 - 8.3 gm/dL Final    Albumin Level 08/11/2023 4.1  3.5 - 5.0 g/dL Final    Globulin 08/11/2023 3.1  2.4 - 3.5 gm/dL Final    Albumin/Globulin Ratio 08/11/2023 1.3  1.1 - 2.0 ratio Final    Bilirubin Total 08/11/2023 0.3  <=1.5 mg/dL Final    Alkaline Phosphatase 08/11/2023 55  40 - 150 unit/L Final    Alanine Aminotransferase 08/11/2023 9  0 - 55 unit/L Final    Aspartate Aminotransferase 08/11/2023 12  5 - 34 unit/L Final    eGFR 08/11/2023 >60  mls/min/1.73/m2 Final    Lipase Level 08/11/2023 10  <=60 U/L Final    Ethanol Level 08/11/2023 <10.0  <=10.0 mg/dL Final    WBC 08/11/2023 6.95  4.50 - 11.50 x10(3)/mcL Final    RBC 08/11/2023 4.05 (L)  4.20 - 5.40 x10(6)/mcL Final    Hgb 08/11/2023 11.7 (L)  12.0 - 16.0 g/dL Final    Hct 08/11/2023 36.0 (L)  37.0 - 47.0 % Final    MCV 08/11/2023 88.9  80.0 - 94.0 fL Final    MCH 08/11/2023 28.9  27.0 - 31.0 pg Final    MCHC 08/11/2023 32.5 (L)  33.0 - 36.0 g/dL Final    RDW 08/11/2023 13.9  11.5 - 17.0 % Final    Platelet 08/11/2023 239  130 - 400 x10(3)/mcL Final    MPV 08/11/2023 11.2 (H)  7.4 - 10.4 fL Final    Neut % 08/11/2023 43.9  % Final    Lymph % 08/11/2023 43.5  % Final    Mono % 08/11/2023 8.8  % Final    Eos % 08/11/2023 3.0  % Final    Basophil % 08/11/2023 0.7  % Final    Lymph # 08/11/2023 3.02  0.6 - 4.6 x10(3)/mcL Final    Neut # 08/11/2023 3.05  2.1 - 9.2 x10(3)/mcL Final    Mono # 08/11/2023 0.61  0.1 - 1.3 x10(3)/mcL Final    Eos # 08/11/2023 0.21  0 - 0.9 x10(3)/mcL Final    Baso # 08/11/2023 0.05  <=0.2 x10(3)/mcL Final    IG# 08/11/2023 0.01  0 - 0.04 x10(3)/mcL Final    IG% 08/11/2023 0.1  % Final    NRBC% 08/11/2023 0.0  % Final       Imaging Results              CT Abdomen Pelvis  Without Contrast (Preliminary result)  Result time 08/11/23 22:17:09      Preliminary result by Fabricio Guevara MD (08/11/23 22:17:09)                   Narrative:    START OF REPORT:  Technique: CT of the abdomen and pelvis was performed with axial images as well  as sagittal and coronal reconstruction images without intravenous contrast.    Comparison: None available.    Clinical History: Abdominal Pain.    Dosage Information: Automated Exposure Control was utilized 180.56 mGy.cm.    Findings:  Lines and Tubes: None.  Thorax:  Lungs: The visualized lung bases appear unremarkable.  Pleura: No effusions or thickening.  Heart: The heart size is within normal limits.  Abdomen:  Abdominal Wall: No abdominal wall pathology is seen.  Liver: The liver appears unremarkable.  Biliary System: No intrahepatic or extrahepatic biliary duct dilatation is seen.  Gallbladder: Surgical clips are seen in the gallbladder fossa consistent with prior cholecystectomy correlate with surgical history and visual inspection.  Pancreas: The pancreas appears unremarkable.  Spleen: The spleen appears unremarkable.  Adrenals: The adrenal glands appear unremarkable.  Kidneys: The kidneys appear unremarkable with no stones cysts masses or hydronephrosis.  Aorta: The abdominal aorta appears unremarkable.  IVC: Unremarkable.  Bowel:  Esophagus: The visualized esophagus appears unremarkable.  Stomach: The stomach appears unremarkable.  Duodenum: Unremarkable appearing duodenum.  Small Bowel: The small bowel appears unremarkable.  Colon: Nondistended.  Appendix: The appendix appears unremarkable and is seen on series 2, image 97.  Peritoneum: No intraperitoneal free air or ascites is seen.    Pelvis:  Bladder: The bladder appears unremarkable.  Female:  Uterus: The uterus is retroverted and appears unremarkable. A vaginal tampon is seen.  Ovaries: No adnexal masses are seen.    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  Bony Pelvis: The visualized bony structures of the pelvis appear unremarkable.      Impression:  1. No acute intraabdominal or pelvic pathology is identified. Details and other findings as discussed  above.                                                       ED Course as of 08/11/23 2353   Fri Aug 11, 2023   2037 Occult Blood UA(!): Large [MP]   2037 Preg Test, Ur: Negative [MP]      ED Course User Index  [MP] Erasmo Kilpatrick DO                        Diagnostic Impression:    1. Epigastric pain    2. Chronic upper abdominal pain         ED Disposition Condition    Discharge Stable             Follow-up Information       [facility] - Emergency Dept.    Specialty: Emergency Medicine  Why: If symptoms worsen  Contact information:  [address]  [facility] [phone] [phone]                            ED Prescriptions       Medication Sig Dispense Start Date End Date Auth. Provider    ondansetron (ZOFRAN-ODT) 4 MG TbDL Take 1 tablet (4 mg total) by mouth every 6 (six) hours as needed (nausea). 15 tablet 8/11/2023 -- Erasmo Kilpatrick DO    sucralfate (CARAFATE) 1 gram tablet Take 1 tablet (1 g total) by mouth 4 (four) times daily before meals and nightly. for 10 days 40 tablet 8/11/2023 8/21/2023 Erasmo Kilpatrick DO    famotidine (PEPCID) 20 MG tablet Take 1 tablet (20 mg total) by mouth 2 (two) times daily. for 10 days 20 tablet 8/11/2023 8/21/2023 Erasmo Kilpatrick DO    bisacodyL (DULCOLAX) 5 mg EC tablet Take 1 tablet (5 mg total) by mouth daily as needed for Constipation. 14 tablet 8/11/2023 -- Erasmo Kilpatrick DO          Follow-up Information       Follow up With Specialties Details Why Contact Info    [facility] - Emergency Dept Emergency Medicine  If symptoms worsen [address]  [facility] [id number] [phone]             Erasmo Kilpatrick DO  08/11/23 2353

## 2023-08-18 ENCOUNTER — HOSPITAL ENCOUNTER (EMERGENCY)
Facility: HOSPITAL | Age: 28
Discharge: HOME | End: 2023-08-18
Attending: EMERGENCY MEDICINE
Payer: MEDICAID

## 2023-08-18 VITALS
SYSTOLIC BLOOD PRESSURE: 102 MMHG | OXYGEN SATURATION: 100 % | HEART RATE: 86 BPM | BODY MASS INDEX: 20.67 KG/M2 | DIASTOLIC BLOOD PRESSURE: 69 MMHG | TEMPERATURE: 99 F | WEIGHT: 105.81 LBS | RESPIRATION RATE: 18 BRPM

## 2023-08-18 DIAGNOSIS — Z13.9 SCREENING DUE: ICD-10-CM

## 2023-08-18 DIAGNOSIS — R00.0 RAPID HEART BEAT: ICD-10-CM

## 2023-08-18 DIAGNOSIS — K05.10 GINGIVITIS: Primary | ICD-10-CM

## 2023-08-18 LAB
ALBUMIN SERPL-MCNC: 4.2 G/DL (ref 3.5–5)
ALBUMIN/GLOB SERPL: 1.3 RATIO (ref 1.1–2)
ALP SERPL-CCNC: 60 UNIT/L (ref 40–150)
ALT SERPL-CCNC: 9 UNIT/L (ref 0–55)
AMPHET UR QL SCN: NEGATIVE
APPEARANCE UR: CLEAR
AST SERPL-CCNC: 11 UNIT/L (ref 5–34)
BACTERIA #/AREA URNS AUTO: ABNORMAL /HPF
BARBITURATE SCN PRESENT UR: NEGATIVE
BASOPHILS # BLD AUTO: 0.05 X10(3)/MCL
BASOPHILS NFR BLD AUTO: 0.5 %
BENZODIAZ UR QL SCN: POSITIVE
BILIRUB SERPL-MCNC: 0.3 MG/DL
BILIRUB UR QL STRIP.AUTO: NEGATIVE
BUN SERPL-MCNC: 9.5 MG/DL (ref 7–18.7)
CALCIUM SERPL-MCNC: 9.3 MG/DL (ref 8.4–10.2)
CANNABINOIDS UR QL SCN: NEGATIVE
CHLORIDE SERPL-SCNC: 105 MMOL/L (ref 98–107)
CO2 SERPL-SCNC: 24 MMOL/L (ref 22–29)
COCAINE UR QL SCN: NEGATIVE
COLOR UR: ABNORMAL
CREAT SERPL-MCNC: 0.69 MG/DL (ref 0.55–1.02)
CRP SERPL-MCNC: 0.4 MG/L
EOSINOPHIL # BLD AUTO: 0.09 X10(3)/MCL (ref 0–0.9)
EOSINOPHIL NFR BLD AUTO: 0.9 %
ERYTHROCYTE [DISTWIDTH] IN BLOOD BY AUTOMATED COUNT: 13.8 % (ref 11.5–17)
FENTANYL UR QL SCN: NEGATIVE
GFR SERPLBLD CREATININE-BSD FMLA CKD-EPI: >60 MLS/MIN/1.73/M2
GLOBULIN SER-MCNC: 3.3 GM/DL (ref 2.4–3.5)
GLUCOSE SERPL-MCNC: 101 MG/DL (ref 74–100)
GLUCOSE UR QL STRIP.AUTO: NORMAL
HCT VFR BLD AUTO: 35.8 % (ref 37–47)
HGB BLD-MCNC: 11.5 G/DL (ref 12–16)
HYALINE CASTS #/AREA URNS LPF: ABNORMAL /LPF
IMM GRANULOCYTES # BLD AUTO: 0.03 X10(3)/MCL (ref 0–0.04)
IMM GRANULOCYTES NFR BLD AUTO: 0.3 %
KETONES UR QL STRIP.AUTO: NEGATIVE
LEUKOCYTE ESTERASE UR QL STRIP.AUTO: NEGATIVE
LYMPHOCYTES # BLD AUTO: 1.78 X10(3)/MCL (ref 0.6–4.6)
LYMPHOCYTES NFR BLD AUTO: 17.4 %
MCH RBC QN AUTO: 28.2 PG (ref 27–31)
MCHC RBC AUTO-ENTMCNC: 32.1 G/DL (ref 33–36)
MCV RBC AUTO: 87.7 FL (ref 80–94)
MDMA UR QL SCN: NEGATIVE
MONOCYTES # BLD AUTO: 0.64 X10(3)/MCL (ref 0.1–1.3)
MONOCYTES NFR BLD AUTO: 6.3 %
MUCOUS THREADS URNS QL MICRO: ABNORMAL /LPF
NEUTROPHILS # BLD AUTO: 7.65 X10(3)/MCL (ref 2.1–9.2)
NEUTROPHILS NFR BLD AUTO: 74.6 %
NITRITE UR QL STRIP.AUTO: NEGATIVE
NRBC BLD AUTO-RTO: 0 %
OPIATES UR QL SCN: NEGATIVE
PCP UR QL: NEGATIVE
PH UR STRIP.AUTO: 6 [PH]
PH UR: 6 [PH] (ref 3–11)
PLATELET # BLD AUTO: 230 X10(3)/MCL (ref 130–400)
PMV BLD AUTO: 11.3 FL (ref 7.4–10.4)
POTASSIUM SERPL-SCNC: 3.5 MMOL/L (ref 3.5–5.1)
PROT SERPL-MCNC: 7.5 GM/DL (ref 6.4–8.3)
PROT UR QL STRIP.AUTO: NEGATIVE
RBC # BLD AUTO: 4.08 X10(6)/MCL (ref 4.2–5.4)
RBC #/AREA URNS AUTO: ABNORMAL /HPF
RBC UR QL AUTO: ABNORMAL
SODIUM SERPL-SCNC: 139 MMOL/L (ref 136–145)
SP GR UR STRIP.AUTO: 1.02
SPECIFIC GRAVITY, URINE AUTO (.000) (OHS): 1.02 (ref 1–1.03)
SQUAMOUS #/AREA URNS LPF: ABNORMAL /HPF
TSH SERPL-ACNC: 1.93 UIU/ML (ref 0.35–4.94)
UROBILINOGEN UR STRIP-ACNC: NORMAL
WBC # SPEC AUTO: 10.24 X10(3)/MCL (ref 4.5–11.5)
WBC #/AREA URNS AUTO: ABNORMAL /HPF

## 2023-08-18 PROCEDURE — 80053 COMPREHEN METABOLIC PANEL: CPT | Performed by: EMERGENCY MEDICINE

## 2023-08-18 PROCEDURE — 86140 C-REACTIVE PROTEIN: CPT | Performed by: EMERGENCY MEDICINE

## 2023-08-18 PROCEDURE — 85025 COMPLETE CBC W/AUTO DIFF WBC: CPT | Performed by: EMERGENCY MEDICINE

## 2023-08-18 PROCEDURE — 80307 DRUG TEST PRSMV CHEM ANLYZR: CPT | Performed by: EMERGENCY MEDICINE

## 2023-08-18 PROCEDURE — 99284 EMERGENCY DEPT VISIT MOD MDM: CPT

## 2023-08-18 PROCEDURE — 84443 ASSAY THYROID STIM HORMONE: CPT | Performed by: EMERGENCY MEDICINE

## 2023-08-18 PROCEDURE — 93005 ELECTROCARDIOGRAM TRACING: CPT

## 2023-08-18 PROCEDURE — 81001 URINALYSIS AUTO W/SCOPE: CPT | Performed by: EMERGENCY MEDICINE

## 2023-08-18 RX ORDER — CLINDAMYCIN HYDROCHLORIDE 300 MG/1
300 CAPSULE ORAL EVERY 6 HOURS
Qty: 40 CAPSULE | Refills: 0 | Status: SHIPPED | OUTPATIENT
Start: 2023-08-18 | End: 2023-08-28

## 2023-08-18 NOTE — ED PROVIDER NOTES
Encounter Date: 8/18/2023       History     Chief Complaint   Patient presents with    Dental Pain     DENTAL PAIN W RAD TO RT EAR AFTER HAVING 3 TEETH PULLED LAST SAT. ONLY TOOK RX ANTIBIOTICS X 2 DAYS.  THEN ADDS  BODY ACHES, FATIGUE, SOB, CP AND PAL. SINCE THIS AM.  HX OF THYROID DIS.  AND ENDOCARDITIS.   NO FEVER.  EKG OBTAINED.     Palpitations     28-year-old woman presents today with multiple vague complaints essentially some aching to a request for clindamycin for right maxillary gingival pain after recent extraction.  Patient also endorsing palpitations, history of thyroid dysfunction, reporting recently changed methimazole dose.        Review of patient's allergies indicates:   Allergen Reactions    Diphenhydramine Anxiety and Other (See Comments)    Diphenhydramine hcl Anxiety     Other reaction(s): felt funny     Past Medical History:   Diagnosis Date    Anxiety disorder, unspecified     Depression     Graves disease      Past Surgical History:   Procedure Laterality Date    CHOLECYSTECTOMY      DX-LAP, EVACUATION HEMOPERITONEUM  11/30/2021    LAPAROSCOPIC CHOLECYSTECTOMY N/A 10/10/2022    Procedure: CHOLECYSTECTOMY, LAPAROSCOPIC;  Surgeon: Tobin Aguiar MD;  Location: [facility];  Service: General;  Laterality: N/A;    TONSILLECTOMY       Family History   Problem Relation Age of Onset    Bipolar disorder Mother     Irritable bowel syndrome Mother     Heart disease Father     Heart attack Father      Social History     Tobacco Use    Smoking status: Every Day     Current packs/day: 1.00     Average packs/day: 1 pack/day for 29.6 years (29.6 ttl pk-yrs)     Types: Cigarettes     Start date: 2008     Passive exposure: Never    Smokeless tobacco: Never   Substance Use Topics    Alcohol use: Never    Drug use: Not Currently     Review of Systems   All other systems reviewed and are negative.      Physical Exam     Initial Vitals [08/18/23 1324]   BP Pulse Resp Temp SpO2   113/77 102 18 99 °F (37.2 °C) 100 %       MAP       --         Physical Exam    Nursing note and vitals reviewed.  Constitutional: She appears well-developed and well-nourished. She is not diaphoretic. No distress.   HENT:   Head: Normocephalic and atraumatic.   Right Ear: External ear normal.   Left Ear: External ear normal.   Three right maxilary molars surgically absent; mild gingival tenderness ;   Eyes: EOM are normal. Pupils are equal, round, and reactive to light. Right eye exhibits no discharge. Left eye exhibits no discharge.   Neck: Neck supple. No thyromegaly present. No tracheal deviation present. No JVD present.   Normal range of motion.  Cardiovascular:  Normal rate, regular rhythm, normal heart sounds and intact distal pulses.     Exam reveals no gallop and no friction rub.       No murmur heard.  Pulmonary/Chest: Breath sounds normal. No stridor. No respiratory distress. She has no wheezes. She has no rhonchi. She has no rales.   Abdominal: Abdomen is soft. Bowel sounds are normal. She exhibits no distension. There is no abdominal tenderness. There is no rebound and no guarding.   Musculoskeletal:         General: No tenderness or edema. Normal range of motion.      Cervical back: Normal range of motion and neck supple.     Neurological: She is alert and oriented to person, place, and time. She has normal strength. No cranial nerve deficit or sensory deficit. GCS score is 15. GCS eye subscore is 4. GCS verbal subscore is 5. GCS motor subscore is 6.   Skin: Skin is warm and dry. Capillary refill takes less than 2 seconds. No rash and no abscess noted. No erythema. No pallor.   Psychiatric: She has a normal mood and affect. Her behavior is normal. Judgment and thought content normal.         ED Course   Procedures  Labs Reviewed   COMPREHENSIVE METABOLIC PANEL - Abnormal; Notable for the following components:       Result Value    Glucose Level 101 (*)     All other components within normal limits   URINALYSIS, REFLEX TO URINE CULTURE -  Abnormal; Notable for the following components:    Blood, UA Trace (*)     Squamous Epithelial Cells, UA Occ (*)     Mucous, UA Trace (*)     All other components within normal limits   DRUG SCREEN, URINE (BEAKER) - Abnormal; Notable for the following components:    Benzodiazepine, Urine Positive (*)     All other components within normal limits    Narrative:     Cut off concentrations:    Amphetamines - 1000 ng/ml  Barbiturates - 200 ng/ml  Benzodiazepine - 200 ng/ml  Cannabinoids (THC) - 50 ng/ml  Cocaine - 300 ng/ml  Fentanyl - 1.0 ng/ml  MDMA - 500 ng/ml  Opiates - 300 ng/ml   Phencyclidine (PCP) - 25 ng/ml    Specimen submitted for drug analysis and tested for pH and specific gravity in order to evaluate sample integrity. Suspect tampering if specific gravity is <1.003 and/or pH is not within the range of 4.5 - 8.0  False negatives may result form substances such as bleach added to urine.  False positives may result for the presence of a substance with similar chemical structure to the drug or its metabolite.    This test provides only a PRELIMINARY analytical test result. A more specific alternate chemical method must be used in order to obtain a confirmed analytical result. Gas chromatography/mass spectrometry (GC/MS) is the preferred confirmatory method. Other chemical confirmation methods are available. Clinical consideration and professional judgement should be applied to any drug of abuse test result, particularly when preliminary positive results are used.    Positive results will be confirmed only at the physicians request. Unconfirmed screening results are to be used only for medical purposes (treatment).        CBC WITH DIFFERENTIAL - Abnormal; Notable for the following components:    RBC 4.08 (*)     Hgb 11.5 (*)     Hct 35.8 (*)     MCHC 32.1 (*)     MPV 11.3 (*)     All other components within normal limits   C-REACTIVE PROTEIN - Normal   TSH - Normal   CBC W/ AUTO DIFFERENTIAL    Narrative:      The following orders were created for panel order CBC auto differential.  Procedure                               Abnormality         Status                     ---------                               -----------         ------                     CBC with Differential[[id number]]        Abnormal            Final result                 Please view results for these tests on the individual orders.   EXTRA TUBES    Narrative:     The following orders were created for panel order EXTRA TUBES.  Procedure                               Abnormality         Status                     ---------                               -----------         ------                     Light Blue Top Hold[[id number]]                              In process                 Gold Top Hold[[id number]]                                    In process                 Pink Top Hold[[id number]]                                    In process                   Please view results for these tests on the individual orders.   LIGHT BLUE TOP HOLD   GOLD TOP HOLD   PINK TOP HOLD   POCT URINE PREGNANCY     EKG Readings: (Independently Interpreted)   ST @ 116, non acute and non ischemic appearing ;     ECG Results              EKG 12-lead (Rapid Heart Beat / Palpitations) Age > 50 (In process)  Result time 08/18/23 14:11:50      In process by Interface, Lab In [facility] (08/18/23 14:11:50)                   Narrative:    Test Reason : R00.0,    Vent. Rate : 116 BPM     Atrial Rate : 116 BPM     P-R Int : 144 ms          QRS Dur : 076 ms      QT Int : 322 ms       P-R-T Axes : 080 070 060 degrees     QTc Int : 447 ms    Sinus tachycardia  Cannot rule out Anterior infarct ,age undetermined  Abnormal ECG  When compared with ECG of 10-OCT-2022 22:02,  No significant change was found    Referred By: [email or web address]   SELF           Confirmed By:                                   Imaging Results    None          Medications - No data to display  Medical Decision Making  This is  a 28-year-old woman who presents to the emergency department complaining of pain to the right maxillary jaw after recent dental extraction, ipsilateral.  She is also endorsing anxiety and palpitations, reporting recent change in her methimazole dose (history Graves disease).  Patient also concerned for possible endocarditis, given history dental work and patient's personal research on [place].  Differential diagnosis includes gingivitis, dental caries, anxiety, absolute or relative hypovolemia, electrolyte abnormality, others.    Amount and/or Complexity of Data Reviewed  External Data Reviewed: labs and ECG.  Labs: ordered. Decision-making details documented in ED Course.  ECG/medicine tests:  Decision-making details documented in ED Course.    Risk  Prescription drug management.  Risk Details: Risk is found sufficient to warrant an expanded evaluation with objective data in order to diminish the probability an emergent process remains occult.  The objective data resulted in found generally reassuring.  We do find it appropriate to order clindamycin for infection, presumed odontogenic source.  Patient comprehend she will need to follow up with Dentistry.  She is discharged home with prescriptions, anticipatory guidance, return precautions, follow-up instructions.               ED Course as of 08/18/23 1627   Fri Aug 18, 2023   1525 Reassuring hemogram ; [CT]   1525 Contaminated ua, unconvincing as uti ; [CT]   1537 Reassuring chemistries ; [CT]   1537 Crp negative ; [CT]   1537 Utox positive for benzo ; [CT]   1619 Normal TSH ; [CT]      ED Course User Index  [CT] Darshan Newell MD                      Clinical Impression:   Final diagnoses:  [R00.0] Rapid heart beat  [K05.10] Gingivitis (Primary)        ED Disposition Condition    Discharge Stable          ED Prescriptions       Medication Sig Dispense Start Date End Date Auth. Provider    clindamycin (CLEOCIN) 300 MG capsule Take 1 capsule (300 mg total)  by mouth every 6 (six) hours. for 10 days 40 capsule 8/18/2023 8/28/2023 Darshan Newell MD          Follow-up Information       Follow up With Specialties Details Why Contact Info    [facility] Emergency Medicine  As needed, If symptoms worsen [address] [facility] [id number] [phone]             Darshan Newell MD  08/18/23 1898

## 2023-08-22 ENCOUNTER — TELEPHONE (OUTPATIENT)
Dept: ENDOCRINOLOGY | Facility: CLINIC | Age: 28
End: 2023-08-22
Payer: MEDICAID

## 2023-08-22 NOTE — TELEPHONE ENCOUNTER
Phoned and left message letting patient know Ms. Harper is asking that she contact PCP or go to Urgent Care regarding labs for infection.

## 2023-08-22 NOTE — TELEPHONE ENCOUNTER
----- Message from Meredith Sales NP sent at 8/21/2023  4:47 PM CDT -----  Regarding: FW: Labs  Pt needs to call PCP for blood work for infection like CBC.  PCP or walk in needs to address.   ----- Message -----  From: Odalis Faye LPN  Sent: 8/18/2023   3:48 PM CDT  To: Meredith Sales NP  Subject: FW: Labs                                           ----- Message -----  From: Tiffany Bahena  Sent: 8/18/2023  12:01 PM CDT  To: [facility] Clinical Support Staff  Subject: Sheela CHARLES PT       Pt is requesting to do lab work bc she is feeling bad and might have an infection from pulling teeth.   Please call back @ [phone]

## 2023-09-01 ENCOUNTER — PATIENT MESSAGE (OUTPATIENT)
Dept: ENDOCRINOLOGY | Facility: CLINIC | Age: 28
End: 2023-09-01
Payer: MEDICAID

## 2023-09-01 DIAGNOSIS — E05.00 GRAVES DISEASE: ICD-10-CM

## 2023-09-04 RX ORDER — METHIMAZOLE 5 MG/1
5 TABLET ORAL DAILY
Qty: 30 TABLET | Refills: 5 | Status: SHIPPED | OUTPATIENT
Start: 2023-09-04 | End: 2023-11-28

## 2023-10-26 ENCOUNTER — OFFICE VISIT (OUTPATIENT)
Dept: INTERNAL MEDICINE | Facility: CLINIC | Age: 28
End: 2023-10-26
Payer: MEDICAID

## 2023-10-26 VITALS
HEIGHT: 60 IN | RESPIRATION RATE: 18 BRPM | WEIGHT: 107.38 LBS | DIASTOLIC BLOOD PRESSURE: 68 MMHG | SYSTOLIC BLOOD PRESSURE: 102 MMHG | BODY MASS INDEX: 21.08 KG/M2 | TEMPERATURE: 99 F | OXYGEN SATURATION: 99 % | HEART RATE: 94 BPM

## 2023-10-26 DIAGNOSIS — R10.11 ABDOMINAL PAIN, CHRONIC, RIGHT UPPER QUADRANT: Primary | ICD-10-CM

## 2023-10-26 DIAGNOSIS — G89.29 ABDOMINAL PAIN, CHRONIC, RIGHT UPPER QUADRANT: Primary | ICD-10-CM

## 2023-10-26 PROCEDURE — 99215 OFFICE O/P EST HI 40 MIN: CPT | Mod: PBBFAC

## 2023-10-26 NOTE — PROGRESS NOTES
Gi  [facility] INTERNAL MEDICINE  OUTPATIENT OFFICE VISIT NOTE    SUBJECTIVE:      Chief Complaint: Establish Care (Patient states she has intermittent abdomen pain, nausea/vomiting, and bloating x 3 years.)       HPI: Royce Quigley is a 28 y.o. yo female w/ PMH of Graves disease, chronic abdominal pain, and depression/anxiety who presents for establishment with myself.  Patient reports a significant amount of medical history with multiple complaints on presentation today.  Per patient she has been seen by several specialists in the past including GI, cardiology, and vascular surgery with no resolution in symptoms.  She reports chronic abdominal pain over the last 3 years mostly located in her left upper quadrant and epigastric region associated with swelling and episodes of severe pain.  She has had multiple studies over the past year including CT abdomen/pelvis, CTA abdomen/pelvis, and abdominal US with no reported change.  She has a history of an exploratory laparotomy where she reportedly had a hemorrhage of an ovarian cyst requiring evacuation of a large amount of intraperitoneal fluid.   She also reports being diagnosed with POTS and nutcracker syndrome in the past but denies any treatment.  She also states she has a history of endocarditis for which she previously completed antibiotics for and was following with cardiology outpatient. She is also endorsing a 10 pound weight loss over the last 3 years with an inability to gain weight.  She reports abnormal bowel movements that occur daily.  She reports current abdominal pain 6/10.  She received EGD from Dr. Galindo earlier in the year with results showing esophagitis but no other abnormalities noted on report. She has been on several medications for abdominal pain including PPI's, pepcid, zofran, sucralfate with no resolution in symptoms.  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  She follows with psychiatrist for related issues and is currently on xanax PRN as well as duloxetine.        Past Medical History:   has a past medical history of Anxiety disorder, unspecified, Depression, and Graves disease.     Past Surgical History:   has a past surgical history that includes Tonsillectomy; DX-LAP, EVACUATION HEMOPERITONEUM (11/30/2021); Cholecystectomy; and Laparoscopic cholecystectomy (N/A, 10/10/2022).     Family History:  family history includes Bipolar disorder in her mother; Heart attack in her father; Heart disease in her father; Irritable bowel syndrome in her mother.     Social History:   reports that she has been smoking cigarettes. She started smoking about 15 years ago. She has a 29.8 pack-year smoking history. She has never been exposed to tobacco smoke. She has never used smokeless tobacco. She reports that she does not currently use drugs. She reports that she does not drink alcohol.     Allergies:  is allergic to diphenhydramine and diphenhydramine hcl.     Home Medications:  Prior to Admission medications    Medication Sig Start Date End Date Taking? Authorizing Provider   ALPRAZolam (XANAX) 1 MG tablet Take 1 mg by mouth daily as needed. 5/9/22   Provider, Historical   bisacodyL (DULCOLAX) 5 mg EC tablet Take 1 tablet (5 mg total) by mouth daily as needed for Constipation. 8/11/23   Erasmo Kilpatrick, DO   chlorhexidine (PERIDEX) 0.12 % solution 5 mLs 3 (three) times daily. 4/5/22   Provider, Historical   clindamycin (CLEOCIN) 150 MG capsule Take 150 mg by mouth every 6 (six) hours. Times 10 days    Provider, Historical   dicyclomine (BENTYL) 20 mg tablet  7/17/22   Provider, Historical   DULoxetine (CYMBALTA) 60 MG capsule Take 60 mg by mouth once daily. 8/5/19   Provider, Historical   famotidine (PEPCID) 20 MG tablet Take 1 tablet (20 mg total) by mouth 2 (two) times daily. for 10 days 8/11/23 8/21/23  Erasmo Kilpatrick, DO   FEROSUL 325 mg (65 mg iron) Tab  tablet Take by mouth. 11/23/22   Provider, Historical   fluticasone propionate (FLONASE) 50 mcg/actuation nasal spray 2 sprays by Each Nostril route. 8/1/22   Provider, Historical   gabapentin (NEURONTIN) 300 MG capsule Take 1 capsule (300 mg total) by mouth 3 (three) times daily. for 10 days 10/11/22 10/21/22  Uriel Carver MD   methIMAzole (TAPAZOLE) 5 MG Tab Take 1 tablet (5 mg total) by mouth once daily. 9/4/23   Meredith Sales, NP   omeprazole (PRILOSEC) 40 MG capsule Take 40 mg by mouth. 1/9/23   Provider, Historical   ondansetron (ZOFRAN-ODT) 4 MG TbDL Take 4 mg by mouth every 6 (six) hours as needed. 11/30/21   Provider, Historical   ondansetron (ZOFRAN-ODT) 4 MG TbDL Take 1 tablet (4 mg total) by mouth every 6 (six) hours as needed (nausea). 8/11/23   Erasmo Kilpatrick,    propranolol (INDERAL) 10 MG tablet Take 10 mg by mouth 2 (two) times daily. 8/5/19   Provider, Historical   SUTAB 1.479-0.188- 0.225 gram tablet  8/30/22   Provider, Historical     Review of Systems   Constitutional:  Positive for malaise/fatigue and weight loss.   Respiratory:  Negative for shortness of breath.    Cardiovascular:  Positive for chest pain and palpitations.   Gastrointestinal:  Positive for abdominal pain, constipation and diarrhea. Negative for nausea and vomiting.   Genitourinary:  Negative for dysuria.   Musculoskeletal:  Positive for back pain and myalgias.   Psychiatric/Behavioral:  Positive for depression. The patient is nervous/anxious.          OBJECTIVE:     Vital signs:   /68 (BP Location: Left arm, Patient Position: Sitting, BP Method: Medium (Automatic))   Pulse 94   Temp 98.6 °F (37 °C) (Oral)   Resp 18   Ht 5' (1.524 m)   Wt 48.7 kg (107 lb 6.4 oz)   LMP 10/02/2023 (Approximate)   SpO2 99%   BMI 20.98 kg/m²      Physical Examination:  General: Patient resting comfortably, in no acute distress   Eye: PERRLA, EOMI, clear conjunctiva, eyelids normal  HENT: Head-normocephalic and  atraumatic  Neck: full range of motion, no thyromegaly or lymphadenopathy, trachea midline, supple, no palpable thyroid nodules  Respiratory: clear to auscultation bilaterally without wheezes, rales, rhonchi  Cardiovascular: regular rate and rhythm without murmurs.  No gallops or rubs no JVD.  Capillary refill within normal limits.  Gastrointestinal: soft, tender to palpation over epigastric region and left upper quadrant, non-distended with normal bowel sounds, without masses to palpation.  No hernia noted with valsalva   Genitourinary: no CVA tenderness to palpation  Musculoskeletal: full range of motion of all extremities/spine without limitation or discomfort  Integumentary: no rashes or skin lesions present  Neurologic: no signs of peripheral neurological deficit, motor/sensory function intact  Psychiatric:  alert and oriented, cognitive function intact, cooperative with exam, good eye contact, judgement and insight intact, mood and affect full range.     Labs:  CMP:   Lab Results   Component Value Date    GLUCOSE 101 (H) 08/18/2023    CALCIUM 9.3 08/18/2023    ALBUMIN 4.2 08/18/2023     08/18/2023    K 3.5 08/18/2023    CO2 24 08/18/2023     03/02/2023    BUN 9.5 08/18/2023    CREATININE 0.69 08/18/2023    ALKPHOS 60 08/18/2023    ALT 9 08/18/2023    AST 11 08/18/2023    BILITOT 0.3 08/18/2023      CBC:   Lab Results   Component Value Date    WBC 10.24 08/18/2023    HGB 11.5 (L) 08/18/2023    HCT 35.8 (L) 08/18/2023    MCV 87.7 08/18/2023    RDW 13.8 08/18/2023     FLP:   Lab Results   Component Value Date    CHOL 128 12/03/2021    HDL 44 12/03/2021    LDL 72.00 12/03/2021    TRIG 58 12/03/2021    TOTALCHOLEST 3 12/03/2021     Coagulation:   Lab Results   Component Value Date    INR 0.96 04/04/2018     DM:   Lab Results   Component Value Date    HGBA1C 4.8 01/19/2023    EAG 91.1 01/19/2023    CREATININE 0.69 08/18/2023     Thyroid:   Lab Results   Component Value Date    TSH 1.926 08/18/2023     [id number] 0.79 07/24/2023    N6HAMHP 7.5 08/29/2019     LFTs:   Lab Results   Component Value Date    LABPROT 7.5 08/18/2023    ALBUMIN 4.2 08/18/2023    AST 11 08/18/2023    ALT 9 08/18/2023    ALKPHOS 60 08/18/2023     Anemia:   Lab Results   Component Value Date    EDVTKRQX34 529 02/05/2018    FOLATE 10.6 02/05/2018       ASSESSMENT & PLAN:     Graves disease  Multinodular goiter   Palpitations   -following with endocrinology, last seen 7/26/2023  -Soft tissue US 7/31/2023: There is heterogenicity in increased vascularity of the thyroid within the right hemithyroid there is a 6 mm x 3 mm x 6 mm and a 5 mm x 3 mm x 3 mm new nodule both of these nodules are subcentimeter and do not meet criteria for biopsy and or surveillance there appear to be stable as compared with the previous exam.  -continue methimazole 5mg and propranolol 10mg per endocrine recs     Anxiety/depression   -following with outside psychiatrist     Chronic abdominal pain   -Patient has had multiple imaging modalities performed over the last year with no significant explanation for her symptoms   -is requesting to repeat CT and abdominal US today for further workup. Will order a CV US mesenteric artery today as it does not appear this has been performed in the past per chart review   -patient also requesting to be seen by GI in this facility.  Will place referral today.  Patient previously following with both Dr. Galindo and Dr. Hernandes   -ordering lab work today for further workup   -has been evaluated by GYN, vascular surgery, cardiology, and GI in the past per patient with no further recommendations.    -this appears to be a complicated situation that has had extensive workup performed in the past. Unclear etiology at this time.  Will continue to monitor.     Will discuss health maintenance at next visit.   Return to clinic in 1 month(s).    Courtney Dhaliwal MD  [facility], PGY-2

## 2023-10-27 NOTE — PROGRESS NOTES
I have reviewed and concur with the resident's history, physical, assessment, and plan.  I have discussed with him all issues related to the diagnosis, workup and treatment plan. Care provided as reasonable and necessary.  [facility] gi[facility] MD  [facility]

## 2023-10-30 ENCOUNTER — APPOINTMENT (OUTPATIENT)
Dept: LAB | Facility: HOSPITAL | Age: 28
End: 2023-10-30
Payer: MEDICAID

## 2023-11-01 DIAGNOSIS — R10.11 ABDOMINAL PAIN, CHRONIC, RIGHT UPPER QUADRANT: Primary | ICD-10-CM

## 2023-11-01 DIAGNOSIS — G89.29 ABDOMINAL PAIN, CHRONIC, RIGHT UPPER QUADRANT: Primary | ICD-10-CM

## 2023-11-08 ENCOUNTER — TELEPHONE (OUTPATIENT)
Dept: GASTROENTEROLOGY | Facility: CLINIC | Age: 28
End: 2023-11-08
Payer: MEDICAID

## 2023-11-08 NOTE — TELEPHONE ENCOUNTER
----- Message from Nola Kaur sent at 11/8/2023 10:23 AM CST -----  Regarding: reschedule needed  Contact: [phone]  JOHN JIMENEZ calling regarding Appointment Access  (message) for reschedule due to car trouble.  next available appt isn't until Jan. 2024.  Pt would like to be seen sooner.  Pt live in [place].

## 2023-11-13 ENCOUNTER — TELEPHONE (OUTPATIENT)
Dept: GASTROENTEROLOGY | Facility: CLINIC | Age: 28
End: 2023-11-13
Payer: MEDICAID

## 2023-11-13 NOTE — TELEPHONE ENCOUNTER
----- Message from Loren Laurent sent at 11/13/2023 10:33 AM CST -----  Contact: Karen  Type:  Needs Medical Advice    Who Called: [facility]   Symptoms (please be specific): they are needing information to send a referral for this pt to the the [facility]  address fax NPI Name of Clinic to send    Would the patient rather a call back or a response via [facility]? call  Best Call Back Number: [phone]  Additional Information: v

## 2023-11-20 DIAGNOSIS — D50.9 IRON DEFICIENCY ANEMIA: Primary | ICD-10-CM

## 2023-11-28 ENCOUNTER — OFFICE VISIT (OUTPATIENT)
Dept: ENDOCRINOLOGY | Facility: CLINIC | Age: 28
End: 2023-11-28
Payer: MEDICAID

## 2023-11-28 DIAGNOSIS — R00.2 PALPITATIONS: ICD-10-CM

## 2023-11-28 DIAGNOSIS — F41.9 ANXIETY: ICD-10-CM

## 2023-11-28 DIAGNOSIS — E05.00 GRAVES DISEASE: Primary | ICD-10-CM

## 2023-11-28 PROCEDURE — 99214 PR OFFICE/OUTPT VISIT, EST, LEVL IV, 30-39 MIN: ICD-10-PCS | Mod: 95,,, | Performed by: NURSE PRACTITIONER

## 2023-11-28 PROCEDURE — 1159F MED LIST DOCD IN RCRD: CPT | Mod: CPTII,95,, | Performed by: NURSE PRACTITIONER

## 2023-11-28 PROCEDURE — 3044F PR MOST RECENT HEMOGLOBIN A1C LEVEL <7.0%: ICD-10-PCS | Mod: CPTII,95,, | Performed by: NURSE PRACTITIONER

## 2023-11-28 PROCEDURE — 99214 OFFICE O/P EST MOD 30 MIN: CPT | Mod: 95,,, | Performed by: NURSE PRACTITIONER

## 2023-11-28 PROCEDURE — 1159F PR MEDICATION LIST DOCUMENTED IN MEDICAL RECORD: ICD-10-PCS | Mod: CPTII,95,, | Performed by: NURSE PRACTITIONER

## 2023-11-28 PROCEDURE — 3044F HG A1C LEVEL LT 7.0%: CPT | Mod: CPTII,95,, | Performed by: NURSE PRACTITIONER

## 2023-11-28 RX ORDER — ACETAMINOPHEN 500 MG
500 TABLET ORAL EVERY 8 HOURS PRN
COMMUNITY

## 2023-11-28 RX ORDER — ASPIRIN 81 MG
TABLET, DELAYED RELEASE (ENTERIC COATED) ORAL
COMMUNITY
End: 2024-03-05

## 2023-11-28 RX ORDER — ALPRAZOLAM 0.5 MG/1
TABLET ORAL 2 TIMES DAILY
COMMUNITY

## 2023-11-28 RX ORDER — METHIMAZOLE 5 MG/1
TABLET ORAL
COMMUNITY
End: 2024-03-05

## 2023-11-28 RX ORDER — LAMOTRIGINE 25 MG/1
TABLET ORAL
COMMUNITY
End: 2024-03-05

## 2023-11-28 RX ORDER — PANTOPRAZOLE SODIUM 40 MG/1
TABLET, DELAYED RELEASE ORAL
COMMUNITY
End: 2024-03-05

## 2023-11-28 RX ORDER — PHENYLPROPANOLAMINE/CLEMASTINE 75-1.34MG
TABLET, EXTENDED RELEASE ORAL
COMMUNITY

## 2023-11-28 RX ORDER — BACLOFEN 20 MG/1
TABLET ORAL
COMMUNITY
End: 2024-03-05

## 2023-11-28 NOTE — PROGRESS NOTES
Subjective     Patient ID: Royce Quigley is a 28 y.o. female.    Chief Complaint: No chief complaint on file.    Previous Endocrine Clinic Note:      10/15/2020 Telemedicine Visit Note- Endocrine Clinic: 26 y/o female schedule for endocrine clinic F/U for Graves disease. Pt is 7 weeks post-partum and currently on MMI 5mg daily. TSH 0.043 Free T4 1.20 T4 9.1.  Patient reports a couple of weeks after pregnancy and she felt like she was hyperthyroid and had an old prescription of MMI at home and started on 5 mg, then she states 3 weeks ago she went to her primary care provider and was prescribed a new prescription of MMI 5 mg.  She states she has been on the new prescription that is not  for approximately 3 weeks.  She reports that her symptoms of palpitations, tremors and anxiety have slightly improved but she still continues with palpitations, weight loss.     2021 Endocrine Clinic Note: 26 year old female scheduled today's endocrine clinic follow-up for Graves' disease.  Patient was previously in remission with pregnancy and delivered 2020.  4 to 5 weeks after pregnancy patient felt like she was returning to hyperthyroidism and had restarted her MMI. Last documentation 2020 patient's TSH was 10.521 MMI 10 mg twice a day was reduced to once a day. She is currently MMI 5mg (1/2 of 10mg tablet), She reports she was seeing a health care provider in [place] who changed her medication. Pt states she feel speedy now, with palpitations, anxiety. Pt weight has increased. Will check labs today. (1)     2021 Endocrine Clinic Note: 26 year old female scheduled today for Endocrine follow-up.  History of Graves' disease/palpitations.  Patient has continued her MMI 5 mg and states when she ran out she had an old prescription that was 2-3 years old that she restarted she states she is severely worried about going back into hyperthyroidism.  Current labs TSH 0.855, free T4 1.0015 mg of MMI.   Discussed with patient holding MMI to assess for remission patient states that she has a great fear from returning to hyperthyroidism and she is currently symptomatic with weight loss and palpitations and does not want to stop her MMI.  Instructed patient we will decrease to a half of a tablet and recheck labs in 3 weeks and to reconsider holding MMI.  Tobacco use patient smokes half a pack per day encourage cessation. (1)     3/15/2022 endocrine clinic note: 27-year-old female scheduled today for endocrine clinic follow-up. History of Graves' disease/palpitations. Graves' disease patient is currently in MMI 5 mg most recent TSH On 01/25/2022 TSH 2.2292.  Patient reports continued palpitations, excessive sweating and reports weight loss.  Patient also has a history of anxiety.  Patient is currently undergoing a cardiac work-up for palpitations and also was referred to gastro for an upper scope but has not completed yet. Enlarged thyroid multinodular goiter patient had ultrasound 6/28/2019 IMPRESSION: Heterogenicity in hypervascularity of the thyroid gland might be related to thyroid disease. Subcentimeter nodules as above do not meet criteria for biopsy.      Endocrine Clinic Note 07/19/2022: 27-year-old female scheduled today for endocrine clinic follow-up. History of Graves disease, multinodular goiter, enlarged thyroid, palpitations. Previous TSH 2.2292 on 01/25/2022 patient was previously held off MI and reported symptoms of palpitation, increased anxiety and tremors. Currently she is on MMI 5 mg. Palpitations patient reports occasional palpitations she states when she went to her PCP 1 week ago her  today patient's heart rate is 92 patient states compliance with her propanolol. Multinodular goiter/enlarged thyroid repeat ultrasound 03/23/2022 Mild diffuse heterogeneity and hypervascularity at the thyroid gland suggesting diffuse thyroiditis with several small benign sub centimeter nodules as detailed  above. No significant interval change compared to the thyroid ultrasound in 2019 aside from interval decrease in size of the left thyroid nodule.  Patient denies any new palpable nodules but does report mild dysphagia at times. Tobacco use patient is smoking 1 pack per day states she does not want states patient's program at this time she states she is not want to quit due to her anxiety.     Endocrine Clinic Note 01/19/2023:  27-year-old female scheduled today for endocrine clinic follow-up.  History of Graves disease, multinodular goiter and enlarged thyroid, palpitations. TSH 2.0166, Free T3 2.98, Free T4 0.91 on 07/19/2022 patient states when MMI stops she continue with palpitations and extreme anxiety increased symptoms of hyperthyroidism.  Patient remained on MMI 5 mg reports to clinic today and reports occasional palpitations and anxiety.  Patient's low blood pressure today for took a Xanax just prior to her visit along with propranolol 10 mg last night.  Patient currently being followed by cardiologist.  Multinodular goiter repeat ultrasound 03/23/2022 IMPRESSION: Mild diffuse heterogeneity and hypervascularity at the thyroid gland suggesting diffuse thyroiditis with several small benign subcentimeter nodules as detailed above. No significant interval change compared to the thyroid ultrasound in 2019 aside from interval decrease in size of the left thyroid nodule.  Patient is concerned developing type 1 diabetes for grandmother has a history of type 1 diabetes and discussing today like she is pancreatic insufficiency.  Patient's recent glucose of 139 previous glucoses were within normal limits.      Endocrine clinic note 07/24/2023:  28-year-old female day endocrine clinic follow-up.  History of Graves disease, multinodular goiter enlarged thyroid, palpitations.  TSH 2.769, Free T4 1.00, Free T3 3.05 on 01/19/2023.  Patient is currently on MMI 5 mg Patient states when dose is lowered she has symptoms of  palpitations symptoms of hyperthyroidism.  Multinodular goiter ultrasound 03/23/2023 IMPRESSION: Mild diffuse heterogeneity and hypervascularity at the thyroid gland suggesting diffuse thyroiditis with several small benign subcentimeter nodules as detailed above. No significant interval change compared to the thyroid ultrasound in 2019 aside from interval decrease in size of the left thyroid nodule.  Patient denies any palpable nodules but she does note palpable lymph node close to her right ear that has been palpable she states for over a year nontender.          The patient location is: Home   The chief complaint leading to consultation is:  Graves disease/hyperthyroidism    Visit type: audiovisual    Face to Face time with patient: 15  30 minutes of total time spent on the encounter, which includes face to face time and non-face to face time preparing to see the patient (eg, review of tests), Obtaining and/or reviewing separately obtained history, Documenting clinical information in the electronic or other health record, Independently interpreting results (not separately reported) and communicating results to the patient/family/caregiver, or Care coordination (not separately reported).         Each patient to whom he or she provides medical services by telemedicine is:  (1) informed of the relationship between the physician and patient and the respective role of any other health care provider with respect to management of the patient; and (2) notified that he or she may decline to receive medical services by telemedicine and may withdraw from such care at any time.    Notes:  Endocrine clinic note 11/28/2023:  28-year-old female scheduled today for endocrine clinic follow-up.  History of Graves disease, multinodular goiter with enlarged thyroid and palpitations. Graves disease pt is on MMI 2.5 mg previous TSH 4.623, free T3 2.87, Free T4 0.79 on 07/24/2023 MMI reduced to half a tablet at that time.  Attempted to  DC methimazole of increased anxiety and palpitations once off methimazole.  Pt takes Xanax for her anxiety.  Patient reports enlarged thyroid which is comfortable she states she is considering a thyroidectomy but wants to wait until her stomach issues resolved.  Discussed with the patient if she desires thyroidectomy we will hold MMI to see if she goes hyperthyroid before thyroidectomy can be done.           Narrative & Impression  Thyroid ultrasound     INDICATION: 27-year-old woman with enlarged thyroid gland/nodules.     TECHNIQUE: Real-time grayscale and color Doppler sonographic  evaluation of the thyroid gland was performed per routine protocol.     COMPARISON: Thyroid ultrasound 6/28/2019.     FINDINGS:     There is mild enlargement at the right thyroid lobe with the gland  otherwise normal and size and contour. The right thyroid lobe measures  5.4 x 1.6 x 1.9 cm and the left lobe 4.5 x 1.6 x 1.4 cm. The isthmus  measures just under 0.2 cm in AP thickness. A small colloid cyst at  the right thyroid lobe measures 5 x 3 x 4 mm and a second small and  well marginated solid hypoechoic nodule at the right posterior thyroid  lobe 5 x 3 x 5 mm. A single small colloid cyst in the left thyroid  lobe measures 3 x 2 x 3 mm. There is mild diffuse heterogeneity of the  thyroid gland parenchyma along with diffuse hypervascularity at the  gland.     IMPRESSION:     Mild diffuse heterogeneity and hypervascularity at the thyroid gland  suggesting diffuse thyroiditis with several small benign subcentimeter  nodules as detailed above. No significant interval change compared to  the thyroid ultrasound in 2019 aside from interval decrease in size of  the left thyroid nodule.  Electronically Signed By: Erasmo Pa MD  Date/Time Signed: 03/23/2022 16:27     Specimen Collected: 03/23/22 14:13 Last Resulted: 03/23/22 14:13                 Review of Systems   Constitutional:  Positive for fatigue and unexpected weight change.  Negative for activity change and appetite change.   HENT:  Positive for goiter. Negative for dental problem, hearing loss, tinnitus and trouble swallowing.    Eyes:  Negative for photophobia, pain and visual disturbance.   Respiratory:  Negative for cough, chest tightness and wheezing.    Cardiovascular:  Negative for chest pain, palpitations and leg swelling.   Gastrointestinal:  Negative for abdominal pain, constipation, diarrhea, nausea and reflux.   Endocrine: Positive for heat intolerance. Negative for cold intolerance, polydipsia and polyphagia.   Genitourinary:  Negative for difficulty urinating, flank pain, hematuria, hot flashes, menstrual irregularity, menstrual problem, nocturia and urgency.   Musculoskeletal:  Negative for back pain, gait problem, joint swelling, leg pain and joint deformity.   Integumentary:  Negative for color change, pallor, rash and breast discharge.   Allergic/Immunologic: Negative for environmental allergies, food allergies and immunocompromised state.   Neurological:  Negative for tremors, seizures, headaches, memory loss and coordination difficulties.   Psychiatric/Behavioral:  Positive for sleep disturbance. Negative for agitation and behavioral problems. The patient is nervous/anxious.           Objective     Physical Exam  Constitutional:       Appearance: Normal appearance.   HENT:      Head: Normocephalic.      Nose: Nose normal.   Eyes:      Conjunctiva/sclera: Conjunctivae normal.   Pulmonary:      Effort: Pulmonary effort is normal.   Musculoskeletal:      Cervical back: Normal range of motion.   Neurological:      Mental Status: She is alert.   Psychiatric:         Mood and Affect: Mood normal.         Behavior: Behavior normal.         Thought Content: Thought content normal.         Judgment: Judgment normal.            Assessment and Plan     1. Graves disease  On MMI 2.5 mg   TSH 4.623, Free T3 2.87, Free T4 0.79 on 07/24/2023 MMI reduced to half a tablet at that  time  Component Ref Range & Units 4 mo ago  (7/24/23) 10 mo ago  (1/19/23) 1 yr ago  (7/19/22) 1 yr ago  (5/15/22) 1 yr ago  (1/25/22) 1 yr ago  (12/19/21) 2 yr ago  (9/30/21)   TSH 0.350 - 4.940 uIU/mL 4.623 2.769 2.0166 R 1.900 R 2.2292 R 1.2144 R 1.5176 R     Component Ref Range & Units 4 mo ago  (7/24/23) 10 mo ago  (1/19/23) 1 yr ago  (7/19/22) 2 yr ago  (3/22/21) 3 yr ago  (3/23/20) 3 yr ago  (2/20/20) 3 yr ago  (1/28/20)   T3 Free 1.58 - 3.91 pg/mL 2.87 3.05 R 2.98 R 3.02 R 2.61 R 2.90 R 2.76 R     Component Ref Range & Units 4 mo ago  (7/24/23) 10 mo ago  (1/19/23) 1 yr ago  (7/19/22) 1 yr ago  (12/19/21) 2 yr ago  (9/30/21) 2 yr ago  (9/16/21) 2 yr ago  (5/5/21)   Thyroxine Free 0.70 - 1.48 ng/dL 0.79 1.00 0.91 0.91 1.00 0.97 0.75               Component Ref Range & Units 4 mo ago   Thyrotropin Receptor Ab 0.00 - 1.75 IU/L 3.25 High              Component Ref Range & Units 4 mo ago   TPO Ab Quant <25 IU/mL 1,313 High                  Component Ref Range & Units 4 mo ago   Thyroid-Stimulating Immunoglob <=1.3 TSI index 1.2            -     T4, Free; Future; Expected date: 11/28/2023  -     T3, Free ([facility]); Future; Expected date: 11/28/2023  -     TSH; Future; Expected date: 11/28/2023  -     Thyrotropin Receptor Antibody; Future; Expected date: 11/28/2023  -     Thyroid Stimulating Immunoglobulin; Future; Expected date: 11/28/2023    2. Anxiety  Uses Xanax p.r.n.  Practice deep breathing or abdominal breathing exercises when anxiety occurs  Exercise daily, Get sunlight daily  Avoid caffeine, alcohol and stimulants which can worsen anxiety   Practice positive phrases and repeat throughout the day  Yoga, lavender scents or Chamomile tea also will help anxiety  Set healthy boundaries, avoid people, news programs and conversations that increase stress    3. Palpitations  No medication at this time      Follow up in about 3 months (around 2/28/2024) for Graves, Hyperthyroidism, virtual visit.    I spent a total of  30 minutes on the day of the visit.  This includes face to face time and non-face to face time preparing to see the patient (eg, review of tests), obtaining and/or reviewing separately obtained history, documenting clinical information in the electronic or other health record, independently interpreting results and communicating results to the patient/family/caregiver, or care coordinator.

## 2023-12-07 RX ORDER — PROPRANOLOL HYDROCHLORIDE 10 MG/1
10 TABLET ORAL DAILY
COMMUNITY

## 2024-01-08 ENCOUNTER — HOSPITAL ENCOUNTER (EMERGENCY)
Facility: HOSPITAL | Age: 29
Discharge: HOME | End: 2024-01-08
Attending: FAMILY MEDICINE
Payer: MEDICAID

## 2024-01-08 VITALS
BODY MASS INDEX: 21.9 KG/M2 | OXYGEN SATURATION: 99 % | RESPIRATION RATE: 17 BRPM | TEMPERATURE: 98 F | SYSTOLIC BLOOD PRESSURE: 103 MMHG | WEIGHT: 111.56 LBS | HEIGHT: 60 IN | HEART RATE: 63 BPM | DIASTOLIC BLOOD PRESSURE: 74 MMHG

## 2024-01-08 DIAGNOSIS — R07.9 CHEST PAIN: ICD-10-CM

## 2024-01-08 DIAGNOSIS — K29.70 GASTRITIS, PRESENCE OF BLEEDING UNSPECIFIED, UNSPECIFIED CHRONICITY, UNSPECIFIED GASTRITIS TYPE: Primary | ICD-10-CM

## 2024-01-08 DIAGNOSIS — R10.13 EPIGASTRIC ABDOMINAL PAIN: ICD-10-CM

## 2024-01-08 DIAGNOSIS — R07.89 ATYPICAL CHEST PAIN: ICD-10-CM

## 2024-01-08 LAB
ALBUMIN SERPL-MCNC: 3.9 G/DL (ref 3.5–5)
ALBUMIN/GLOB SERPL: 1.3 RATIO (ref 1.1–2)
ALP SERPL-CCNC: 54 UNIT/L (ref 40–150)
ALT SERPL-CCNC: 12 UNIT/L (ref 0–55)
AMORPH URATE CRY URNS QL MICRO: ABNORMAL /UL
APPEARANCE UR: ABNORMAL
AST SERPL-CCNC: 12 UNIT/L (ref 5–34)
B-HCG UR QL: NEGATIVE
BACTERIA #/AREA URNS AUTO: ABNORMAL /HPF
BASOPHILS # BLD AUTO: 0.07 X10(3)/MCL
BASOPHILS NFR BLD AUTO: 1 %
BILIRUB SERPL-MCNC: 0.3 MG/DL
BILIRUB UR QL STRIP.AUTO: NEGATIVE
BUN SERPL-MCNC: 10.1 MG/DL (ref 7–18.7)
CALCIUM SERPL-MCNC: 9.1 MG/DL (ref 8.4–10.2)
CHLORIDE SERPL-SCNC: 107 MMOL/L (ref 98–107)
CO2 SERPL-SCNC: 25 MMOL/L (ref 22–29)
COLOR UR AUTO: YELLOW
CREAT SERPL-MCNC: 0.74 MG/DL (ref 0.55–1.02)
CTP QC/QA: YES
EOSINOPHIL # BLD AUTO: 0.26 X10(3)/MCL (ref 0–0.9)
EOSINOPHIL NFR BLD AUTO: 3.5 %
ERYTHROCYTE [DISTWIDTH] IN BLOOD BY AUTOMATED COUNT: 14.6 % (ref 11.5–17)
GFR SERPLBLD CREATININE-BSD FMLA CKD-EPI: >60 MLS/MIN/1.73/M2
GLOBULIN SER-MCNC: 3.1 GM/DL (ref 2.4–3.5)
GLUCOSE SERPL-MCNC: 88 MG/DL (ref 74–100)
GLUCOSE UR QL STRIP.AUTO: NEGATIVE
HCT VFR BLD AUTO: 36 % (ref 37–47)
HGB BLD-MCNC: 11.3 G/DL (ref 12–16)
HOLD SPECIMEN: NORMAL
IMM GRANULOCYTES # BLD AUTO: 0.01 X10(3)/MCL (ref 0–0.04)
IMM GRANULOCYTES NFR BLD AUTO: 0.1 %
KETONES UR QL STRIP.AUTO: 5
LEUKOCYTE ESTERASE UR QL STRIP.AUTO: 25
LIPASE SERPL-CCNC: 12 U/L
LYMPHOCYTES # BLD AUTO: 3.38 X10(3)/MCL (ref 0.6–4.6)
LYMPHOCYTES NFR BLD AUTO: 46 %
MCH RBC QN AUTO: 27.4 PG (ref 27–31)
MCHC RBC AUTO-ENTMCNC: 31.4 G/DL (ref 33–36)
MCV RBC AUTO: 87.2 FL (ref 80–94)
MONOCYTES # BLD AUTO: 0.6 X10(3)/MCL (ref 0.1–1.3)
MONOCYTES NFR BLD AUTO: 8.2 %
NEUTROPHILS # BLD AUTO: 3.02 X10(3)/MCL (ref 2.1–9.2)
NEUTROPHILS NFR BLD AUTO: 41.2 %
NITRITE UR QL STRIP.AUTO: NEGATIVE
NRBC BLD AUTO-RTO: 0 %
PH UR STRIP.AUTO: 7.5 [PH]
PLATELET # BLD AUTO: 258 X10(3)/MCL (ref 130–400)
PMV BLD AUTO: 11.3 FL (ref 7.4–10.4)
POTASSIUM SERPL-SCNC: 3.8 MMOL/L (ref 3.5–5.1)
PROT SERPL-MCNC: 7 GM/DL (ref 6.4–8.3)
PROT UR QL STRIP.AUTO: ABNORMAL
RBC # BLD AUTO: 4.13 X10(6)/MCL (ref 4.2–5.4)
RBC UR QL AUTO: NEGATIVE
SODIUM SERPL-SCNC: 139 MMOL/L (ref 136–145)
SP GR UR STRIP.AUTO: 1.02 (ref 1–1.03)
UROBILINOGEN UR STRIP-ACNC: NORMAL
WBC # SPEC AUTO: 7.34 X10(3)/MCL (ref 4.5–11.5)
WBC #/AREA URNS AUTO: ABNORMAL /HPF

## 2024-01-08 PROCEDURE — 81025 URINE PREGNANCY TEST: CPT | Performed by: FAMILY MEDICINE

## 2024-01-08 PROCEDURE — 25000003 PHARM REV CODE 250: Performed by: FAMILY MEDICINE

## 2024-01-08 PROCEDURE — 96360 HYDRATION IV INFUSION INIT: CPT

## 2024-01-08 PROCEDURE — 85025 COMPLETE CBC W/AUTO DIFF WBC: CPT | Performed by: FAMILY MEDICINE

## 2024-01-08 PROCEDURE — 63600175 PHARM REV CODE 636 W HCPCS: Performed by: FAMILY MEDICINE

## 2024-01-08 PROCEDURE — 81001 URINALYSIS AUTO W/SCOPE: CPT | Performed by: FAMILY MEDICINE

## 2024-01-08 PROCEDURE — 93005 ELECTROCARDIOGRAM TRACING: CPT

## 2024-01-08 PROCEDURE — 83690 ASSAY OF LIPASE: CPT | Performed by: FAMILY MEDICINE

## 2024-01-08 PROCEDURE — 80053 COMPREHEN METABOLIC PANEL: CPT | Performed by: FAMILY MEDICINE

## 2024-01-08 PROCEDURE — 99284 EMERGENCY DEPT VISIT MOD MDM: CPT | Mod: 25

## 2024-01-08 RX ORDER — DICYCLOMINE HYDROCHLORIDE 10 MG/1
20 CAPSULE ORAL
Status: COMPLETED | OUTPATIENT
Start: 2024-01-08 | End: 2024-01-08

## 2024-01-08 RX ORDER — MAG HYDROX/ALUMINUM HYD/SIMETH 200-200-20
30 SUSPENSION, ORAL (FINAL DOSE FORM) ORAL
Status: DISCONTINUED | OUTPATIENT
Start: 2024-01-08 | End: 2024-01-08

## 2024-01-08 RX ORDER — PANTOPRAZOLE SODIUM 40 MG/1
40 TABLET, DELAYED RELEASE ORAL DAILY
Qty: 42 TABLET | Refills: 0 | Status: SHIPPED | OUTPATIENT
Start: 2024-01-08 | End: 2024-02-19

## 2024-01-08 RX ORDER — MAG HYDROX/ALUMINUM HYD/SIMETH 200-200-20
30 SUSPENSION, ORAL (FINAL DOSE FORM) ORAL
Status: COMPLETED | OUTPATIENT
Start: 2024-01-08 | End: 2024-01-08

## 2024-01-08 RX ORDER — FAMOTIDINE 20 MG/1
20 TABLET, FILM COATED ORAL 2 TIMES DAILY
Qty: 28 TABLET | Refills: 0 | Status: SHIPPED | OUTPATIENT
Start: 2024-01-08 | End: 2024-01-22

## 2024-01-08 RX ADMIN — ALUMINUM HYDROXIDE, MAGNESIUM HYDROXIDE, AND SIMETHICONE 30 ML: 200; 200; 20 SUSPENSION ORAL at 01:01

## 2024-01-08 RX ADMIN — SODIUM CHLORIDE, POTASSIUM CHLORIDE, SODIUM LACTATE AND CALCIUM CHLORIDE 1000 ML: 600; 310; 30; 20 INJECTION, SOLUTION INTRAVENOUS at 01:01

## 2024-01-08 RX ADMIN — DICYCLOMINE HYDROCHLORIDE 20 MG: 10 CAPSULE ORAL at 02:01

## 2024-01-08 NOTE — ED PROVIDER NOTES
"Encounter Date: 1/8/2024       History     Chief Complaint   Patient presents with    Abdominal Pain    Chest Pain    Back Pain     States LUQ abdominal pain radiating "through to my back".  States all over chest at times.  Started lightly yesterday and last 3 hours worse.  Vomited 30 minutes ago.  EKG in Triage.      Vomiting     Patient 19-year-old female presents emergency room complaints of chest pain.  Patient reports that she was having epigastric abdominal pain with radiation into her chest.  Patient also reports at times having radiation into her back and also into her neck.  No associated shortness of breath.  No dysuria or hematuria.    The history is provided by the patient.     Review of patient's allergies indicates:   Allergen Reactions    Benadryl [diphenhydramine hcl] Anxiety     Hallucinations     Diphenhydramine Anxiety and Other (See Comments)     Past Medical History:   Diagnosis Date    Anxiety disorder, unspecified     Depression     Essential fatty acid deficiency     Gastric motility disorder     Graves disease     History of ovarian cyst     frequent, reoccurring with hx of rupture and hemorrhage    Iron deficiency anemia, unspecified     Pelvic congestion syndrome     Poor dentition     POTS (postural orthostatic tachycardia syndrome)     Smoker     Vitamin D deficiency      Past Surgical History:   Procedure Laterality Date    CHOLECYSTECTOMY      DX-LAP, EVACUATION HEMOPERITONEUM  11/30/2021    GALLBLADDER SURGERY      LAPAROSCOPIC CHOLECYSTECTOMY N/A 10/10/2022    Procedure: CHOLECYSTECTOMY, LAPAROSCOPIC;  Surgeon: Tobin Aguiar MD;  Location: [facility];  Service: General;  Laterality: N/A;    TONSILLECTOMY  11/2011     Family History   Problem Relation Age of Onset    Heart disease Paternal Grandfather     Heart disease Paternal Grandmother     Pancreatic cancer Maternal Grandmother     Ovarian cancer Maternal Grandmother     Heart disease Father     Heart attack Father     Coronary " artery disease Father     Cancer Mother         colorectal cancer    Bipolar disorder Mother     Irritable bowel syndrome Mother     No Known Problems Brother     Kidney disease Sister     No Known Problems Son      Social History     Tobacco Use    Smoking status: Every Day     Current packs/day: 1.50     Average packs/day: 1.3 packs/day for 30.0 years (38.0 ttl pk-yrs)     Types: Cigarettes     Start date: 2008     Passive exposure: Never    Smokeless tobacco: Never   Substance Use Topics    Alcohol use: Never    Drug use: Not Currently     Review of Systems   Constitutional:  Negative for chills, fatigue and fever.   HENT:  Negative for ear pain, rhinorrhea and sore throat.    Eyes:  Negative for photophobia and pain.   Respiratory:  Negative for cough, shortness of breath and wheezing.    Cardiovascular:  Positive for chest pain.   Gastrointestinal:  Positive for abdominal pain. Negative for diarrhea, nausea and vomiting.   Genitourinary:  Negative for dysuria.   Neurological:  Negative for dizziness, weakness and headaches.   All other systems reviewed and are negative.      Physical Exam     Initial Vitals [01/08/24 0039]   BP Pulse Resp Temp SpO2   114/77 73 18 98.2 °F (36.8 °C) 100 %      MAP       --         Physical Exam    Nursing note and vitals reviewed.  Constitutional: She appears well-developed and well-nourished. No distress.   HENT:   Head: Normocephalic and atraumatic.   Mouth/Throat: Oropharynx is clear and moist.   Eyes: Conjunctivae and EOM are normal. Pupils are equal, round, and reactive to light.   Neck: Neck supple.   Normal range of motion.  Cardiovascular:  Normal rate, regular rhythm, normal heart sounds and intact distal pulses.     Exam reveals no gallop and no friction rub.       No murmur heard.  Pulmonary/Chest: Breath sounds normal. No respiratory distress. She has no wheezes. She has no rhonchi. She has no rales.   Abdominal: Abdomen is soft. Bowel sounds are normal. She exhibits  no distension. There is abdominal tenderness.   Mild tenderness to palpation epigastric region.  No rebound or guarding.  Negative Painter sign.  Negative McBurney point tenderness. There is no rebound and no guarding.   Musculoskeletal:         General: Normal range of motion.      Cervical back: Normal range of motion and neck supple.     Neurological: She is alert and oriented to person, place, and time.   Skin: Skin is warm and dry. Capillary refill takes less than 2 seconds. No erythema.   Psychiatric: She has a normal mood and affect. Her behavior is normal. Judgment and thought content normal.         ED Course   Procedures  Labs Reviewed   URINALYSIS, REFLEX TO URINE CULTURE - Abnormal; Notable for the following components:       Result Value    Appearance, UA Turbid (*)     Protein, UA Trace (*)     Ketones, UA 5 (*)     Leukocyte Esterase, UA 25 (*)     Bacteria, UA Few (*)     All other components within normal limits   CBC WITH DIFFERENTIAL - Abnormal; Notable for the following components:    RBC 4.13 (*)     Hgb 11.3 (*)     Hct 36.0 (*)     MCHC 31.4 (*)     MPV 11.3 (*)     All other components within normal limits   LIPASE - Normal   COMPREHENSIVE METABOLIC PANEL   CBC W/ AUTO DIFFERENTIAL    Narrative:     The following orders were created for panel order CBC Auto Differential.  Procedure                               Abnormality         Status                     ---------                               -----------         ------                     CBC with Differential[[id number]]       Abnormal            Final result                 Please view results for these tests on the individual orders.   EXTRA TUBES    Narrative:     The following orders were created for panel order EXTRA TUBES.  Procedure                               Abnormality         Status                     ---------                               -----------         ------                     Light Blue Top Hold[[id number]]                              In process                 Gold Top Hold[[id number]]                                   In process                 Pink Top Hold[[id number]]                                   In process                   Please view results for these tests on the individual orders.   LIGHT BLUE TOP HOLD   GOLD TOP HOLD   PINK TOP HOLD   POCT URINE PREGNANCY        ECG Results              EKG 12-lead (Preliminary result)  Result time 01/08/24 02:27:01      Wet Read by Juan Carlos Zambrano MD (01/08/24 02:27:01, [facility], Emergency Medicine)    My Independent EKG Interpretation  01/08/2024 2:19 AM  Rate: 63 bpm  Rhythm: Sinus  Axis: Normal  Intervals: Normal  ST Changes: None  Impression: Normal sinus rhythm, normal EKG                                         EKG 12-lead (Preliminary result)  Result time 01/08/24 02:08:42      Wet Read by Juan Carlos Zambrano MD (01/08/24 02:08:42, [facility], Emergency Medicine)    My Independent EKG Interpretation  01/08/2024 12:41 AM  Rate: 79 bpm  Rhythm: Sinus  Axis: Normal  Intervals: Normal  ST Changes: None  Impression: Normal sinus rhythm, normal EKG.                                        Imaging Results    None          Medications   lactated ringers bolus 1,000 mL (0 mLs Intravenous Stopped 1/8/24 0213)   aluminum-magnesium hydroxide-simethicone 200-200-20 mg/5 mL suspension 30 mL (30 mLs Oral Given 1/8/24 0117)   dicyclomine capsule 20 mg (20 mg Oral Given 1/8/24 0222)     Medical Decision Making  Patient was a 28-year-old female presents emergency room complaints of epigastric abdominal pain along with chest pain.  Will obtain EKGs along with laboratory evaluation including CBC CMP and lipase.     Differential diagnosis:  Atypical chest pain, pancreatitis, gastritis, nonspecific abdominal pain, electrolyte abnormality, dehydration    Amount and/or Complexity of Data Reviewed  Labs: ordered.    Risk  OTC  "drugs.  Prescription drug management.               ED Course as of 01/08/24 0228 Mon Jan 08, 2024 0225 Discussed results with the patient.  No acute lab abnormalities appreciated.  Discussed with the patient regarding epigastric abdominal pain, likely related to gastritis.  Patient requesting 1 more EKG "just to make sure" that she was not having a heart attack.  EKG reviewed-reassurance given to the patient.  Patient will follow up with primary care physician. [MW]      ED Course User Index  [MW] Juan Carlos Zambrano MD                           Clinical Impression:  Final diagnoses:  [R07.9] Chest pain  [R10.13] Epigastric abdominal pain  [K29.70] Gastritis, presence of bleeding unspecified, unspecified chronicity, unspecified gastritis type (Primary)  [R07.89] Atypical chest pain          ED Disposition Condition    Discharge Stable          ED Prescriptions       Medication Sig Dispense Start Date End Date Auth. Provider    pantoprazole (PROTONIX) 40 MG tablet Take 1 tablet (40 mg total) by mouth once daily. 42 tablet 1/8/2024 2/19/2024 Juan Carlos Zambrano MD    famotidine (PEPCID) 20 MG tablet Take 1 tablet (20 mg total) by mouth 2 (two) times daily. for 14 days 28 tablet 1/8/2024 1/22/2024 Juan Carlos Zambrano MD          Follow-up Information       Follow up With Specialties Details Why Contact Info    Courtney Dhaliwal MD Internal Medicine   [address] [facility] [address]  [phone]      [facility] Emergency Medicine  As needed, If symptoms worsen [address] [facility] [address] [phone]             Juan Carlos Zambrano MD  01/08/24 0228    "

## 2024-01-16 ENCOUNTER — TELEPHONE (OUTPATIENT)
Dept: GASTROENTEROLOGY | Facility: CLINIC | Age: 29
End: 2024-01-16
Payer: MEDICAID

## 2024-01-16 NOTE — TELEPHONE ENCOUNTER
Spoke with patient patient stated that she will take the march 21 st appointment but would like to be on the waiting list for an earlier appointment

## 2024-01-16 NOTE — TELEPHONE ENCOUNTER
----- Message from Margarita Poon MA sent at 1/16/2024 11:04 AM CST -----  Regarding: FW: Change appt  Contact: Royce    ----- Message -----  From: Malorie Dickerson  Sent: 1/16/2024   9:44 AM CST  To: [facility] Staff  Subject: Change appt                                        Caller: Royce    Contact: [phone] (home)       Pt is calling to change appt. On 01/17/2024 to another date because of the  weather. Please advise. Requesting a call back.

## 2024-02-22 PROBLEM — R87.613 HSIL (HIGH GRADE SQUAMOUS INTRAEPITHELIAL LESION) ON PAP SMEAR OF CERVIX: Status: ACTIVE | Noted: 2024-02-22

## 2024-02-22 PROBLEM — N64.4 BREAST PAIN: Status: ACTIVE | Noted: 2024-02-22

## 2024-02-28 ENCOUNTER — LAB VISIT (OUTPATIENT)
Dept: LAB | Facility: HOSPITAL | Age: 29
End: 2024-02-28
Attending: NURSE PRACTITIONER
Payer: MEDICAID

## 2024-02-28 DIAGNOSIS — E05.00 GRAVES DISEASE: Primary | ICD-10-CM

## 2024-02-28 DIAGNOSIS — E05.00 GRAVES DISEASE: ICD-10-CM

## 2024-02-28 LAB
T3FREE SERPL-MCNC: 3.03 PG/ML (ref 1.58–3.91)
T4 FREE SERPL-MCNC: 0.79 NG/DL (ref 0.7–1.48)

## 2024-02-28 PROCEDURE — 83520 IMMUNOASSAY QUANT NOS NONAB: CPT

## 2024-02-28 PROCEDURE — 86376 MICROSOMAL ANTIBODY EACH: CPT

## 2024-02-28 PROCEDURE — 84481 FREE ASSAY (FT-3): CPT

## 2024-02-28 PROCEDURE — 84445 ASSAY OF TSI GLOBULIN: CPT

## 2024-02-28 PROCEDURE — 36415 COLL VENOUS BLD VENIPUNCTURE: CPT

## 2024-02-28 PROCEDURE — 84439 ASSAY OF FREE THYROXINE: CPT

## 2024-02-29 LAB
THYROID PEROXIDASE QUANT (OLG): >1542 IU/ML
TSH RECEP AB SER-ACNC: 3.4 IU/L (ref 0–1.75)

## 2024-03-04 LAB — TSI SER-ACNC: 1.1 TSI INDEX

## 2024-03-05 ENCOUNTER — OFFICE VISIT (OUTPATIENT)
Dept: ENDOCRINOLOGY | Facility: CLINIC | Age: 29
End: 2024-03-05
Payer: MEDICAID

## 2024-03-05 DIAGNOSIS — E05.00 GRAVES DISEASE: Primary | ICD-10-CM

## 2024-03-05 PROCEDURE — 99214 OFFICE O/P EST MOD 30 MIN: CPT | Mod: 95,,, | Performed by: NURSE PRACTITIONER

## 2024-03-05 PROCEDURE — 1160F RVW MEDS BY RX/DR IN RCRD: CPT | Mod: CPTII,95,, | Performed by: NURSE PRACTITIONER

## 2024-03-05 PROCEDURE — 1159F MED LIST DOCD IN RCRD: CPT | Mod: CPTII,95,, | Performed by: NURSE PRACTITIONER

## 2024-03-05 RX ORDER — KETOROLAC TROMETHAMINE 10 MG/1
10 TABLET, FILM COATED ORAL
COMMUNITY
Start: 2024-02-15 | End: 2024-04-11

## 2024-03-05 RX ORDER — ARIPIPRAZOLE 2 MG/1
TABLET ORAL
COMMUNITY
Start: 2023-06-15 | End: 2024-04-11

## 2024-03-05 RX ORDER — OMEPRAZOLE 40 MG/1
CAPSULE, DELAYED RELEASE ORAL
COMMUNITY
End: 2024-04-11

## 2024-03-05 RX ORDER — MUPIROCIN 20 MG/G
OINTMENT TOPICAL 3 TIMES DAILY
COMMUNITY
Start: 2024-02-16 | End: 2024-04-11

## 2024-03-05 RX ORDER — METHIMAZOLE 5 MG/1
TABLET ORAL
Qty: 15 TABLET | Refills: 3 | Status: SHIPPED | OUTPATIENT
Start: 2024-03-05 | End: 2024-04-11

## 2024-03-05 NOTE — PROGRESS NOTES
Subjective     Patient ID: Royce Quigley is a 28 y.o. female.    Chief Complaint: Hyperthyroidism    Previous Endocrine Clinic Note:      10/15/2020 Telemedicine Visit Note- Endocrine Clinic: 26 y/o female schedule for endocrine clinic F/U for Graves disease. Pt is 7 weeks post-partum and currently on MMI 5mg daily. TSH 0.043 Free T4 1.20 T4 9.1.  Patient reports a couple of weeks after pregnancy and she felt like she was hyperthyroid and had an old prescription of MMI at home and started on 5 mg, then she states 3 weeks ago she went to her primary care provider and was prescribed a new prescription of MMI 5 mg.  She states she has been on the new prescription that is not  for approximately 3 weeks.  She reports that her symptoms of palpitations, tremors and anxiety have slightly improved but she still continues with palpitations, weight loss.     2021 Endocrine Clinic Note: 26 year old female scheduled today's endocrine clinic follow-up for Graves' disease.  Patient was previously in remission with pregnancy and delivered 2020.  4 to 5 weeks after pregnancy patient felt like she was returning to hyperthyroidism and had restarted her MMI. Last documentation 2020 patient's TSH was 10.521 MMI 10 mg twice a day was reduced to once a day. She is currently MMI 5mg (1/2 of 10mg tablet), She reports she was seeing a health care provider in [place] who changed her medication. Pt states she feel speedy now, with palpitations, anxiety. Pt weight has increased. Will check labs today. (1)     2021 Endocrine Clinic Note: 26 year old female scheduled today for Endocrine follow-up.  History of Graves' disease/palpitations.  Patient has continued her MMI 5 mg and states when she ran out she had an old prescription that was 2-3 years old that she restarted she states she is severely worried about going back into hyperthyroidism.  Current labs TSH 0.855, free T4 1.0015 mg of MMI.  Discussed  with patient holding MMI to assess for remission patient states that she has a great fear from returning to hyperthyroidism and she is currently symptomatic with weight loss and palpitations and does not want to stop her MMI.  Instructed patient we will decrease to a half of a tablet and recheck labs in 3 weeks and to reconsider holding MMI.  Tobacco use patient smokes half a pack per day encourage cessation. (1)     3/15/2022 endocrine clinic note: 27-year-old female scheduled today for endocrine clinic follow-up. History of Graves' disease/palpitations. Graves' disease patient is currently in MMI 5 mg most recent TSH On 01/25/2022 TSH 2.2292.  Patient reports continued palpitations, excessive sweating and reports weight loss.  Patient also has a history of anxiety.  Patient is currently undergoing a cardiac work-up for palpitations and also was referred to gastro for an upper scope but has not completed yet. Enlarged thyroid multinodular goiter patient had ultrasound 6/28/2019 IMPRESSION: Heterogenicity in hypervascularity of the thyroid gland might be related to thyroid disease. Subcentimeter nodules as above do not meet criteria for biopsy.      Endocrine Clinic Note 07/19/2022: 27-year-old female scheduled today for endocrine clinic follow-up. History of Graves disease, multinodular goiter, enlarged thyroid, palpitations. Previous TSH 2.2292 on 01/25/2022 patient was previously held off MI and reported symptoms of palpitation, increased anxiety and tremors. Currently she is on MMI 5 mg. Palpitations patient reports occasional palpitations she states when she went to her PCP 1 week ago her  today patient's heart rate is 92 patient states compliance with her propanolol. Multinodular goiter/enlarged thyroid repeat ultrasound 03/23/2022 Mild diffuse heterogeneity and hypervascularity at the thyroid gland suggesting diffuse thyroiditis with several small benign sub centimeter nodules as detailed above. No  significant interval change compared to the thyroid ultrasound in 2019 aside from interval decrease in size of the left thyroid nodule.  Patient denies any new palpable nodules but does report mild dysphagia at times. Tobacco use patient is smoking 1 pack per day states she does not want states patient's program at this time she states she is not want to quit due to her anxiety.     Endocrine Clinic Note 01/19/2023:  27-year-old female scheduled today for endocrine clinic follow-up.  History of Graves disease, multinodular goiter and enlarged thyroid, palpitations. TSH 2.0166, Free T3 2.98, Free T4 0.91 on 07/19/2022 patient states when MMI stops she continue with palpitations and extreme anxiety increased symptoms of hyperthyroidism.  Patient remained on MMI 5 mg reports to clinic today and reports occasional palpitations and anxiety.  Patient's low blood pressure today for took a Xanax just prior to her visit along with propranolol 10 mg last night.  Patient currently being followed by cardiologist.  Multinodular goiter repeat ultrasound 03/23/2022 IMPRESSION: Mild diffuse heterogeneity and hypervascularity at the thyroid gland suggesting diffuse thyroiditis with several small benign subcentimeter nodules as detailed above. No significant interval change compared to the thyroid ultrasound in 2019 aside from interval decrease in size of the left thyroid nodule.  Patient is concerned developing type 1 diabetes for grandmother has a history of type 1 diabetes and discussing today like she is pancreatic insufficiency.  Patient's recent glucose of 139 previous glucoses were within normal limits.      Endocrine clinic note 07/24/2023:  28-year-old female day endocrine clinic follow-up.  History of Graves disease, multinodular goiter enlarged thyroid, palpitations.  TSH 2.769, Free T4 1.00, Free T3 3.05 on 01/19/2023.  Patient is currently on MMI 5 mg Patient states when dose is lowered she has symptoms of palpitations  symptoms of hyperthyroidism.  Multinodular goiter ultrasound 03/23/2023 IMPRESSION: Mild diffuse heterogeneity and hypervascularity at the thyroid gland suggesting diffuse thyroiditis with several small benign subcentimeter nodules as detailed above. No significant interval change compared to the thyroid ultrasound in 2019 aside from interval decrease in size of the left thyroid nodule.  Patient denies any palpable nodules but she does note palpable lymph node close to her right ear that has been palpable she states for over a year nontender.        Telemedicine Notes:  Endocrine clinic note 11/28/2023:  28-year-old female scheduled today for endocrine clinic follow-up.  History of Graves disease, multinodular goiter with enlarged thyroid and palpitations. Graves disease pt is on MMI 2.5 mg previous TSH 4.623, free T3 2.87, Free T4 0.79 on 07/24/2023 MMI reduced to half a tablet at that time.  Attempted to DC methimazole of increased anxiety and palpitations once off methimazole.  Pt takes Xanax for her anxiety.  Patient reports enlarged thyroid which is comfortable she states she is considering a thyroidectomy but wants to wait until her stomach issues resolved.  Discussed with the patient if she desires thyroidectomy we will hold MMI to see if she goes hyperthyroid before thyroidectomy can be done.        The patient location is:  Home  The chief complaint leading to consultation is:  Graves disease    Visit type: audiovisual    Face to Face time with patient: 15  30 minutes of total time spent on the encounter, which includes face to face time and non-face to face time preparing to see the patient (eg, review of tests), Obtaining and/or reviewing separately obtained history, Documenting clinical information in the electronic or other health record, Independently interpreting results (not separately reported) and communicating results to the patient/family/caregiver, or Care coordination (not separately reported).          Each patient to whom he or she provides medical services by telemedicine is:  (1) informed of the relationship between the physician and patient and the respective role of any other health care provider with respect to management of the patient; and (2) notified that he or she may decline to receive medical services by telemedicine and may withdraw from such care at any time.    Notes:  Endocrine clinic note 03/05/2024:  28 year female scheduled today for endocrine clinic follow-up.  History of Graves disease and autoimmune thyroiditis palpitations and history of anxiety.  Patient was on methimazole 2.5 mg Free T4 0.79 Free T3 3.03 on 02/28/2024, TPO >1.543, TSI 1.1, Trab 3.40 on 02/28/2024.  Patient reports she continues with anxiety whether related thyroid has a history of anxiety.  Patient does not report any weight changes.  She does report fatigue.          Narrative & Impression  Thyroid ultrasound     INDICATION: 27-year-old woman with enlarged thyroid gland/nodules.     TECHNIQUE: Real-time grayscale and color Doppler sonographic  evaluation of the thyroid gland was performed per routine protocol.     COMPARISON: Thyroid ultrasound 6/28/2019.     FINDINGS:     There is mild enlargement at the right thyroid lobe with the gland  otherwise normal and size and contour. The right thyroid lobe measures  5.4 x 1.6 x 1.9 cm and the left lobe 4.5 x 1.6 x 1.4 cm. The isthmus  measures just under 0.2 cm in AP thickness. A small colloid cyst at  the right thyroid lobe measures 5 x 3 x 4 mm and a second small and  well marginated solid hypoechoic nodule at the right posterior thyroid  lobe 5 x 3 x 5 mm. A single small colloid cyst in the left thyroid  lobe measures 3 x 2 x 3 mm. There is mild diffuse heterogeneity of the  thyroid gland parenchyma along with diffuse hypervascularity at the  gland.     IMPRESSION:     Mild diffuse heterogeneity and hypervascularity at the thyroid gland  suggesting diffuse  thyroiditis with several small benign subcentimeter  nodules as detailed above. No significant interval change compared to  the thyroid ultrasound in 2019 aside from interval decrease in size of  the left thyroid nodule.  Electronically Signed By: Erasmo Pa MD  Date/Time Signed: 03/23/2022 16:27     Specimen Collected: 03/23/22 14:13 Last Resulted: 03/23/22 14:13                       Review of Systems       Objective     Physical Exam  Constitutional:       Appearance: Normal appearance.   HENT:      Head: Normocephalic.      Nose: Nose normal.   Eyes:      Conjunctiva/sclera: Conjunctivae normal.   Pulmonary:      Effort: Pulmonary effort is normal.   Musculoskeletal:      Cervical back: Normal range of motion.   Neurological:      Mental Status: She is alert.   Psychiatric:         Mood and Affect: Mood normal.         Behavior: Behavior normal.         Thought Content: Thought content normal.         Judgment: Judgment normal.            Assessment and Plan     1. Graves disease  On MMI 2.5 mg   Free T4 0.79 Free T3 3.03 on 02/28/2024  TPO >1.543, TSI 1.1, Trab 3.40 on 02/28/2024   Component Ref Range & Units 6 d ago  (2/28/24) 7 mo ago  (7/24/23) 1 yr ago  (1/19/23) 1 yr ago  (7/19/22) 2 yr ago  (12/19/21) 2 yr ago  (9/30/21) 2 yr ago  (9/16/21)   Thyroxine Free 0.70 - 1.48 ng/dL 0.79 0.79 1.00 0.91 0.91 1.00 0.97                Component Ref Range & Units 6 d ago  (2/28/24) 7 mo ago  (7/24/23) 1 yr ago  (1/19/23) 1 yr ago  (7/19/22) 2 yr ago  (3/22/21) 3 yr ago  (3/23/20) 4 yr ago  (2/20/20)   T3 Free 1.58 - 3.91 pg/mL 3.03 2.87 3.05 R 2.98 R 3.02 R 2.61 R 2.90 R           Component Ref Range & Units 6 d ago 7 mo ago   Thyrotropin Receptor Ab 0.00 - 1.75 IU/L 3.40 High  3.25 High  CM              Component Ref Range & Units 6 d ago 7 mo ago   Thyroid-Stimulating Immunoglob <=1.3 TSI index 1.1 1.2 CM           Component Ref Range & Units 6 d ago 7 mo ago   TPO Ab Quant <25 IU/mL >1,542 High  1,313 High   CM      -     methIMAzole (TAPAZOLE) 5 MG Tab; Take a half a tablet per day  Dispense: 15 tablet; Refill: 3  -     TSH; Future; Expected date: 04/02/2024  -     T3, Free ([facility]); Future; Expected date: 04/02/2024  -     T4, Free; Future; Expected date: 04/02/2024          Follow up in about 3 months (around 6/5/2024) for Graves, Hashimotos, virtual visit.    I spent a total of 30 minutes on the day of the visit.  This includes face to face time and non-face to face time preparing to see the patient (eg, review of tests), obtaining and/or reviewing separately obtained history, documenting clinical information in the electronic or other health record, independently interpreting results and communicating results to the patient/family/caregiver, or care coordinator.

## 2024-03-21 ENCOUNTER — TELEPHONE (OUTPATIENT)
Dept: GASTROENTEROLOGY | Facility: CLINIC | Age: 29
End: 2024-03-21
Payer: MEDICAID

## 2024-03-21 ENCOUNTER — OFFICE VISIT (OUTPATIENT)
Dept: GASTROENTEROLOGY | Facility: CLINIC | Age: 29
End: 2024-03-21
Payer: MEDICAID

## 2024-03-21 VITALS
BODY MASS INDEX: 21.66 KG/M2 | DIASTOLIC BLOOD PRESSURE: 85 MMHG | HEIGHT: 60 IN | HEART RATE: 88 BPM | SYSTOLIC BLOOD PRESSURE: 122 MMHG | WEIGHT: 110.31 LBS | OXYGEN SATURATION: 100 %

## 2024-03-21 DIAGNOSIS — D50.9 IRON DEFICIENCY ANEMIA, UNSPECIFIED IRON DEFICIENCY ANEMIA TYPE: ICD-10-CM

## 2024-03-21 DIAGNOSIS — R10.9 ABDOMINAL PAIN, UNSPECIFIED ABDOMINAL LOCATION: Primary | ICD-10-CM

## 2024-03-21 PROCEDURE — 99205 OFFICE O/P NEW HI 60 MIN: CPT | Mod: S$PBB,,, | Performed by: INTERNAL MEDICINE

## 2024-03-21 PROCEDURE — 99999 PR PBB SHADOW E&M-EST. PATIENT-LVL IV: CPT | Mod: PBBFAC,,, | Performed by: INTERNAL MEDICINE

## 2024-03-21 PROCEDURE — 99214 OFFICE O/P EST MOD 30 MIN: CPT | Mod: PBBFAC,PN | Performed by: INTERNAL MEDICINE

## 2024-03-21 PROCEDURE — 3079F DIAST BP 80-89 MM HG: CPT | Mod: CPTII,,, | Performed by: INTERNAL MEDICINE

## 2024-03-21 PROCEDURE — 3008F BODY MASS INDEX DOCD: CPT | Mod: CPTII,,, | Performed by: INTERNAL MEDICINE

## 2024-03-21 PROCEDURE — 1159F MED LIST DOCD IN RCRD: CPT | Mod: CPTII,,, | Performed by: INTERNAL MEDICINE

## 2024-03-21 PROCEDURE — 3074F SYST BP LT 130 MM HG: CPT | Mod: CPTII,,, | Performed by: INTERNAL MEDICINE

## 2024-03-21 PROCEDURE — G2211 COMPLEX E/M VISIT ADD ON: HCPCS | Mod: S$PBB,,, | Performed by: INTERNAL MEDICINE

## 2024-03-21 RX ORDER — ALPRAZOLAM 1 MG/1
1 TABLET ORAL 2 TIMES DAILY PRN
COMMUNITY
Start: 2024-03-11

## 2024-03-21 RX ORDER — ONDANSETRON 4 MG/1
4 TABLET, FILM COATED ORAL EVERY 8 HOURS PRN
Qty: 40 TABLET | Refills: 3 | Status: SHIPPED | OUTPATIENT
Start: 2024-03-21

## 2024-03-21 RX ORDER — DICYCLOMINE HYDROCHLORIDE 20 MG/1
20 TABLET ORAL EVERY 6 HOURS
Qty: 120 TABLET | Refills: 5 | Status: SHIPPED | OUTPATIENT
Start: 2024-03-21

## 2024-03-21 NOTE — PROGRESS NOTES
"Subjective:       Patient ID: Royce Quigley is a 29 y.o. female.    Chief Complaint: Abdominal Pain and Weight Loss    Progressively worsening abdominal pain over past few years. At once was intermittent, now constant. L sided. Associated nausea.     + weight loss.    Anemia.     Scopes in media.     Denies ges.     Diarrhea oily stools at a time. Elastase was ordered however never turned in.     Bentyl does help pain somewhat. Has taken elavil in the past for migraines. Was prescribed but didn't take it because on other antidepressants and was concerned for seritonin syndrom.     MRCP "narrow duct". FHx     Past Medical History:   Diagnosis Date    Anxiety disorder, unspecified     Depression     Essential fatty acid deficiency     Gastric motility disorder     Graves disease     History of ovarian cyst     frequent, reoccurring with hx of rupture and hemorrhage    Iron deficiency anemia, unspecified     Pelvic congestion syndrome     Poor dentition     POTS (postural orthostatic tachycardia syndrome)     Smoker     Vitamin D deficiency        Past Surgical History:   Procedure Laterality Date    CHOLECYSTECTOMY      DX-LAP, EVACUATION HEMOPERITONEUM  11/30/2021    GALLBLADDER SURGERY      LAPAROSCOPIC CHOLECYSTECTOMY N/A 10/10/2022    Procedure: CHOLECYSTECTOMY, LAPAROSCOPIC;  Surgeon: Tobin Aguiar MD;  Location: [facility];  Service: General;  Laterality: N/A;    TONSILLECTOMY  11/2011       Social History  Social History     Tobacco Use    Smoking status: Every Day     Current packs/day: 1.50     Average packs/day: 1.3 packs/day for 30.2 years (38.3 ttl pk-yrs)     Types: Cigarettes     Start date: 1/1/2008     Passive exposure: Never    Smokeless tobacco: Never   Substance Use Topics    Alcohol use: Never    Drug use: Never       Family History   Problem Relation Age of Onset    Heart disease Paternal Grandfather     Cancer Paternal Grandfather     Stroke Paternal Grandfather     Heart disease Paternal " Grandmother     Pancreatic cancer Maternal Grandmother     Ovarian cancer Maternal Grandmother     Cancer Maternal Grandmother     Heart disease Father     Heart attack Father     Coronary artery disease Father     Cancer Mother         colorectal cancer    Bipolar disorder Mother     Irritable bowel syndrome Mother     No Known Problems Brother     Kidney disease Sister     No Known Problems Son     Stroke Paternal Aunt     Stroke Maternal Aunt        Review of Systems   Gastrointestinal:  Positive for abdominal distention, abdominal pain and diarrhea.           Objective:      Physical Exam  Constitutional:       Appearance: She is well-developed.   HENT:      Head: Normocephalic and atraumatic.   Eyes:      Conjunctiva/sclera: Conjunctivae normal.   Pulmonary:      Effort: Pulmonary effort is normal. No respiratory distress.   Musculoskeletal:      Cervical back: Normal range of motion.   Neurological:      Mental Status: She is alert and oriented to person, place, and time.   Psychiatric:         Behavior: Behavior normal.         Thought Content: Thought content normal.         Judgment: Judgment normal.           Pertinent labs and imaging studies reviewed    Assessment:       1. Abdominal pain, unspecified abdominal location    2. Iron deficiency anemia, unspecified iron deficiency anemia type        Plan:       Extensive w/u, reviewed  Check GES  Send for VCE to ex  Possibly functional. Could address adding TCA with psych    Visit today included increased complexity associated with the care of the episodic problem abdominal pain, CARLYLE addressed and managing the longitudinal care of the patient due to the serious and/or complex managed problem(s) .    107 minutes of total time spent on the encounter, which includes face to face time and non-face to face time preparing to see the patient (eg, review of tests), Obtaining and/or reviewing separately obtained history, Documenting clinical information in the  electronic or other health record, Independently interpreting results (not separately reported) and communicating results to the patient/family/caregiver, or Care coordination (not separately reported).         (Portions of this note were dictated using voice recognition software and may contain dictation related errors in spelling/grammar/syntax not found on text review)

## 2024-03-28 ENCOUNTER — HOSPITAL ENCOUNTER (OUTPATIENT)
Dept: RADIOLOGY | Facility: HOSPITAL | Age: 29
Discharge: HOME | End: 2024-03-28
Attending: INTERNAL MEDICINE
Payer: MEDICAID

## 2024-03-28 DIAGNOSIS — R10.9 ABDOMINAL PAIN, UNSPECIFIED ABDOMINAL LOCATION: ICD-10-CM

## 2024-03-28 PROCEDURE — A9541 TC99M SULFUR COLLOID: HCPCS | Performed by: INTERNAL MEDICINE

## 2024-03-28 PROCEDURE — 78264 GASTRIC EMPTYING IMG STUDY: CPT | Mod: TC

## 2024-03-28 RX ORDER — TECHNETIUM TC 99M SULFUR COLLOID 2 MG
2 KIT MISCELLANEOUS
Status: COMPLETED | OUTPATIENT
Start: 2024-03-28 | End: 2024-03-28

## 2024-03-28 RX ADMIN — TECHNETIUM TC 99M SULFUR COLLOID 1.9 MILLICURIE: KIT at 08:03

## 2024-04-08 ENCOUNTER — TELEPHONE (OUTPATIENT)
Dept: ENDOSCOPY | Facility: HOSPITAL | Age: 29
End: 2024-04-08
Payer: MEDICAID

## 2024-04-08 NOTE — TELEPHONE ENCOUNTER
Left message instructing patient to call dept @ [phone] between 8am-4pm.    Arrival time to be given @ *800  (Message sent via [facility] portal)      Spoke to pt, pt unable to get instructions. Request call back

## 2024-04-09 ENCOUNTER — NURSE TRIAGE (OUTPATIENT)
Dept: ADMINISTRATIVE | Facility: CLINIC | Age: 29
End: 2024-04-09
Payer: MEDICAID

## 2024-04-09 ENCOUNTER — TELEPHONE (OUTPATIENT)
Dept: ENDOSCOPY | Facility: HOSPITAL | Age: 29
End: 2024-04-09
Payer: MEDICAID

## 2024-04-09 ENCOUNTER — PATIENT MESSAGE (OUTPATIENT)
Dept: GASTROENTEROLOGY | Facility: CLINIC | Age: 29
End: 2024-04-09
Payer: MEDICAID

## 2024-04-09 ENCOUNTER — HOSPITAL ENCOUNTER (OUTPATIENT)
Dept: RADIOLOGY | Facility: HOSPITAL | Age: 29
Discharge: HOME | End: 2024-04-09
Payer: MEDICAID

## 2024-04-09 DIAGNOSIS — G89.29 ABDOMINAL PAIN, CHRONIC, RIGHT UPPER QUADRANT: ICD-10-CM

## 2024-04-09 DIAGNOSIS — R10.11 ABDOMINAL PAIN, CHRONIC, RIGHT UPPER QUADRANT: ICD-10-CM

## 2024-04-09 PROCEDURE — 93976 VASCULAR STUDY: CPT | Mod: TC

## 2024-04-09 PROCEDURE — 76775 US EXAM ABDO BACK WALL LIM: CPT | Mod: TC

## 2024-04-09 NOTE — TELEPHONE ENCOUNTER
Pt calling to reschedule procedure tomorrow due to the bad weather coming. Reports she lives two hours away and her vehicle is not reliable. She would like follow up to reschedule.   Reason for Disposition   Nursing judgment or information in reference   Health Information question, no triage required and triager able to answer question    Protocols used: No Guideline [id number], Information Only Call - No Triage-A-

## 2024-04-10 ENCOUNTER — TELEPHONE (OUTPATIENT)
Dept: GASTROENTEROLOGY | Facility: CLINIC | Age: 29
End: 2024-04-10
Payer: MEDICAID

## 2024-04-10 NOTE — TELEPHONE ENCOUNTER
Spoke with pt and cancelled procedure today. Pt stated she will give the office a call back when she is ready to schedule. Verbal understanding

## 2024-04-10 NOTE — TELEPHONE ENCOUNTER
Spoke with pt and cancelled procedure today. Pt will give the office a call when she is ready to reschedule. Verbal understanding

## 2024-04-11 ENCOUNTER — OFFICE VISIT (OUTPATIENT)
Dept: INTERNAL MEDICINE | Facility: CLINIC | Age: 29
End: 2024-04-11
Payer: MEDICAID

## 2024-04-11 VITALS
HEART RATE: 96 BPM | TEMPERATURE: 98 F | SYSTOLIC BLOOD PRESSURE: 99 MMHG | RESPIRATION RATE: 20 BRPM | HEIGHT: 60 IN | WEIGHT: 111.38 LBS | OXYGEN SATURATION: 100 % | BODY MASS INDEX: 21.87 KG/M2 | DIASTOLIC BLOOD PRESSURE: 65 MMHG

## 2024-04-11 DIAGNOSIS — E05.00 GRAVES DISEASE: ICD-10-CM

## 2024-04-11 DIAGNOSIS — R10.11 ABDOMINAL PAIN, CHRONIC, RIGHT UPPER QUADRANT: Primary | ICD-10-CM

## 2024-04-11 DIAGNOSIS — G89.29 ABDOMINAL PAIN, CHRONIC, RIGHT UPPER QUADRANT: Primary | ICD-10-CM

## 2024-04-11 PROCEDURE — 99214 OFFICE O/P EST MOD 30 MIN: CPT | Mod: PBBFAC

## 2024-04-11 NOTE — PROGRESS NOTES
Gi  [facility] INTERNAL MEDICINE  OUTPATIENT OFFICE VISIT NOTE    SUBJECTIVE:      Chief Complaint: Medication Refill (Patient states no acute complaints today.)       HPI: Royce Quigley is a 29 y.o. yo female w/ PMH of Graves disease, chronic abdominal pain, and depression/anxiety who presents for follow-up.     10/2023: Per patient she has been seen by several specialists in the past including GI, cardiology, and vascular surgery with no resolution in symptoms.  She reports chronic abdominal pain over the last 3 years mostly located in her left upper quadrant and epigastric region associated with swelling and episodes of severe pain.  She has had multiple studies over the past year including CT abdomen/pelvis, CTA abdomen/pelvis, and abdominal US with no reported change.  She has a history of an exploratory laparotomy where she reportedly had a hemorrhage of an ovarian cyst requiring evacuation of a large amount of intraperitoneal fluid.   She also reports being diagnosed with POTS and nutcracker syndrome in the past but denies any treatment.  She also states she has a history of endocarditis for which she previously completed antibiotics for and was following with cardiology outpatient. She is also endorsing a 10 pound weight loss over the last 3 years with an inability to gain weight.  She reports abnormal bowel movements that occur daily.  She reports current abdominal pain 6/10.  She received EGD from Dr. Galindo earlier in the year with results showing esophagitis but no other abnormalities noted on report. She has been on several medications for abdominal pain including PPI's, pepcid, zofran, sucralfate with no resolution in symptoms.  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  She follows with psychiatrist for related issues and is currently on xanax PRN as well as duloxetine.      4/11/2024:  Patient here for follow-up.   Continues to endorse LUQ abdominal pain.  Similar complaints as discussed at prior visit as above.  Is following with new GI physician, Dr. Foley.  Recently had a gastric emptying study done that was inconclusive.  Per patient continuing workup with capsule endoscopy that she is working on rescheduling. Otherwise, patient reporting no new acute complaints.  States she feels she may experience constipation at times as well as bloating and loose stools but mostly reports abdominal pain and swelling in LUQ.  Continues to follow with psychiatrist and counselor for related issues.  Endorses compliance with medications.     Past Medical History:   has a past medical history of Anxiety disorder, unspecified, Depression, Essential fatty acid deficiency, Gastric motility disorder, Graves disease, History of ovarian cyst, Iron deficiency anemia, unspecified, Pelvic congestion syndrome, Poor dentition, POTS (postural orthostatic tachycardia syndrome), Smoker, and Vitamin D deficiency.     Past Surgical History:   has a past surgical history that includes Tonsillectomy (11/2011); DX-LAP, EVACUATION HEMOPERITONEUM (11/30/2021); Laparoscopic cholecystectomy (N/A, 10/10/2022); Gallbladder surgery; and Cholecystectomy.     Family History:  family history includes Bipolar disorder in her mother; Cancer in her maternal grandmother, mother, and paternal grandfather; Coronary artery disease in her father; Heart attack in her father; Heart disease in her father, paternal grandfather, and paternal grandmother; Irritable bowel syndrome in her mother; Kidney disease in her sister; No Known Problems in her brother and son; Ovarian cancer in her maternal grandmother; Pancreatic cancer in her maternal grandmother; Stroke in her maternal aunt, paternal aunt, and paternal grandfather.     Social History:   reports that she has been smoking cigarettes. She started smoking about 16 years ago. She has a 38.4 pack-year smoking history. She has  never been exposed to tobacco smoke. She has never used smokeless tobacco. She reports that she does not drink alcohol and does not use drugs.     Allergies:  is allergic to benadryl [diphenhydramine hcl] and diphenhydramine.     Home Medications:  Prior to Admission medications    Medication Sig Start Date End Date Taking? Authorizing Provider   ALPRAZolam (XANAX) 1 MG tablet Take 1 mg by mouth daily as needed. 5/9/22   Provider, Historical   bisacodyL (DULCOLAX) 5 mg EC tablet Take 1 tablet (5 mg total) by mouth daily as needed for Constipation. 8/11/23   Erasmo Kilpatrick, DO   chlorhexidine (PERIDEX) 0.12 % solution 5 mLs 3 (three) times daily. 4/5/22   Provider, Historical   clindamycin (CLEOCIN) 150 MG capsule Take 150 mg by mouth every 6 (six) hours. Times 10 days    Provider, Historical   dicyclomine (BENTYL) 20 mg tablet  7/17/22   Provider, Historical   DULoxetine (CYMBALTA) 60 MG capsule Take 60 mg by mouth once daily. 8/5/19   Provider, Historical   famotidine (PEPCID) 20 MG tablet Take 1 tablet (20 mg total) by mouth 2 (two) times daily. for 10 days 8/11/23 8/21/23  Erasmo Kilpatrick, DO   FEROSUL 325 mg (65 mg iron) Tab tablet Take by mouth. 11/23/22   Provider, Historical   fluticasone propionate (FLONASE) 50 mcg/actuation nasal spray 2 sprays by Each Nostril route. 8/1/22   Provider, Historical   gabapentin (NEURONTIN) 300 MG capsule Take 1 capsule (300 mg total) by mouth 3 (three) times daily. for 10 days 10/11/22 10/21/22  Uriel Carver MD   methIMAzole (TAPAZOLE) 5 MG Tab Take 1 tablet (5 mg total) by mouth once daily. 9/4/23   Meredith Sales, NP   omeprazole (PRILOSEC) 40 MG capsule Take 40 mg by mouth. 1/9/23   Provider, Historical   ondansetron (ZOFRAN-ODT) 4 MG TbDL Take 4 mg by mouth every 6 (six) hours as needed. 11/30/21   Provider, Historical   ondansetron (ZOFRAN-ODT) 4 MG TbDL Take 1 tablet (4 mg total) by mouth every 6 (six) hours as needed (nausea). 8/11/23   Finesse,  Erasmo BROWN DO   propranolol (INDERAL) 10 MG tablet Take 10 mg by mouth 2 (two) times daily. 8/5/19   Provider, Historical   SUTAB 1.479-0.188- 0.225 gram tablet  8/30/22   Provider, Historical     Review of Systems   Constitutional:  Positive for malaise/fatigue and weight loss.   Respiratory:  Negative for shortness of breath.    Cardiovascular:  Positive for chest pain and palpitations.   Gastrointestinal:  Positive for abdominal pain, constipation and diarrhea. Negative for nausea and vomiting.   Genitourinary:  Negative for dysuria.   Musculoskeletal:  Positive for back pain and myalgias.   Psychiatric/Behavioral:  Positive for depression. The patient is nervous/anxious.          OBJECTIVE:     Vital signs:   BP 99/65 (BP Location: Right arm, Patient Position: Sitting, BP Method: Medium (Automatic))   Pulse 96   Temp 98.3 °F (36.8 °C) (Oral)   Resp 20   Ht 5' (1.524 m)   Wt 50.5 kg (111 lb 6.4 oz)   LMP 03/28/2024 (Approximate)   SpO2 100%   BMI 21.76 kg/m²      Physical Examination:  General: Patient resting comfortably, in no acute distress   Eye: PERRLA, EOMI, clear conjunctiva, eyelids normal  HENT: Head-normocephalic and atraumatic  Neck: full range of motion, no thyromegaly or lymphadenopathy, trachea midline, supple, no palpable thyroid nodules  Respiratory: clear to auscultation bilaterally without wheezes, rales, rhonchi  Cardiovascular: regular rate and rhythm without murmurs.  No gallops or rubs no JVD.  Capillary refill within normal limits.  Gastrointestinal: soft, tender to palpation over epigastric region and left upper quadrant, non-distended with normal bowel sounds, without masses to palpation.  No hernia noted with valsalva   Genitourinary: no CVA tenderness to palpation  Musculoskeletal: full range of motion of all extremities/spine without limitation or discomfort  Integumentary: no rashes or skin lesions present  Neurologic: no signs of peripheral neurological deficit, motor/sensory  function intact  Psychiatric:  alert and oriented, cognitive function intact, cooperative with exam, good eye contact, judgement and insight intact, mood and affect full range.     Labs:  CMP:   Lab Results   Component Value Date    GLUCOSE 88 01/08/2024    CALCIUM 9.1 01/08/2024    ALBUMIN 3.9 01/08/2024     01/08/2024    K 3.8 01/08/2024    CO2 25 01/08/2024     03/02/2023    BUN 10.1 01/08/2024    CREATININE 0.74 01/08/2024    ALKPHOS 54 01/08/2024    ALT 12 01/08/2024    AST 12 01/08/2024    BILITOT 0.3 01/08/2024      CBC:   Lab Results   Component Value Date    WBC 7.34 01/08/2024    HGB 11.3 (L) 01/08/2024    HCT 36.0 (L) 01/08/2024    MCV 87.2 01/08/2024    RDW 14.6 01/08/2024     FLP:   Lab Results   Component Value Date    CHOL 128 12/03/2021    HDL 44 12/03/2021    LDL 72.00 12/03/2021    TRIG 58 12/03/2021    TOTALCHOLEST 3 12/03/2021     Coagulation:   Lab Results   Component Value Date    INR 0.96 04/04/2018     DM:   Lab Results   Component Value Date    HGBA1C 4.8 01/19/2023    EAG 91.1 01/19/2023    CREATININE 0.74 01/08/2024     Thyroid:   Lab Results   Component Value Date    TSH 1.926 08/18/2023    TWLAIH7QWMT 0.79 02/28/2024    S0DGMFQ 7.5 08/29/2019     LFTs:   Lab Results   Component Value Date    LABPROT 7.0 01/08/2024    ALBUMIN 3.9 01/08/2024    AST 12 01/08/2024    ALT 12 01/08/2024    ALKPHOS 54 01/08/2024     Anemia:   Lab Results   Component Value Date    GDAHLQNR62 529 02/05/2018    FOLATE 10.6 02/05/2018       ASSESSMENT & PLAN:     Graves disease  Multinodular goiter   Palpitations   -following with endocrinology, last seen 3/5/2024  -Soft tissue US 7/31/2023: There is heterogenicity in increased vascularity of the thyroid within the right hemithyroid there is a 6 mm x 3 mm x 6 mm and a 5 mm x 3 mm x 3 mm new nodule both of these nodules are subcentimeter and do not meet criteria for biopsy and or surveillance there appear to be stable as compared with the previous  exam.  -continue methimazole 2.5mg and propranolol 10mg per endocrine recs   -continue scheduled f/u   -TSH and T4 wnl     Anxiety/depression   -following with outside psychiatrist     Chronic abdominal pain   -Patient has had multiple imaging modalities performed over the last year with no significant explanation for her symptoms   -patient now following with Dr. Foley in [place] last seen 3/21/2024  -prior lab work performed unremarkable for abnormalities.    -has been evaluated by GYN, vascular surgery, cardiology, and GI in the past per patient with no further recommendations.    -this appears to be a complicated situation that has had extensive workup performed in the past. Unclear etiology at this time.  Will continue to monitor.   -gastric emptying study performed 3/28/2026 which was inconclusive.  Patient reports she has a capsule endoscopy scheduled.   -continue scheduled follow-up with GI     Attempted to discuss health maintenance at this visit. Will continue discussion at next visit.    Return to clinic in 3 months.     Courtney Dhaliwal MD  [facility] Internal Medicine, PGY-2

## 2024-05-02 ENCOUNTER — HOSPITAL ENCOUNTER (EMERGENCY)
Facility: HOSPITAL | Age: 29
Discharge: HOME | End: 2024-05-02
Attending: INTERNAL MEDICINE
Payer: MEDICAID

## 2024-05-02 VITALS
DIASTOLIC BLOOD PRESSURE: 64 MMHG | WEIGHT: 112.63 LBS | HEART RATE: 72 BPM | OXYGEN SATURATION: 99 % | BODY MASS INDEX: 22 KG/M2 | RESPIRATION RATE: 18 BRPM | TEMPERATURE: 98 F | SYSTOLIC BLOOD PRESSURE: 100 MMHG

## 2024-05-02 DIAGNOSIS — J34.89 SINUS PRESSURE: Primary | ICD-10-CM

## 2024-05-02 LAB
ALBUMIN SERPL-MCNC: 4.2 G/DL (ref 3.5–5)
ALBUMIN/GLOB SERPL: 1.2 RATIO (ref 1.1–2)
ALP SERPL-CCNC: 55 UNIT/L (ref 40–150)
ALT SERPL-CCNC: 11 UNIT/L (ref 0–55)
APPEARANCE UR: CLEAR
AST SERPL-CCNC: 12 UNIT/L (ref 5–34)
B-HCG UR QL: NEGATIVE
BACTERIA #/AREA URNS AUTO: ABNORMAL /HPF
BASOPHILS # BLD AUTO: 0.05 X10(3)/MCL
BASOPHILS NFR BLD AUTO: 1 %
BILIRUB SERPL-MCNC: 0.3 MG/DL
BILIRUB UR QL STRIP.AUTO: NEGATIVE
BUN SERPL-MCNC: 10.3 MG/DL (ref 7–18.7)
CALCIUM SERPL-MCNC: 9.6 MG/DL (ref 8.4–10.2)
CHLORIDE SERPL-SCNC: 105 MMOL/L (ref 98–107)
CO2 SERPL-SCNC: 26 MMOL/L (ref 22–29)
COLOR UR AUTO: COLORLESS
CREAT SERPL-MCNC: 0.76 MG/DL (ref 0.55–1.02)
CTP QC/QA: YES
EOSINOPHIL # BLD AUTO: 0.15 X10(3)/MCL (ref 0–0.9)
EOSINOPHIL NFR BLD AUTO: 3 %
ERYTHROCYTE [DISTWIDTH] IN BLOOD BY AUTOMATED COUNT: 14.3 % (ref 11.5–17)
FLUAV AG UPPER RESP QL IA.RAPID: NOT DETECTED
FLUBV AG UPPER RESP QL IA.RAPID: NOT DETECTED
GFR SERPLBLD CREATININE-BSD FMLA CKD-EPI: >60 MLS/MIN/1.73/M2
GLOBULIN SER-MCNC: 3.6 GM/DL (ref 2.4–3.5)
GLUCOSE SERPL-MCNC: 76 MG/DL (ref 74–100)
GLUCOSE UR QL STRIP.AUTO: NORMAL
HCT VFR BLD AUTO: 37.3 % (ref 37–47)
HGB BLD-MCNC: 12.3 G/DL (ref 12–16)
HOLD SPECIMEN: NORMAL
HYALINE CASTS #/AREA URNS LPF: ABNORMAL /LPF
IMM GRANULOCYTES # BLD AUTO: 0.01 X10(3)/MCL (ref 0–0.04)
IMM GRANULOCYTES NFR BLD AUTO: 0.2 %
KETONES UR QL STRIP.AUTO: NEGATIVE
LEUKOCYTE ESTERASE UR QL STRIP.AUTO: NEGATIVE
LYMPHOCYTES # BLD AUTO: 1.82 X10(3)/MCL (ref 0.6–4.6)
LYMPHOCYTES NFR BLD AUTO: 36.8 %
MCH RBC QN AUTO: 29 PG (ref 27–31)
MCHC RBC AUTO-ENTMCNC: 33 G/DL (ref 33–36)
MCV RBC AUTO: 88 FL (ref 80–94)
MONOCYTES # BLD AUTO: 0.36 X10(3)/MCL (ref 0.1–1.3)
MONOCYTES NFR BLD AUTO: 7.3 %
MUCOUS THREADS URNS QL MICRO: ABNORMAL /LPF
NEUTROPHILS # BLD AUTO: 2.55 X10(3)/MCL (ref 2.1–9.2)
NEUTROPHILS NFR BLD AUTO: 51.7 %
NITRITE UR QL STRIP.AUTO: NEGATIVE
NRBC BLD AUTO-RTO: 0 %
PH UR STRIP.AUTO: 7 [PH]
PLATELET # BLD AUTO: 229 X10(3)/MCL (ref 130–400)
PMV BLD AUTO: 11.7 FL (ref 7.4–10.4)
POTASSIUM SERPL-SCNC: 4.1 MMOL/L (ref 3.5–5.1)
PROT SERPL-MCNC: 7.8 GM/DL (ref 6.4–8.3)
PROT UR QL STRIP.AUTO: NEGATIVE
RBC # BLD AUTO: 4.24 X10(6)/MCL (ref 4.2–5.4)
RBC #/AREA URNS AUTO: ABNORMAL /HPF
RBC UR QL AUTO: NEGATIVE
RSV A 5' UTR RNA NPH QL NAA+PROBE: NOT DETECTED
SARS-COV-2 RNA RESP QL NAA+PROBE: NOT DETECTED
SODIUM SERPL-SCNC: 138 MMOL/L (ref 136–145)
SP GR UR STRIP.AUTO: 1.01 (ref 1–1.03)
SQUAMOUS #/AREA URNS LPF: ABNORMAL /HPF
UROBILINOGEN UR STRIP-ACNC: NORMAL
WBC # SPEC AUTO: 4.94 X10(3)/MCL (ref 4.5–11.5)
WBC #/AREA URNS AUTO: ABNORMAL /HPF

## 2024-05-02 PROCEDURE — 0241U COVID/RSV/FLU A&B PCR: CPT | Performed by: PHYSICIAN ASSISTANT

## 2024-05-02 PROCEDURE — 80053 COMPREHEN METABOLIC PANEL: CPT | Performed by: PHYSICIAN ASSISTANT

## 2024-05-02 PROCEDURE — 81025 URINE PREGNANCY TEST: CPT | Performed by: PHYSICIAN ASSISTANT

## 2024-05-02 PROCEDURE — 85025 COMPLETE CBC W/AUTO DIFF WBC: CPT | Performed by: PHYSICIAN ASSISTANT

## 2024-05-02 PROCEDURE — 81001 URINALYSIS AUTO W/SCOPE: CPT | Performed by: PHYSICIAN ASSISTANT

## 2024-05-02 PROCEDURE — 99283 EMERGENCY DEPT VISIT LOW MDM: CPT

## 2024-05-02 RX ORDER — IBUPROFEN 600 MG/1
600 TABLET ORAL EVERY 6 HOURS PRN
Qty: 20 TABLET | Refills: 0 | Status: SHIPPED | OUTPATIENT
Start: 2024-05-02 | End: 2024-06-11 | Stop reason: ALTCHOICE

## 2024-05-02 RX ORDER — FLUTICASONE PROPIONATE 50 MCG
1 SPRAY, SUSPENSION (ML) NASAL 2 TIMES DAILY PRN
Qty: 15 G | Refills: 0 | Status: SHIPPED | OUTPATIENT
Start: 2024-05-02

## 2024-05-02 NOTE — ED PROVIDER NOTES
Encounter Date: 5/2/2024       History     Chief Complaint   Patient presents with    Facial Pain     PT W CO HA, NOSE AND SINUS PAIN W INTERMITTENT NOSE BLEED AND BLURRED VISION TO LT EYE X 3 WKS.  ? INJURY TO NOSE > 3 MONTHS AGO.      Headache     Patient is 29-year-old female with PMH of Graves disease, chronic abdominal pain, and depression/anxiety who presents to the ED due to facial pain. Patient states that for last 3 weeks she has had on and off pressure/pain around her sinuses mainly on her left side.  Patient denies congestion, nasal drainage, ear pain, fever, chills, nausea, vomiting.  Patient states that she has been rotating ibuprofen 400 mg with Tylenol 1000 mg with minimal relief.  Patient has history of health anxiety.     The history is provided by the patient.     Review of patient's allergies indicates:   Allergen Reactions    Benadryl [diphenhydramine hcl] Anxiety     Hallucinations     Diphenhydramine Anxiety and Other (See Comments)     Past Medical History:   Diagnosis Date    Anxiety disorder, unspecified     Depression     Essential fatty acid deficiency     Gastric motility disorder     Graves disease     History of ovarian cyst     frequent, reoccurring with hx of rupture and hemorrhage    Iron deficiency anemia, unspecified     Pelvic congestion syndrome     Poor dentition     POTS (postural orthostatic tachycardia syndrome)     Smoker     Vitamin D deficiency      Past Surgical History:   Procedure Laterality Date    CHOLECYSTECTOMY      DX-LAP, EVACUATION HEMOPERITONEUM  11/30/2021    GALLBLADDER SURGERY      LAPAROSCOPIC CHOLECYSTECTOMY N/A 10/10/2022    Procedure: CHOLECYSTECTOMY, LAPAROSCOPIC;  Surgeon: Tobin Aguiar MD;  Location: [facility];  Service: General;  Laterality: N/A;    TONSILLECTOMY  11/2011     Family History   Problem Relation Name Age of Onset    Heart disease Paternal Grandfather Dany Baeza II     Cancer Paternal Grandfather Dany Baeza II     Stroke Paternal  "Grandfather Dany Baeza II     Heart disease Paternal Grandmother Sophie Baeza     Pancreatic cancer Maternal Grandmother Yahaira "Casi" Nadira     Ovarian cancer Maternal Grandmother Yahaira "Casi" Nadira     Cancer Maternal Grandmother Yahaira "Casi" Nadira     Heart disease Father Dany Baeza III     Heart attack Father Dany Baeza III     Coronary artery disease Father Dany Baeza III     Cancer Mother Priscilla Janene         colorectal cancer    Bipolar disorder Mother Priscilla Janene     Irritable bowel syndrome Mother Priscilla Janene     No Known Problems Brother      Kidney disease Sister      No Known Problems Son 2     Stroke Paternal Aunt Emma Baeza     Stroke Maternal Aunt Sara Warner      Social History     Tobacco Use    Smoking status: Every Day     Current packs/day: 1.50     Average packs/day: 1.3 packs/day for 30.3 years (38.5 ttl pk-yrs)     Types: Cigarettes     Start date: 1/1/2008     Passive exposure: Never    Smokeless tobacco: Never   Substance Use Topics    Alcohol use: Never    Drug use: Not Currently     Review of Systems   Constitutional:  Negative for chills, diaphoresis, fatigue and fever.   HENT:  Positive for dental problem and sinus pressure. Negative for congestion, drooling, ear pain, facial swelling, nosebleeds, postnasal drip, rhinorrhea, sneezing, sore throat and trouble swallowing.    Respiratory:  Negative for shortness of breath and wheezing.    Cardiovascular:  Negative for chest pain, palpitations and leg swelling.   Gastrointestinal:  Negative for abdominal pain, constipation, diarrhea and nausea.   Genitourinary:  Negative for dysuria.   Neurological:  Positive for headaches. Negative for dizziness and facial asymmetry.       Physical Exam     Initial Vitals [05/02/24 1021]   BP Pulse Resp Temp SpO2   116/78 100 18 97.9 °F (36.6 °C) 100 %      MAP       --         Physical Exam    Nursing note and vitals reviewed.  Constitutional: She appears well-developed and well-nourished. " She is not diaphoretic. No distress.   HENT:   Head: Normocephalic and atraumatic.   Poor dentition   Eyes: Conjunctivae and EOM are normal. Pupils are equal, round, and reactive to light. Right eye exhibits no discharge. Left eye exhibits no discharge.   Neck: No tracheal deviation present. No JVD present.   Cardiovascular:  Normal rate, regular rhythm, normal heart sounds and intact distal pulses.           Pulmonary/Chest: Breath sounds normal. She has no wheezes.   Musculoskeletal:         General: No tenderness or edema. Normal range of motion.     Neurological: She is alert and oriented to person, place, and time. GCS score is 15. GCS eye subscore is 4. GCS verbal subscore is 5. GCS motor subscore is 6.   Skin: Skin is warm and dry.   Psychiatric:   Anxious         ED Course   Procedures  Labs Reviewed   COMPREHENSIVE METABOLIC PANEL - Abnormal; Notable for the following components:       Result Value    Globulin 3.6 (*)     All other components within normal limits   URINALYSIS, REFLEX TO URINE CULTURE - Abnormal; Notable for the following components:    Color, UA Colorless (*)     Squamous Epithelial Cells, UA Few (*)     Mucous, UA Trace (*)     All other components within normal limits   CBC WITH DIFFERENTIAL - Abnormal; Notable for the following components:    MPV 11.7 (*)     All other components within normal limits   COVID/RSV/FLU A&B PCR - Normal    Narrative:     The Xpert Xpress SARS-CoV-2/FLU/RSV plus is a rapid, multiplexed real-time PCR test intended for the simultaneous qualitative detection and differentiation of SARS-CoV-2, Influenza A, Influenza B, and respiratory syncytial virus (RSV) viral RNA in either nasopharyngeal swab or nasal swab specimens.         CBC W/ AUTO DIFFERENTIAL    Narrative:     The following orders were created for panel order CBC Auto Differential.  Procedure                               Abnormality         Status                     ---------                                -----------         ------                     CBC with Differential[[id number]]       Abnormal            Final result                 Please view results for these tests on the individual orders.   EXTRA TUBES    Narrative:     The following orders were created for panel order EXTRA TUBES.  Procedure                               Abnormality         Status                     ---------                               -----------         ------                     Light Blue Top Hold[[id number]]                             Final result               Red Top Hold[[id number]]                                    Final result               Gold Top Hold[[id number]]                                   Final result               Pink Top Hold[[id number]]                                   Final result                 Please view results for these tests on the individual orders.   LIGHT BLUE TOP HOLD   RED TOP HOLD   GOLD TOP HOLD   PINK TOP HOLD   POCT URINE PREGNANCY          Imaging Results    None          Medications - No data to display  Medical Decision Making  Patient is 29-year-old female who presents to the ED due to sinus pressure for 3 weeks.  Patient has not had any congestion, sore throat, nasal drainage.  Patient reports pain is unrelieved with ibuprofen 400 mg rotated with Tylenol 1000 mg.  Patient's CBC and CMP are unremarkable.  Patient was reassured that she was not having an aneurysm.  We will discharge with ibuprofen 600 mg, Claritin D, and Flonase.  Patient would like referral to ENT however there is no emergent need for referral to ENT in emergency department today.  Patient hemodynamically stable at time of discharge.    Amount and/or Complexity of Data Reviewed  Labs: ordered. Decision-making details documented in ED Course.    Risk  OTC drugs.  Prescription drug management.              Attending Attestation:   Physician Attestation Statement for Resident:  As the supervising MD   Physician  Attestation Statement: I have personally seen and examined this patient.   I agree with the above history.  -:   As the supervising MD I agree with the above PE.     As the supervising MD I agree with the above treatment, course, plan, and disposition.    I have reviewed and agree with the residents interpretation of the following: lab data.                                        Clinical Impression:  Final diagnoses:  [J34.89] Sinus pressure (Primary)          ED Disposition Condition    Discharge Stable          ED Prescriptions       Medication Sig Dispense Start Date End Date Auth. Provider    loratadine-pseudoephedrine  mg (CLARITIN-D 24-HOUR)  mg per 24 hr tablet Take 1 tablet by mouth once daily. for 10 days -- 5/2/2024 5/12/2024 Josh Rebolledo MD    fluticasone propionate (FLONASE) 50 mcg/actuation nasal spray 1 spray (50 mcg total) by Each Nostril route 2 (two) times daily as needed for Rhinitis. 15 g 5/2/2024 -- Josh Rebolledo MD    ibuprofen (ADVIL,MOTRIN) 600 MG tablet Take 1 tablet (600 mg total) by mouth every 6 (six) hours as needed for Pain. 20 tablet 5/2/2024 -- Josh Rebolledo MD          Follow-up Information       Follow up With Specialties Details Why Contact Info    Courtney Dhaliwal MD Internal Medicine Call in 2 days  [address] [facility] [address]  [phone]      [facility] Emergency Medicine  As needed [address] [facility] [address] [phone]             Josh Rebolledo MD  Resident  05/02/24 [id number]       Stevie Cui MD  05/02/24 0205

## 2024-05-02 NOTE — FIRST PROVIDER EVALUATION
Medical screening examination initiated.  I have conducted a focused provider triage encounter, findings are as follows:    Brief history of present illness:  29 year old female with headache, nose/sinus pain, intermittent nose bleed, blurry vision, injury to nose months ago    Vitals:    05/02/24 1021   BP: 116/78   BP Location: Left arm   Patient Position: Sitting   Pulse: 100   Resp: 18   Temp: 97.9 °F (36.6 °C)   TempSrc: Temporal   SpO2: 100%   Weight: 51.1 kg (112 lb 10.5 oz)       Pertinent physical exam:      Brief workup plan:  blood work, swabs    Preliminary workup initiated; this workup will be continued and followed by the physician or advanced practice provider that is assigned to the patient when roomed.

## 2024-06-11 ENCOUNTER — HOSPITAL ENCOUNTER (EMERGENCY)
Facility: HOSPITAL | Age: 29
Discharge: HOME | End: 2024-06-11
Attending: STUDENT IN AN ORGANIZED HEALTH CARE EDUCATION/TRAINING PROGRAM
Payer: MEDICAID

## 2024-06-11 ENCOUNTER — OFFICE VISIT (OUTPATIENT)
Dept: ENDOCRINOLOGY | Facility: CLINIC | Age: 29
End: 2024-06-11
Payer: MEDICAID

## 2024-06-11 ENCOUNTER — DOCUMENTATION ONLY (OUTPATIENT)
Facility: CLINIC | Age: 29
End: 2024-06-11
Payer: MEDICAID

## 2024-06-11 VITALS
RESPIRATION RATE: 16 BRPM | DIASTOLIC BLOOD PRESSURE: 75 MMHG | HEIGHT: 60 IN | HEART RATE: 95 BPM | BODY MASS INDEX: 21.6 KG/M2 | OXYGEN SATURATION: 98 % | WEIGHT: 110 LBS | SYSTOLIC BLOOD PRESSURE: 101 MMHG | TEMPERATURE: 98 F

## 2024-06-11 DIAGNOSIS — E05.00 GRAVES DISEASE: Primary | ICD-10-CM

## 2024-06-11 DIAGNOSIS — F32.A DEPRESSION, UNSPECIFIED DEPRESSION TYPE: ICD-10-CM

## 2024-06-11 DIAGNOSIS — J32.9 CHRONIC SINUSITIS, UNSPECIFIED LOCATION: ICD-10-CM

## 2024-06-11 DIAGNOSIS — M54.41 CHRONIC RIGHT-SIDED LOW BACK PAIN WITH RIGHT-SIDED SCIATICA: Primary | ICD-10-CM

## 2024-06-11 DIAGNOSIS — G89.29 CHRONIC RIGHT-SIDED LOW BACK PAIN WITH RIGHT-SIDED SCIATICA: Primary | ICD-10-CM

## 2024-06-11 DIAGNOSIS — E04.2 MULTINODULAR GOITER: ICD-10-CM

## 2024-06-11 DIAGNOSIS — R00.2 PALPITATIONS: ICD-10-CM

## 2024-06-11 DIAGNOSIS — Z72.0 TOBACCO USE: ICD-10-CM

## 2024-06-11 LAB
AMPHET UR QL SCN: NEGATIVE
B-HCG UR QL: NEGATIVE
BACTERIA #/AREA URNS AUTO: ABNORMAL /HPF
BARBITURATE SCN PRESENT UR: NEGATIVE
BENZODIAZ UR QL SCN: POSITIVE
BILIRUB UR QL STRIP.AUTO: NEGATIVE
CANNABINOIDS UR QL SCN: NEGATIVE
CLARITY UR: CLEAR
COCAINE UR QL SCN: NEGATIVE
COLOR UR AUTO: YELLOW
FENTANYL UR QL SCN: NEGATIVE
GLUCOSE UR QL STRIP: NEGATIVE
HGB UR QL STRIP: ABNORMAL
KETONES UR QL STRIP: NEGATIVE
LEUKOCYTE ESTERASE UR QL STRIP: ABNORMAL
MDMA UR QL SCN: NEGATIVE
NITRITE UR QL STRIP: NEGATIVE
OPIATES UR QL SCN: NEGATIVE
PCP UR QL: NEGATIVE
PH UR STRIP: 6 [PH]
PH UR: 6 [PH] (ref 3–11)
PROT UR QL STRIP: NEGATIVE
RBC #/AREA URNS AUTO: ABNORMAL /HPF
SP GR UR STRIP.AUTO: <=1.005 (ref 1–1.03)
SPECIFIC GRAVITY, URINE AUTO (.000) (OHS): <=1.005 (ref 1–1.03)
SQUAMOUS #/AREA URNS AUTO: ABNORMAL /HPF
UROBILINOGEN UR STRIP-ACNC: 0.2
WBC #/AREA URNS AUTO: ABNORMAL /HPF

## 2024-06-11 PROCEDURE — 63600175 PHARM REV CODE 636 W HCPCS: Performed by: STUDENT IN AN ORGANIZED HEALTH CARE EDUCATION/TRAINING PROGRAM

## 2024-06-11 PROCEDURE — 96372 THER/PROPH/DIAG INJ SC/IM: CPT | Performed by: STUDENT IN AN ORGANIZED HEALTH CARE EDUCATION/TRAINING PROGRAM

## 2024-06-11 PROCEDURE — 25000003 PHARM REV CODE 250: Performed by: STUDENT IN AN ORGANIZED HEALTH CARE EDUCATION/TRAINING PROGRAM

## 2024-06-11 PROCEDURE — 81003 URINALYSIS AUTO W/O SCOPE: CPT | Performed by: STUDENT IN AN ORGANIZED HEALTH CARE EDUCATION/TRAINING PROGRAM

## 2024-06-11 PROCEDURE — 99214 OFFICE O/P EST MOD 30 MIN: CPT | Mod: 95,25,, | Performed by: NURSE PRACTITIONER

## 2024-06-11 PROCEDURE — 99406 BEHAV CHNG SMOKING 3-10 MIN: CPT | Mod: 95,,, | Performed by: NURSE PRACTITIONER

## 2024-06-11 PROCEDURE — 1159F MED LIST DOCD IN RCRD: CPT | Mod: CPTII,95,, | Performed by: NURSE PRACTITIONER

## 2024-06-11 PROCEDURE — 81025 URINE PREGNANCY TEST: CPT | Performed by: STUDENT IN AN ORGANIZED HEALTH CARE EDUCATION/TRAINING PROGRAM

## 2024-06-11 PROCEDURE — 99285 EMERGENCY DEPT VISIT HI MDM: CPT | Mod: 25

## 2024-06-11 PROCEDURE — 1160F RVW MEDS BY RX/DR IN RCRD: CPT | Mod: CPTII,95,, | Performed by: NURSE PRACTITIONER

## 2024-06-11 PROCEDURE — 80307 DRUG TEST PRSMV CHEM ANLYZR: CPT | Performed by: STUDENT IN AN ORGANIZED HEALTH CARE EDUCATION/TRAINING PROGRAM

## 2024-06-11 RX ORDER — DEXAMETHASONE SODIUM PHOSPHATE 4 MG/ML
8 INJECTION, SOLUTION INTRA-ARTICULAR; INTRALESIONAL; INTRAMUSCULAR; INTRAVENOUS; SOFT TISSUE
Status: COMPLETED | OUTPATIENT
Start: 2024-06-11 | End: 2024-06-11

## 2024-06-11 RX ORDER — HYDROCODONE BITARTRATE AND ACETAMINOPHEN 5; 325 MG/1; MG/1
1 TABLET ORAL
Status: COMPLETED | OUTPATIENT
Start: 2024-06-11 | End: 2024-06-11

## 2024-06-11 RX ORDER — KETOROLAC TROMETHAMINE 10 MG/1
10 TABLET, FILM COATED ORAL EVERY 6 HOURS PRN
Qty: 20 TABLET | Refills: 0 | Status: SHIPPED | OUTPATIENT
Start: 2024-06-11 | End: 2024-06-16

## 2024-06-11 RX ORDER — METHOCARBAMOL 500 MG/1
500 TABLET, FILM COATED ORAL 4 TIMES DAILY PRN
Qty: 40 TABLET | Refills: 0 | Status: SHIPPED | OUTPATIENT
Start: 2024-06-11 | End: 2024-06-21

## 2024-06-11 RX ORDER — KETOROLAC TROMETHAMINE 30 MG/ML
60 INJECTION, SOLUTION INTRAMUSCULAR; INTRAVENOUS
Status: COMPLETED | OUTPATIENT
Start: 2024-06-11 | End: 2024-06-11

## 2024-06-11 RX ADMIN — DEXAMETHASONE SODIUM PHOSPHATE 8 MG: 4 INJECTION, SOLUTION INTRA-ARTICULAR; INTRALESIONAL; INTRAMUSCULAR; INTRAVENOUS; SOFT TISSUE at 06:06

## 2024-06-11 RX ADMIN — KETOROLAC TROMETHAMINE 60 MG: 30 INJECTION, SOLUTION INTRAMUSCULAR at 06:06

## 2024-06-11 RX ADMIN — HYDROCODONE BITARTRATE AND ACETAMINOPHEN 1 TABLET: 5; 325 TABLET ORAL at 06:06

## 2024-06-11 NOTE — ED PROVIDER NOTES
Encounter Date: 6/11/2024       History     Chief Complaint   Patient presents with    Back Pain     Pt to er c/o back pain onset this afternoon, denies injury.     HPI    29-year-old female with a past medical history of Graves disease, chronic abdominal pain, pots disease, anxiety/depression and chronic back pain presents emergency department today for back pain.  Patient states she is having lower back pain ever since bending down.  States the pain is radiating down to her right leg.  States she [facility] a [facility]sur[facility] and was told to come to the ER for imaging.  She denies any weakness or numbness to the legs.  No bladder or bowel incontinence    Review of patient's allergies indicates:   Allergen Reactions    Benadryl [diphenhydramine hcl] Anxiety     Hallucinations     Diphenhydramine Anxiety and Other (See Comments)     Past Medical History:   Diagnosis Date    Anxiety disorder, unspecified     Depression     Essential fatty acid deficiency     Gastric motility disorder     Graves disease     History of ovarian cyst     frequent, reoccurring with hx of rupture and hemorrhage    Iron deficiency anemia, unspecified     Pelvic congestion syndrome     Poor dentition     POTS (postural orthostatic tachycardia syndrome)     Smoker     Vitamin D deficiency      Past Surgical History:   Procedure Laterality Date    CHOLECYSTECTOMY      DX-LAP, EVACUATION HEMOPERITONEUM  11/30/2021    GALLBLADDER SURGERY      LAPAROSCOPIC CHOLECYSTECTOMY N/A 10/10/2022    Procedure: CHOLECYSTECTOMY, LAPAROSCOPIC;  Surgeon: Tobin Aguiar MD;  Location[facility];  Service: General;  Laterality: N/A;    TONSILLECTOMY  11/2011     Family History   Problem Relation Name Age of Onset    Heart disease Paternal Grandfather Dany Aryan II     Cancer Paternal Grandfather Dany Aryan II     Stroke Paternal Grandfather Dany Aryan II     Heart disease Paternal Grandmother Sophie Baeza     Pancreatic cancer Maternal Grandmother Yahaira  ""Casi" Nadira     Ovarian cancer Maternal Grandmother Yahaira "Casi" Nadira     Cancer Maternal Grandmother Yahaira "Casi" Nadira     Heart disease Father Dany Baeza III     Heart attack Father Dany Baeza III     Coronary artery disease Father Dnay Baeza III     Cancer Mother Priscilla Janene         colorectal cancer    Bipolar disorder Mother Priscilla Janene     Irritable bowel syndrome Mother Priscilla Janene     No Known Problems Brother      Kidney disease Sister      No Known Problems Son 2     Stroke Paternal Aunt Emma Baeza     Stroke Maternal Aunt Sara Warner      Social History     Tobacco Use    Smoking status: Every Day     Current packs/day: 1.50     Average packs/day: 1.3 packs/day for 30.4 years (38.7 ttl pk-yrs)     Types: Cigarettes     Start date: 1/1/2008     Passive exposure: Never    Smokeless tobacco: Never   Substance Use Topics    Alcohol use: Never    Drug use: Not Currently     Review of Systems   Constitutional:  Negative for fever.   HENT:  Negative for sore throat.    Respiratory:  Negative for cough and shortness of breath.    Cardiovascular:  Negative for chest pain.   Gastrointestinal:  Negative for abdominal pain, constipation, diarrhea, nausea and vomiting.   Genitourinary:  Negative for dysuria.   Musculoskeletal:  Positive for back pain.   Skin:  Negative for rash.   Neurological:  Negative for weakness and headaches.   Hematological:  Does not bruise/bleed easily.   All other systems reviewed and are negative.      Physical Exam     Initial Vitals [06/11/24 1735]   BP Pulse Resp Temp SpO2   101/75 (!) 113 20 97.9 °F (36.6 °C) 97 %      MAP       --         Physical Exam    Nursing note and vitals reviewed.  Constitutional: She appears well-developed and well-nourished. She appears distressed.   HENT:   Exquisitely poor dentition   Cardiovascular:  Normal rate and regular rhythm.           Pulmonary/Chest: Breath sounds normal. No respiratory distress. She has no wheezes. She has no " rhonchi. She has no rales.   Abdominal: Abdomen is soft. There is no abdominal tenderness. There is no rebound and no guarding.   Musculoskeletal:         General: Tenderness (Tenderness to the lumbar spine more so at the L3/4 region) present. Normal range of motion.     Neurological: She is alert and oriented to person, place, and time. She has normal strength.   Skin: Skin is warm. Capillary refill takes less than 2 seconds.         ED Course   Procedures  Labs Reviewed   URINALYSIS, REFLEX TO URINE CULTURE - Abnormal; Notable for the following components:       Result Value    Blood, UA Trace (*)     Leukocyte Esterase, UA Moderate (*)     All other components within normal limits   URINALYSIS, MICROSCOPIC - Abnormal; Notable for the following components:    Bacteria, UA Few (*)     WBC, UA 6-10 (*)     Squamous Epithelial Cells, UA Moderate (*)     All other components within normal limits   PREGNANCY TEST, URINE RAPID - Normal   DRUG SCREEN, URINE (BEAKER)          Imaging Results              CT Lumbar Spine Without Contrast (Final result)  Result time 06/11/24 18:52:07      Final result by Krystin Helton MD (06/11/24 18:52:07)                   Impression:      1. No acute abnormality identified.  2. Mild degenerative changes with small disc protrusion at L4-L5.  No evidence of significant canal stenosis.      Electronically signed by: Krystin Helton  Date:    06/11/2024  Time:    18:52               Narrative:    EXAMINATION:  CT LUMBAR SPINE WITHOUT CONTRAST    CLINICAL HISTORY:  Low back pain, symptoms persist with > 6wks conservative treatment;    TECHNIQUE:  Noncontrast CT images of the lumbar spine. Axial, coronal, and sagittal reformatted images were obtained. Dose length product is 325 mGycm. Automatic exposure control, adjustment of mA/kV or iterative reconstruction technique was used to limit radiation dose.    COMPARISON:  CT lumbar spine dated 06/09/2021    FINDINGS:  There are 5  non-rib-bearing lumbar type vertebral bodies.  There are bilateral L5 pars defects, with grade 1 anterolisthesis, unchanged.  Alignment is otherwise normal.  The vertebral body heights and disc spaces are maintained.  There is no acute fracture.  A disc protrusion at L4-5 measures 4 mm in AP dimension, without significant narrowing of the spinal canal.  There is mild neural foraminal narrowing bilaterally at L5-S1 there is no paraspinal hematoma..                                       Medications   ketorolac injection 60 mg (60 mg Intramuscular Given 6/11/24 1825)   dexAMETHasone injection 8 mg (8 mg Intramuscular Given 6/11/24 1825)   HYDROcodone-acetaminophen 5-325 mg per tablet 1 tablet (1 tablet Oral Given 6/11/24 1824)     Medical Decision Making  Initial Assessment:   Low back pain    Differential Diagnosis:   Judging by the patient's chief complaint and pertinent history, the patient has the following possible differential diagnoses, including but not limited to the following.  Some of these are deemed to be lower likelihood and some more likely based on my physical exam and history combined with possible lab work and/or imaging studies.   Please see the pertinent studies, and refer to the HPI.  Some of these diagnoses will take further evaluation to fully rule out, perhaps as an outpatient and the patient was encouraged to follow up when discharged for more comprehensive evaluation.  Chronic back pain, paraspinal muscle spasm, sciatica,  as well as multiple other possible etiologies      Problems Addressed:  Chronic right-sided low back pain with right-sided sciatica: chronic illness or injury with exacerbation, progression, or side effects of treatment    Amount and/or Complexity of Data Reviewed  External Data Reviewed: labs, radiology and notes.  Radiology: ordered.    Risk  Prescription drug management.               ED Course as of 06/11/24 1854 Tue Jun 11, 2024   1844 Patient walking with steady  gait   [BS]      ED Course User Index  [BS] Ashish Knowles MD                           Clinical Impression:  Final diagnoses:  [M54.41, G89.29] Chronic right-sided low back pain with right-sided sciatica (Primary)          ED Disposition Condition    Discharge Stable          ED Prescriptions       Medication Sig Dispense Start Date End Date Auth. Provider    ketorolac (TORADOL) 10 mg tablet Take 1 tablet (10 mg total) by mouth every 6 (six) hours as needed. 20 tablet 6/11/2024 6/16/2024 Ashish Knowles MD    methocarbamoL (ROBAXIN) 500 MG Tab Take 1 tablet (500 mg total) by mouth 4 (four) times daily as needed (back pain). 40 tablet 6/11/2024 6/21/2024 Ashish Knowles MD          Follow-up Information       Follow up With Specialties Details Why Contact Info    Courtney Dhaliwal MD Internal Medicine Schedule an appointment as soon as possible for a visit   [address] [facility] [address]  [phone]      [facility] - Emergency Dept Emergency Medicine Go to  If symptoms worsen [id number]  [facility] [id number] [phone]             Ashish Knowles MD  06/11/24 185       Ashish Knowles MD  06/11/24 1852

## 2024-06-11 NOTE — PROGRESS NOTES
Subjective     Patient ID: Royce Quigley is a 29 y.o. female.    Chief Complaint: Graves' Disease    Previous Endocrine Clinic Note:      10/15/2020 Telemedicine Visit Note- Endocrine Clinic: 24 y/o female schedule for endocrine clinic F/U for Graves disease. Pt is 7 weeks post-partum and currently on MMI 5mg daily. TSH 0.043 Free T4 1.20 T4 9.1.  Patient reports a couple of weeks after pregnancy and she felt like she was hyperthyroid and had an old prescription of MMI at home and started on 5 mg, then she states 3 weeks ago she went to her primary care provider and was prescribed a new prescription of MMI 5 mg.  She states she has been on the new prescription that is not  for approximately 3 weeks.  She reports that her symptoms of palpitations, tremors and anxiety have slightly improved but she still continues with palpitations, weight loss.     2021 Endocrine Clinic Note: 26 year old female scheduled today's endocrine clinic follow-up for Graves' disease.  Patient was previously in remission with pregnancy and delivered 2020.  4 to 5 weeks after pregnancy patient felt like she was returning to hyperthyroidism and had restarted her MMI. Last documentation 2020 patient's TSH was 10.521 MMI 10 mg twice a day was reduced to once a day. She is currently MMI 5mg (1/2 of 10mg tablet), She reports she was seeing a health care provider in [place] who changed her medication. Pt states she feel speedy now, with palpitations, anxiety. Pt weight has increased. Will check labs today. (1)     2021 Endocrine Clinic Note: 26 year old female scheduled today for Endocrine follow-up.  History of Graves' disease/palpitations.  Patient has continued her MMI 5 mg and states when she ran out she had an old prescription that was 2-3 years old that she restarted she states she is severely worried about going back into hyperthyroidism.  Current labs TSH 0.855, free T4 1.0015 mg of MMI.  Discussed  with patient holding MMI to assess for remission patient states that she has a great fear from returning to hyperthyroidism and she is currently symptomatic with weight loss and palpitations and does not want to stop her MMI.  Instructed patient we will decrease to a half of a tablet and recheck labs in 3 weeks and to reconsider holding MMI.  Tobacco use patient smokes half a pack per day encourage cessation. (1)     3/15/2022 endocrine clinic note: 27-year-old female scheduled today for endocrine clinic follow-up. History of Graves' disease/palpitations. Graves' disease patient is currently in MMI 5 mg most recent TSH On 01/25/2022 TSH 2.2292.  Patient reports continued palpitations, excessive sweating and reports weight loss.  Patient also has a history of anxiety.  Patient is currently undergoing a cardiac work-up for palpitations and also was referred to gastro for an upper scope but has not completed yet. Enlarged thyroid multinodular goiter patient had ultrasound 6/28/2019 IMPRESSION: Heterogenicity in hypervascularity of the thyroid gland might be related to thyroid disease. Subcentimeter nodules as above do not meet criteria for biopsy.      Endocrine Clinic Note 07/19/2022: 27-year-old female scheduled today for endocrine clinic follow-up. History of Graves disease, multinodular goiter, enlarged thyroid, palpitations. Previous TSH 2.2292 on 01/25/2022 patient was previously held off MI and reported symptoms of palpitation, increased anxiety and tremors. Currently she is on MMI 5 mg. Palpitations patient reports occasional palpitations she states when she went to her PCP 1 week ago her  today patient's heart rate is 92 patient states compliance with her propanolol. Multinodular goiter/enlarged thyroid repeat ultrasound 03/23/2022 Mild diffuse heterogeneity and hypervascularity at the thyroid gland suggesting diffuse thyroiditis with several small benign sub centimeter nodules as detailed above. No  significant interval change compared to the thyroid ultrasound in 2019 aside from interval decrease in size of the left thyroid nodule.  Patient denies any new palpable nodules but does report mild dysphagia at times. Tobacco use patient is smoking 1 pack per day states she does not want states patient's program at this time she states she is not want to quit due to her anxiety.     Endocrine Clinic Note 01/19/2023:  27-year-old female scheduled today for endocrine clinic follow-up.  History of Graves disease, multinodular goiter and enlarged thyroid, palpitations. TSH 2.0166, Free T3 2.98, Free T4 0.91 on 07/19/2022 patient states when MMI stops she continue with palpitations and extreme anxiety increased symptoms of hyperthyroidism.  Patient remained on MMI 5 mg reports to clinic today and reports occasional palpitations and anxiety.  Patient's low blood pressure today for took a Xanax just prior to her visit along with propranolol 10 mg last night.  Patient currently being followed by cardiologist.  Multinodular goiter repeat ultrasound 03/23/2022 IMPRESSION: Mild diffuse heterogeneity and hypervascularity at the thyroid gland suggesting diffuse thyroiditis with several small benign subcentimeter nodules as detailed above. No significant interval change compared to the thyroid ultrasound in 2019 aside from interval decrease in size of the left thyroid nodule.  Patient is concerned developing type 1 diabetes for grandmother has a history of type 1 diabetes and discussing today like she is pancreatic insufficiency.  Patient's recent glucose of 139 previous glucoses were within normal limits.      Endocrine clinic note 07/24/2023:  28-year-old female day endocrine clinic follow-up.  History of Graves disease, multinodular goiter enlarged thyroid, palpitations.  TSH 2.769, Free T4 1.00, Free T3 3.05 on 01/19/2023.  Patient is currently on MMI 5 mg Patient states when dose is lowered she has symptoms of palpitations  symptoms of hyperthyroidism.  Multinodular goiter ultrasound 03/23/2023 IMPRESSION: Mild diffuse heterogeneity and hypervascularity at the thyroid gland suggesting diffuse thyroiditis with several small benign subcentimeter nodules as detailed above. No significant interval change compared to the thyroid ultrasound in 2019 aside from interval decrease in size of the left thyroid nodule.  Patient denies any palpable nodules but she does note palpable lymph node close to her right ear that has been palpable she states for over a year nontender.         Telemedicine Notes:  Endocrine clinic note 11/28/2023:  28-year-old female scheduled today for endocrine clinic follow-up.  History of Graves disease, multinodular goiter with enlarged thyroid and palpitations. Graves disease pt is on MMI 2.5 mg previous TSH 4.623, free T3 2.87, Free T4 0.79 on 07/24/2023 MMI reduced to half a tablet at that time.  Attempted to DC methimazole of increased anxiety and palpitations once off methimazole.  Pt takes Xanax for her anxiety.  Patient reports enlarged thyroid which is comfortable she states she is considering a thyroidectomy but wants to wait until her stomach issues resolved.  Discussed with the patient if she desires thyroidectomy we will hold MMI to see if she goes hyperthyroid before thyroidectomy can be done.          Telemedicine Notes:  Endocrine clinic note 03/05/2024:  28 year female scheduled today for endocrine clinic follow-up.  History of Graves disease and autoimmune thyroiditis palpitations and history of anxiety.  Patient was on methimazole 2.5 mg Free T4 0.79 Free T3 3.03 on 02/28/2024, TPO >1.543, TSI 1.1, Trab 3.40 on 02/28/2024.  Patient reports she continues with anxiety whether related thyroid has a history of anxiety.  Patient does not report any weight changes.  She does report fatigue.        The patient location is:  Home  The chief complaint leading to consultation is:  Graves disease    Visit type:  audiovisual    Face to Face time with patient: 18  30 minutes of total time spent on the encounter, which includes face to face time and non-face to face time preparing to see the patient (eg, review of tests), Obtaining and/or reviewing separately obtained history, Documenting clinical information in the electronic or other health record, Independently interpreting results (not separately reported) and communicating results to the patient/family/caregiver, or Care coordination (not separately reported).         Each patient to whom he or she provides medical services by telemedicine is:  (1) informed of the relationship between the physician and patient and the respective role of any other health care provider with respect to management of the patient; and (2) notified that he or she may decline to receive medical services by telemedicine and may withdraw from such care at any time.    Telemedicine Notes:  Endocrine clinic note 06/11/2024:  29-year-old female scheduled today for endocrine clinic follow-up.  History of Graves disease and autoimmune thyroiditis with palpitations and history of anxiety.  Patient is currently on methimazole 2.5 mg previous labs Free T4 0.79 Free T3 3.03 on 02/28/2024, TPO >1.543, TSI 1.1, Trab 3.40 on 02/28/2024.  Patient had labs in the system to complete put has limitations with transportation was unable to complete.  Patient on today's visit had a positive screening for depression she states this is due to all her health issues and having limited transportation to get to visits and having to see so many specialists.  Patient was previously seen in ENT for chronic sinusitis a needing surgery but states the ENT moved and she had no longer has an ENT.  Multinodular goiter ultrasound 07/24/2023 subcentimeter.  Tobacco abuse patient states she is up to 1 pack per day currently with her anxiety she has not desiring cessation.             Narrative & Impression  Thyroid ultrasound      INDICATION: 27-year-old woman with enlarged thyroid gland/nodules.     TECHNIQUE: Real-time grayscale and color Doppler sonographic  evaluation of the thyroid gland was performed per routine protocol.     COMPARISON: Thyroid ultrasound 6/28/2019.     FINDINGS:     There is mild enlargement at the right thyroid lobe with the gland  otherwise normal and size and contour. The right thyroid lobe measures  5.4 x 1.6 x 1.9 cm and the left lobe 4.5 x 1.6 x 1.4 cm. The isthmus  measures just under 0.2 cm in AP thickness. A small colloid cyst at  the right thyroid lobe measures 5 x 3 x 4 mm and a second small and  well marginated solid hypoechoic nodule at the right posterior thyroid  lobe 5 x 3 x 5 mm. A single small colloid cyst in the left thyroid  lobe measures 3 x 2 x 3 mm. There is mild diffuse heterogeneity of the  thyroid gland parenchyma along with diffuse hypervascularity at the  gland.     IMPRESSION:     Mild diffuse heterogeneity and hypervascularity at the thyroid gland  suggesting diffuse thyroiditis with several small benign subcentimeter  nodules as detailed above. No significant interval change compared to  the thyroid ultrasound in 2019 aside from interval decrease in size of  the left thyroid nodule.  Electronically Signed By: Erasmo Pa MD  Date/Time Signed: 03/23/2022 16:27     Specimen Collected: 03/23/22 14:13 Last Resulted: 03/23/22 14:13           Review of Systems   Constitutional:  Positive for fatigue. Negative for activity change and appetite change.   HENT:  Negative for dental problem, hearing loss, tinnitus, trouble swallowing and goiter.    Eyes:  Negative for photophobia, pain and visual disturbance.   Respiratory:  Negative for cough, chest tightness and wheezing.    Cardiovascular:  Negative for chest pain, palpitations and leg swelling.   Gastrointestinal:  Positive for abdominal pain, change in bowel habit and nausea. Negative for constipation, diarrhea and reflux.   Endocrine:  Negative for cold intolerance, heat intolerance, polydipsia and polyphagia.   Genitourinary:  Negative for difficulty urinating, flank pain, hematuria, hot flashes, menstrual irregularity, menstrual problem, nocturia and urgency.   Musculoskeletal:  Negative for back pain, gait problem, joint swelling, leg pain and joint deformity.   Integumentary:  Negative for color change, pallor, rash and breast discharge.   Allergic/Immunologic: Negative for environmental allergies, food allergies and immunocompromised state.   Neurological:  Positive for headaches. Negative for tremors, seizures, memory loss and coordination difficulties.   Psychiatric/Behavioral:  Positive for agitation. Negative for behavioral problems and sleep disturbance. The patient is nervous/anxious.           Objective     Physical Exam  Constitutional:       Appearance: Normal appearance.   HENT:      Head: Normocephalic.      Nose: Nose normal.   Eyes:      Conjunctiva/sclera: Conjunctivae normal.   Pulmonary:      Effort: Pulmonary effort is normal.   Musculoskeletal:      Cervical back: Normal range of motion.   Neurological:      Mental Status: She is alert.   Psychiatric:         Mood and Affect: Mood normal.         Behavior: Behavior normal.         Thought Content: Thought content normal.         Judgment: Judgment normal.            Assessment and Plan     1. Graves disease  On MMI 2.5 mg   Free T4 0.79 Free T3 3.03 on 02/28/2024  TPO >1.543, TSI 1.1, Trab 3.40 on 02/28/2024   Labs this week TSH, Free T4, Free T3              Component Ref Range & Units 3 mo ago  (2/28/24) 10 mo ago  (7/24/23) 1 yr ago  (1/19/23) 1 yr ago  (7/19/22) 2 yr ago  (12/19/21) 2 yr ago  (9/30/21) 2 yr ago  (9/16/21)   Thyroxine Free 0.70 - 1.48 ng/dL 0.79 0.79 1.00 0.91 0.91 1.00 0.97        Component Ref Range & Units 3 mo ago  (2/28/24) 10 mo ago  (7/24/23) 1 yr ago  (1/19/23) 1 yr ago  (7/19/22) 3 yr ago  (3/22/21) 4 yr ago  (3/23/20) 4 yr ago  (2/20/20)   T3  Free 1.58 - 3.91 pg/mL 3.03 2.87 3.05 R 2.98 R 3.02 R 2.61 R 2.90 R           Component Ref Range & Units 3 mo ago 10 mo ago   TPO Ab Quant <25 IU/mL >1,542 High  1,313 High  CM              Component Ref Range & Units 3 mo ago 10 mo ago   Thyroid-Stimulating Immunoglob <=1.3 TSI index 1.1 1.2 CM        Component Ref Range & Units 3 mo ago 10 mo ago   Thyrotropin Receptor Ab 0.00 - 1.75 IU/L 3.40 High  3.25 High  CM   -     T4, Free; Future  -     T3, Free (OLG); Future  -     TSH; Future    2. Palpitations  Continues w/ palpitations  No medication does time likely anxiety related    3. Multinodular goiter  Subcentimeter meter nodules ultrasound 07/24/2023    4. Tobacco use  Discussed with the patient's smoking cessation  Smoking 1/2-1 pack a day    5. Depression   Over health issues   On Cymbalta and Xanax   Missing Cymbalta occasionally     6. Chronic sinusitis, unspecified location  -     Ambulatory referral/consult to ENT; Future          Follow up in about 3 months (around 9/11/2024) for Graves, virtual visit.

## 2024-06-19 ENCOUNTER — OFFICE VISIT (OUTPATIENT)
Dept: OTOLARYNGOLOGY | Facility: CLINIC | Age: 29
End: 2024-06-19
Payer: MEDICAID

## 2024-06-19 DIAGNOSIS — J32.9 CHRONIC SINUSITIS, UNSPECIFIED LOCATION: ICD-10-CM

## 2024-06-19 PROCEDURE — 99203 OFFICE O/P NEW LOW 30 MIN: CPT | Mod: 95,,, | Performed by: NURSE PRACTITIONER

## 2024-06-19 PROCEDURE — 1159F MED LIST DOCD IN RCRD: CPT | Mod: CPTII,95,, | Performed by: NURSE PRACTITIONER

## 2024-06-19 RX ORDER — IBUPROFEN 600 MG/1
400 TABLET ORAL
COMMUNITY
Start: 2024-05-02

## 2024-06-19 RX ORDER — AZELASTINE 1 MG/ML
1 SPRAY, METERED NASAL 2 TIMES DAILY
Qty: 30 ML | Refills: 5 | Status: SHIPPED | OUTPATIENT
Start: 2024-06-19 | End: 2025-06-19

## 2024-06-19 NOTE — PROGRESS NOTES
Audio Only Telehealth Visit     The patient location is: state [facility]  The chief complaint leading to consultation is: sinus concerns  Visit type: Virtual visit with audio only (telephone)  Total time spent on visit: 30 min     The reason for the audio only service rather than synchronous audio and video virtual visit was related to technical difficulties or patient preference/necessity.     Each patient to whom I provide medical services by telemedicine is:  (1) informed of the relationship between the physician and patient and the respective role of any other health care provider with respect to management of the patient; and (2) notified that they may decline to receive medical services by telemedicine and may withdraw from such care at any time. Patient verbally consented to receive this service via voice-only telephone call.       HPI: 06/19/2024: 29 y.o. female referred for sinus concerns. She c/o headaches and facial pain for years off/on. Pain is located periorbitally L>R and around the bridge of her nose. States she had seen Dr. Bess in the past. Endorses nasal congestion L>R, PND, & hyposmia/hypogeusia. She has been on abx many times over the past few years for sinus infections. States sometimes they improve symptoms but sometimes do not. Reports she also has poor dentition which Dr. Bess felt could be contributing to symptoms. States she saw a dentist and is having work done as she can afford it. Infrequently uses NSI. Has been using flonase on/off for a few years, states she has used it consistently for months at a time. She has taken antihistamines and singulair in the past without benefit. Reports having an unremarkable allergy test when she was younger.     Imaging:        Assessment and plan:  29 y.o. female with sinus concerns. CT neck 8/2023 with mild right maxillary mucosal thickening and significant left septal spur. D/w Dr. Owusu- may benefit from septo-turbs +/- right anterior ESS.  Discussed that this would not be treatment for headaches but would improve nasal breathing.   - NSI BID  - Follow with flonase + Astelin BID (reviewed technique)  - Encouraged h/a dietary modifications  - F/u with PCP regarding headache medical tx (likely migraine component)  - RTC 6 wks on Res day/template    Uyen Irvin NP      This service was not originating from a related E/M service provided within the previous 7 days nor will  to an E/M service or procedure within the next 24 hours or my soonest available appointment.  Prevailing standard of care was able to be met in this audio-only visit.

## 2024-06-24 ENCOUNTER — PATIENT MESSAGE (OUTPATIENT)
Dept: ENDOCRINOLOGY | Facility: CLINIC | Age: 29
End: 2024-06-24
Payer: MEDICAID

## 2024-07-10 ENCOUNTER — LAB VISIT (OUTPATIENT)
Dept: LAB | Facility: HOSPITAL | Age: 29
End: 2024-07-10
Attending: NURSE PRACTITIONER
Payer: MEDICAID

## 2024-07-10 DIAGNOSIS — E05.00 GRAVES DISEASE: ICD-10-CM

## 2024-07-10 LAB
T3FREE SERPL-MCNC: 2.64 PG/ML (ref 1.58–3.91)
T4 FREE SERPL-MCNC: 0.75 NG/DL (ref 0.7–1.48)
TSH SERPL-ACNC: 3.94 UIU/ML (ref 0.35–4.94)

## 2024-07-10 PROCEDURE — 84481 FREE ASSAY (FT-3): CPT

## 2024-07-10 PROCEDURE — 84443 ASSAY THYROID STIM HORMONE: CPT

## 2024-07-10 PROCEDURE — 84439 ASSAY OF FREE THYROXINE: CPT

## 2024-07-10 PROCEDURE — 36415 COLL VENOUS BLD VENIPUNCTURE: CPT

## 2024-07-18 ENCOUNTER — TELEPHONE (OUTPATIENT)
Dept: ENDOCRINOLOGY | Facility: CLINIC | Age: 29
End: 2024-07-18
Payer: MEDICAID

## 2024-07-18 NOTE — TELEPHONE ENCOUNTER
Pt notified and voiced hesitation, but will call clinic if she has hyperthyroid symptoms. Pt voiced she will stop her Methimazole and repeat labs in 3 weeks.       ----- Message from Meredith Sales NP sent at 7/16/2024  8:03 AM CDT -----  Pt did not read her portal message sent last week please call her         Isamarh     You are going mildly hypothyroid. I recommend holding Methimazole for 3 weeks and repeating labs. Lab orders are in the system

## 2024-08-14 PROBLEM — K59.00 CONSTIPATION: Status: ACTIVE | Noted: 2024-08-14

## 2024-08-14 PROBLEM — R10.2 PELVIC PAIN: Status: ACTIVE | Noted: 2024-08-14

## 2024-08-14 PROBLEM — R39.89 BLADDER PAIN: Status: ACTIVE | Noted: 2024-08-14

## 2024-08-14 PROBLEM — F17.200 SMOKER: Status: ACTIVE | Noted: 2024-08-14

## 2024-08-14 PROBLEM — N94.89 PELVIC CONGESTION SYNDROME: Status: ACTIVE | Noted: 2024-08-14

## 2024-08-14 PROBLEM — N94.6 DYSMENORRHEA: Status: ACTIVE | Noted: 2024-08-14

## 2024-08-14 PROBLEM — N80.03 ADENOMYOSIS: Status: ACTIVE | Noted: 2024-08-14

## 2024-08-14 PROCEDURE — 87591 N.GONORRHOEAE DNA AMP PROB: CPT | Performed by: STUDENT IN AN ORGANIZED HEALTH CARE EDUCATION/TRAINING PROGRAM

## 2024-08-14 PROCEDURE — 87661 TRICHOMONAS VAGINALIS AMPLIF: CPT | Performed by: STUDENT IN AN ORGANIZED HEALTH CARE EDUCATION/TRAINING PROGRAM

## 2024-08-14 PROCEDURE — 87491 CHLMYD TRACH DNA AMP PROBE: CPT | Performed by: STUDENT IN AN ORGANIZED HEALTH CARE EDUCATION/TRAINING PROGRAM

## 2024-08-14 PROCEDURE — 87624 HPV HI-RISK TYP POOLED RSLT: CPT | Performed by: STUDENT IN AN ORGANIZED HEALTH CARE EDUCATION/TRAINING PROGRAM

## 2024-09-10 ENCOUNTER — OFFICE VISIT (OUTPATIENT)
Dept: ENDOCRINOLOGY | Facility: CLINIC | Age: 29
End: 2024-09-10
Payer: MEDICAID

## 2024-09-10 DIAGNOSIS — E05.00 GRAVES DISEASE: Primary | ICD-10-CM

## 2024-09-10 DIAGNOSIS — Z72.0 TOBACCO USE: ICD-10-CM

## 2024-09-10 DIAGNOSIS — E04.2 MULTINODULAR GOITER: ICD-10-CM

## 2024-09-10 DIAGNOSIS — R00.2 PALPITATIONS: ICD-10-CM

## 2024-09-10 PROCEDURE — 1159F MED LIST DOCD IN RCRD: CPT | Mod: CPTII,95,, | Performed by: NURSE PRACTITIONER

## 2024-09-10 PROCEDURE — 99214 OFFICE O/P EST MOD 30 MIN: CPT | Mod: 95,25,, | Performed by: NURSE PRACTITIONER

## 2024-09-10 PROCEDURE — 99406 BEHAV CHNG SMOKING 3-10 MIN: CPT | Mod: 95,,, | Performed by: NURSE PRACTITIONER

## 2024-09-10 PROCEDURE — 1160F RVW MEDS BY RX/DR IN RCRD: CPT | Mod: CPTII,95,, | Performed by: NURSE PRACTITIONER

## 2024-09-10 RX ORDER — AMOXICILLIN 500 MG/1
500 CAPSULE ORAL 4 TIMES DAILY
COMMUNITY
Start: 2024-07-19

## 2024-09-10 NOTE — PROGRESS NOTES
Subjective     Patient ID: Royce Quigley is a 29 y.o. female.    Chief Complaint: Graves' Disease    Previous Endocrine Clinic Note:      10/15/2020 Telemedicine Visit Note- Endocrine Clinic: 24 y/o female schedule for endocrine clinic F/U for Graves disease. Pt is 7 weeks post-partum and currently on MMI 5mg daily. TSH 0.043 Free T4 1.20 T4 9.1.  Patient reports a couple of weeks after pregnancy and she felt like she was hyperthyroid and had an old prescription of MMI at home and started on 5 mg, then she states 3 weeks ago she went to her primary care provider and was prescribed a new prescription of MMI 5 mg.  She states she has been on the new prescription that is not  for approximately 3 weeks.  She reports that her symptoms of palpitations, tremors and anxiety have slightly improved but she still continues with palpitations, weight loss.     2021 Endocrine Clinic Note: 26 year old female scheduled today's endocrine clinic follow-up for Graves' disease.  Patient was previously in remission with pregnancy and delivered 2020.  4 to 5 weeks after pregnancy patient felt like she was returning to hyperthyroidism and had restarted her MMI. Last documentation 2020 patient's TSH was 10.521 MMI 10 mg twice a day was reduced to once a day. She is currently MMI 5mg (1/2 of 10mg tablet), She reports she was seeing a health care provider in [place] who changed her medication. Pt states she feel speedy now, with palpitations, anxiety. Pt weight has increased. Will check labs today. (1)     2021 Endocrine Clinic Note: 26 year old female scheduled today for Endocrine follow-up.  History of Graves' disease/palpitations.  Patient has continued her MMI 5 mg and states when she ran out she had an old prescription that was 2-3 years old that she restarted she states she is severely worried about going back into hyperthyroidism.  Current labs TSH 0.855, free T4 1.0015 mg of MMI.  Discussed  with patient holding MMI to assess for remission patient states that she has a great fear from returning to hyperthyroidism and she is currently symptomatic with weight loss and palpitations and does not want to stop her MMI.  Instructed patient we will decrease to a half of a tablet and recheck labs in 3 weeks and to reconsider holding MMI.  Tobacco use patient smokes half a pack per day encourage cessation. (1)     3/15/2022 endocrine clinic note: 27-year-old female scheduled today for endocrine clinic follow-up. History of Graves' disease/palpitations. Graves' disease patient is currently in MMI 5 mg most recent TSH On 01/25/2022 TSH 2.2292.  Patient reports continued palpitations, excessive sweating and reports weight loss.  Patient also has a history of anxiety.  Patient is currently undergoing a cardiac work-up for palpitations and also was referred to gastro for an upper scope but has not completed yet. Enlarged thyroid multinodular goiter patient had ultrasound 6/28/2019 IMPRESSION: Heterogenicity in hypervascularity of the thyroid gland might be related to thyroid disease. Subcentimeter nodules as above do not meet criteria for biopsy.      Endocrine Clinic Note 07/19/2022: 27-year-old female scheduled today for endocrine clinic follow-up. History of Graves disease, multinodular goiter, enlarged thyroid, palpitations. Previous TSH 2.2292 on 01/25/2022 patient was previously held off MI and reported symptoms of palpitation, increased anxiety and tremors. Currently she is on MMI 5 mg. Palpitations patient reports occasional palpitations she states when she went to her PCP 1 week ago her  today patient's heart rate is 92 patient states compliance with her propanolol. Multinodular goiter/enlarged thyroid repeat ultrasound 03/23/2022 Mild diffuse heterogeneity and hypervascularity at the thyroid gland suggesting diffuse thyroiditis with several small benign sub centimeter nodules as detailed above. No  significant interval change compared to the thyroid ultrasound in 2019 aside from interval decrease in size of the left thyroid nodule.  Patient denies any new palpable nodules but does report mild dysphagia at times. Tobacco use patient is smoking 1 pack per day states she does not want states patient's program at this time she states she is not want to quit due to her anxiety.     Endocrine Clinic Note 01/19/2023:  27-year-old female scheduled today for endocrine clinic follow-up.  History of Graves disease, multinodular goiter and enlarged thyroid, palpitations. TSH 2.0166, Free T3 2.98, Free T4 0.91 on 07/19/2022 patient states when MMI stops she continue with palpitations and extreme anxiety increased symptoms of hyperthyroidism.  Patient remained on MMI 5 mg reports to clinic today and reports occasional palpitations and anxiety.  Patient's low blood pressure today for took a Xanax just prior to her visit along with propranolol 10 mg last night.  Patient currently being followed by cardiologist.  Multinodular goiter repeat ultrasound 03/23/2022 IMPRESSION: Mild diffuse heterogeneity and hypervascularity at the thyroid gland suggesting diffuse thyroiditis with several small benign subcentimeter nodules as detailed above. No significant interval change compared to the thyroid ultrasound in 2019 aside from interval decrease in size of the left thyroid nodule.  Patient is concerned developing type 1 diabetes for grandmother has a history of type 1 diabetes and discussing today like she is pancreatic insufficiency.  Patient's recent glucose of 139 previous glucoses were within normal limits.      Endocrine clinic note 07/24/2023:  28-year-old female day endocrine clinic follow-up.  History of Graves disease, multinodular goiter enlarged thyroid, palpitations.  TSH 2.769, Free T4 1.00, Free T3 3.05 on 01/19/2023.  Patient is currently on MMI 5 mg Patient states when dose is lowered she has symptoms of palpitations  symptoms of hyperthyroidism.  Multinodular goiter ultrasound 03/23/2023 IMPRESSION: Mild diffuse heterogeneity and hypervascularity at the thyroid gland suggesting diffuse thyroiditis with several small benign subcentimeter nodules as detailed above. No significant interval change compared to the thyroid ultrasound in 2019 aside from interval decrease in size of the left thyroid nodule.  Patient denies any palpable nodules but she does note palpable lymph node close to her right ear that has been palpable she states for over a year nontender.         Telemedicine Notes:  Endocrine clinic note 11/28/2023:  28-year-old female scheduled today for endocrine clinic follow-up.  History of Graves disease, multinodular goiter with enlarged thyroid and palpitations. Graves disease pt is on MMI 2.5 mg previous TSH 4.623, free T3 2.87, Free T4 0.79 on 07/24/2023 MMI reduced to half a tablet at that time.  Attempted to DC methimazole of increased anxiety and palpitations once off methimazole.  Pt takes Xanax for her anxiety.  Patient reports enlarged thyroid which is comfortable she states she is considering a thyroidectomy but wants to wait until her stomach issues resolved.  Discussed with the patient if she desires thyroidectomy we will hold MMI to see if she goes hyperthyroid before thyroidectomy can be done.          Telemedicine Notes:  Endocrine clinic note 03/05/2024:  28 year female scheduled today for endocrine clinic follow-up.  History of Graves disease and autoimmune thyroiditis palpitations and history of anxiety.  Patient was on methimazole 2.5 mg Free T4 0.79 Free T3 3.03 on 02/28/2024, TPO >1.543, TSI 1.1, Trab 3.40 on 02/28/2024.  Patient reports she continues with anxiety whether related thyroid has a history of anxiety.  Patient does not report any weight changes.  She does report fatigue.        Telemedicine Notes:  Endocrine clinic note 06/11/2024:  29-year-old female scheduled today for endocrine clinic  follow-up.  History of Graves disease and autoimmune thyroiditis with palpitations and history of anxiety.  Patient is currently on methimazole 2.5 mg previous labs Free T4 0.79 Free T3 3.03 on 02/28/2024, TPO >1.543, TSI 1.1, Trab 3.40 on 02/28/2024.  Patient had labs in the system to complete put has limitations with transportation was unable to complete.  Patient on today's visit had a positive screening for depression she states this is due to all her health issues and having limited transportation to get to visits and having to see so many specialists.  Patient was previously seen in ENT for chronic sinusitis a needing surgery but states the ENT moved and she had no longer has an ENT.  Multinodular goiter ultrasound 07/24/2023 subcentimeter.  Tobacco abuse patient states she is up to 1 pack per day currently with her anxiety she has not desiring cessation.     The patient location is:  Home  The chief complaint leading to consultation is:  Graves disease/palpitations    Visit type: audiovisual    Face to Face time with patient: 15  30 minutes of total time spent on the encounter, which includes face to face time and non-face to face time preparing to see the patient (eg, review of tests), Obtaining and/or reviewing separately obtained history, Documenting clinical information in the electronic or other health record, Independently interpreting results (not separately reported) and communicating results to the patient/family/caregiver, or Care coordination (not separately reported).     Each patient to whom he or she provides medical services by telemedicine is:  (1) informed of the relationship between the physician and patient and the respective role of any other health care provider with respect to management of the patient; and (2) notified that he or she may decline to receive medical services by telemedicine and may withdraw from such care at any time.    Telemedicine Notes:  Endocrine clinic note  09/10/2024:  29-year-old female scheduled today for endocrine clinic follow-up.  History of Graves disease, palpitations, thyroid nodules and tobacco use.  Graves disease pt is on MMI 2.5 mg. TSH 3.945, free T4 0.75, free T3 2.64 on 07/10/2024. Free T4 0.79 Free T3 3.03 on 02/28/2024. TPO >1.543, TSI 1.1, Trab 3.40 on 02/28/2024.  Patient was instructed to stop methimazole my stated she was scared to go hyperthyroid remain on methimazole 2.5 mg per day.  Patient denies weight gain.  Patient continues with palpitation previously thought to be anxiety related.  Tobacco abuse patient continues with slow a half to 1 pack per day.  Previously tried patches work was unable to quit.  Discussed with the patient for 4 minutes on smoking sensation and retry him methods once determined to quit.  Multinodular goiter patient was due for yearly follow-up ultrasound orders placed in the system no new palpable nodules are noted.                   Narrative & Impression  Thyroid ultrasound     INDICATION: 27-year-old woman with enlarged thyroid gland/nodules.     TECHNIQUE: Real-time grayscale and color Doppler sonographic  evaluation of the thyroid gland was performed per routine protocol.     COMPARISON: Thyroid ultrasound 6/28/2019.     FINDINGS:     There is mild enlargement at the right thyroid lobe with the gland  otherwise normal and size and contour. The right thyroid lobe measures  5.4 x 1.6 x 1.9 cm and the left lobe 4.5 x 1.6 x 1.4 cm. The isthmus  measures just under 0.2 cm in AP thickness. A small colloid cyst at  the right thyroid lobe measures 5 x 3 x 4 mm and a second small and  well marginated solid hypoechoic nodule at the right posterior thyroid  lobe 5 x 3 x 5 mm. A single small colloid cyst in the left thyroid  lobe measures 3 x 2 x 3 mm. There is mild diffuse heterogeneity of the  thyroid gland parenchyma along with diffuse hypervascularity at the  gland.     IMPRESSION:     Mild diffuse heterogeneity and  hypervascularity at the thyroid gland  suggesting diffuse thyroiditis with several small benign subcentimeter  nodules as detailed above. No significant interval change compared to  the thyroid ultrasound in 2019 aside from interval decrease in size of  the left thyroid nodule.  Electronically Signed By: Erasmo Pa MD  Date/Time Signed: 03/23/2022 16:27     Specimen Collected: 03/23/22 14:13 Last Resulted: 03/23/22 14:13                    Review of Systems   Constitutional:  Negative for activity change, appetite change and fatigue.   HENT:  Negative for dental problem, hearing loss, tinnitus, trouble swallowing and goiter.    Eyes:  Negative for photophobia, pain and visual disturbance.   Respiratory:  Negative for cough, chest tightness and wheezing.    Cardiovascular:  Negative for chest pain, palpitations and leg swelling.   Gastrointestinal:  Negative for abdominal pain, constipation, diarrhea, nausea and reflux.   Endocrine: Negative for cold intolerance, heat intolerance, polydipsia and polyphagia.   Genitourinary:  Negative for difficulty urinating, flank pain, hematuria, hot flashes, menstrual irregularity, menstrual problem, nocturia and urgency.   Musculoskeletal:  Negative for back pain, gait problem, joint swelling, leg pain and joint deformity.   Integumentary:  Negative for color change, pallor, rash and breast discharge.   Allergic/Immunologic: Negative for environmental allergies, food allergies and immunocompromised state.   Neurological:  Negative for tremors, seizures, headaches, memory loss and coordination difficulties.   Psychiatric/Behavioral:  Negative for agitation, behavioral problems and sleep disturbance. The patient is not nervous/anxious.           Objective     Physical Exam  Constitutional:       Appearance: Normal appearance.   HENT:      Head: Normocephalic.      Nose: Nose normal.   Eyes:      Conjunctiva/sclera: Conjunctivae normal.   Pulmonary:      Effort: Pulmonary  effort is normal.   Musculoskeletal:      Cervical back: Normal range of motion.   Neurological:      Mental Status: She is alert.   Psychiatric:         Mood and Affect: Mood normal.         Behavior: Behavior normal.         Thought Content: Thought content normal.         Judgment: Judgment normal.            Assessment and Plan     1. Graves disease  MMI stopped 2 months ago (pt still taking)    TSH 3.945, free T4 0.75, free T3 2.64 on 07/10/2024  Free T4 0.79 Free T3 3.03 on 02/28/2024  TPO >1.543, TSI 1.1, Trab 3.40 on 02/28/2024   Labs this week TSH, Free T4, Free T3              Component Ref Range & Units 2 mo ago  (7/10/24) 1 yr ago  (8/18/23) 1 yr ago  (7/24/23) 1 yr ago  (1/19/23) 2 yr ago  (7/19/22) 2 yr ago  (5/15/22) 2 yr ago  (1/25/22)   TSH 0.350 - 4.940 uIU/mL 3.945 1.926 4.623 2.769 2.0166 R 1.900 R 2.2292 R                   Component Ref Range & Units 2 mo ago  (7/10/24) 6 mo ago  (2/28/24) 1 yr ago  (7/24/23) 1 yr ago  (1/19/23) 2 yr ago  (7/19/22) 2 yr ago  (12/19/21) 2 yr ago  (9/30/21)   Thyroxine Free 0.70 - 1.48 ng/dL 0.75 0.79 0.79 1.00 0.91 0.91 1.00        Component Ref Range & Units 2 mo ago  (7/10/24) 6 mo ago  (2/28/24) 1 yr ago  (7/24/23) 1 yr ago  (1/19/23) 2 yr ago  (7/19/22) 3 yr ago  (3/22/21) 4 yr ago  (3/23/20)   T3 Free 1.58 - 3.91 pg/mL 2.64 3.03 2.87 3.05 R 2.98 R 3.02 R 2.61 R   -     T4, Free; Future; Expected date: 09/10/2024  -     T3, Free (OLG); Future; Expected date: 09/10/2024  -     TSH; Future; Expected date: 09/10/2024        -     US Thyroid; Future; Expected date: 09/11/2024    2. Palpitations  Continues w/ palpitations  No medication does time likely anxiety related    3. Multinodular goiter  Subcentimeter meter nodules ultrasound 07/24/2023    4. Tobacco use  Discussed with the patient's smoking cessation  Smoking 1/2-1 pack a day  Discussed cessation total 4 minutes         Follow up in about 3 months (around 12/10/2024) for Graves, Hypothyroidism.

## 2024-09-27 ENCOUNTER — PATIENT MESSAGE (OUTPATIENT)
Dept: GASTROENTEROLOGY | Facility: CLINIC | Age: 29
End: 2024-09-27
Payer: MEDICAID

## 2024-09-27 ENCOUNTER — PATIENT MESSAGE (OUTPATIENT)
Dept: INTERNAL MEDICINE | Facility: CLINIC | Age: 29
End: 2024-09-27
Payer: MEDICAID

## 2024-09-27 ENCOUNTER — HOSPITAL ENCOUNTER (EMERGENCY)
Facility: HOSPITAL | Age: 29
Discharge: HOME | End: 2024-09-28
Attending: INTERNAL MEDICINE
Payer: MEDICAID

## 2024-09-27 DIAGNOSIS — R11.2 NAUSEA AND VOMITING, UNSPECIFIED VOMITING TYPE: ICD-10-CM

## 2024-09-27 DIAGNOSIS — R10.84 GENERALIZED ABDOMINAL PAIN: Primary | ICD-10-CM

## 2024-09-27 LAB
ABS NEUT CALC (OHS): 3.86 X10(3)/MCL (ref 2.1–9.2)
ALBUMIN SERPL-MCNC: 4.1 G/DL (ref 3.5–5)
ALBUMIN/GLOB SERPL: 1.2 RATIO (ref 1.1–2)
ALP SERPL-CCNC: 53 UNIT/L (ref 40–150)
ALT SERPL-CCNC: 9 UNIT/L (ref 0–55)
ANION GAP SERPL CALC-SCNC: 8 MEQ/L
AST SERPL-CCNC: 12 UNIT/L (ref 5–34)
B-HCG UR QL: NEGATIVE
BACTERIA #/AREA URNS AUTO: ABNORMAL /HPF
BILIRUB SERPL-MCNC: 0.4 MG/DL
BILIRUB UR QL STRIP.AUTO: NEGATIVE
BUN SERPL-MCNC: 7.8 MG/DL (ref 7–18.7)
CALCIUM SERPL-MCNC: 9.2 MG/DL (ref 8.4–10.2)
CHLORIDE SERPL-SCNC: 108 MMOL/L (ref 98–107)
CLARITY UR: ABNORMAL
CO2 SERPL-SCNC: 24 MMOL/L (ref 22–29)
COLOR UR AUTO: YELLOW
CREAT SERPL-MCNC: 0.81 MG/DL (ref 0.55–1.02)
CREAT/UREA NIT SERPL: 10
CTP QC/QA: YES
EOSINOPHIL NFR BLD MANUAL: 0.21 X10(3)/MCL (ref 0–0.9)
EOSINOPHIL NFR BLD MANUAL: 3 % (ref 0–8)
ERYTHROCYTE [DISTWIDTH] IN BLOOD BY AUTOMATED COUNT: 14.6 % (ref 11.5–17)
GFR SERPLBLD CREATININE-BSD FMLA CKD-EPI: >60 ML/MIN/1.73/M2
GLOBULIN SER-MCNC: 3.3 GM/DL (ref 2.4–3.5)
GLUCOSE SERPL-MCNC: 83 MG/DL (ref 74–100)
GLUCOSE UR QL STRIP: NORMAL
HCT VFR BLD AUTO: 37.9 % (ref 37–47)
HGB BLD-MCNC: 12.3 G/DL (ref 12–16)
HGB UR QL STRIP: ABNORMAL
HYALINE CASTS #/AREA URNS LPF: ABNORMAL /LPF
KETONES UR QL STRIP: NEGATIVE
LEUKOCYTE ESTERASE UR QL STRIP: 25
LIPASE SERPL-CCNC: 15 U/L
LYMPHOCYTES NFR BLD MANUAL: 2.31 X10(3)/MCL
LYMPHOCYTES NFR BLD MANUAL: 33 % (ref 13–40)
MAGNESIUM SERPL-MCNC: 2.1 MG/DL (ref 1.6–2.6)
MCH RBC QN AUTO: 28.5 PG (ref 27–31)
MCHC RBC AUTO-ENTMCNC: 32.5 G/DL (ref 33–36)
MCV RBC AUTO: 87.7 FL (ref 80–94)
MONOCYTES NFR BLD MANUAL: 0.63 X10(3)/MCL (ref 0.1–1.3)
MONOCYTES NFR BLD MANUAL: 9 % (ref 2–11)
MUCOUS THREADS URNS QL MICRO: ABNORMAL /LPF
NEUTROPHILS NFR BLD MANUAL: 55 % (ref 47–80)
NITRITE UR QL STRIP: NEGATIVE
NRBC BLD AUTO-RTO: 0 %
PH UR STRIP: 5.5 [PH]
PLATELET # BLD AUTO: 203 X10(3)/MCL (ref 130–400)
PLATELET # BLD EST: ADEQUATE 10*3/UL
PMV BLD AUTO: 11.5 FL (ref 7.4–10.4)
POTASSIUM SERPL-SCNC: 3.2 MMOL/L (ref 3.5–5.1)
PROT SERPL-MCNC: 7.4 GM/DL (ref 6.4–8.3)
PROT UR QL STRIP: ABNORMAL
RBC # BLD AUTO: 4.32 X10(6)/MCL (ref 4.2–5.4)
RBC #/AREA URNS AUTO: ABNORMAL /HPF
RBC MORPH BLD: NORMAL
SODIUM SERPL-SCNC: 140 MMOL/L (ref 136–145)
SP GR UR STRIP.AUTO: 1.03 (ref 1–1.03)
SQUAMOUS #/AREA URNS LPF: ABNORMAL /HPF
UROBILINOGEN UR STRIP-ACNC: NORMAL
WBC # BLD AUTO: 7.01 X10(3)/MCL (ref 4.5–11.5)
WBC #/AREA URNS AUTO: ABNORMAL /HPF

## 2024-09-27 PROCEDURE — 96360 HYDRATION IV INFUSION INIT: CPT

## 2024-09-27 PROCEDURE — 63600175 PHARM REV CODE 636 W HCPCS: Performed by: PHYSICIAN ASSISTANT

## 2024-09-27 PROCEDURE — 80053 COMPREHEN METABOLIC PANEL: CPT | Performed by: PHYSICIAN ASSISTANT

## 2024-09-27 PROCEDURE — 83735 ASSAY OF MAGNESIUM: CPT | Performed by: PHYSICIAN ASSISTANT

## 2024-09-27 PROCEDURE — 81001 URINALYSIS AUTO W/SCOPE: CPT | Performed by: PHYSICIAN ASSISTANT

## 2024-09-27 PROCEDURE — 93005 ELECTROCARDIOGRAM TRACING: CPT

## 2024-09-27 PROCEDURE — 83690 ASSAY OF LIPASE: CPT | Performed by: PHYSICIAN ASSISTANT

## 2024-09-27 PROCEDURE — 81025 URINE PREGNANCY TEST: CPT | Performed by: PHYSICIAN ASSISTANT

## 2024-09-27 PROCEDURE — 96372 THER/PROPH/DIAG INJ SC/IM: CPT | Performed by: PHYSICIAN ASSISTANT

## 2024-09-27 PROCEDURE — 99285 EMERGENCY DEPT VISIT HI MDM: CPT | Mod: 25

## 2024-09-27 PROCEDURE — 85027 COMPLETE CBC AUTOMATED: CPT | Performed by: PHYSICIAN ASSISTANT

## 2024-09-27 RX ORDER — DICYCLOMINE HYDROCHLORIDE 10 MG/1
20 CAPSULE ORAL
Status: DISCONTINUED | OUTPATIENT
Start: 2024-09-27 | End: 2024-09-27

## 2024-09-27 RX ORDER — ONDANSETRON HYDROCHLORIDE 2 MG/ML
4 INJECTION, SOLUTION INTRAVENOUS
Status: DISCONTINUED | OUTPATIENT
Start: 2024-09-27 | End: 2024-09-27

## 2024-09-27 RX ORDER — KETOROLAC TROMETHAMINE 30 MG/ML
15 INJECTION, SOLUTION INTRAMUSCULAR; INTRAVENOUS
Status: COMPLETED | OUTPATIENT
Start: 2024-09-27 | End: 2024-09-27

## 2024-09-27 RX ORDER — ONDANSETRON 4 MG/1
4 TABLET, ORALLY DISINTEGRATING ORAL
Status: DISCONTINUED | OUTPATIENT
Start: 2024-09-27 | End: 2024-09-27

## 2024-09-27 RX ORDER — PROCHLORPERAZINE EDISYLATE 5 MG/ML
10 INJECTION INTRAMUSCULAR; INTRAVENOUS ONCE
Status: DISCONTINUED | OUTPATIENT
Start: 2024-09-27 | End: 2024-09-28 | Stop reason: HOSPADM

## 2024-09-27 RX ADMIN — SODIUM CHLORIDE, POTASSIUM CHLORIDE, SODIUM LACTATE AND CALCIUM CHLORIDE 1000 ML: 600; 310; 30; 20 INJECTION, SOLUTION INTRAVENOUS at 10:09

## 2024-09-27 RX ADMIN — IOHEXOL 100 ML: 350 INJECTION, SOLUTION INTRAVENOUS at 11:09

## 2024-09-27 RX ADMIN — KETOROLAC TROMETHAMINE 15 MG: 30 INJECTION, SOLUTION INTRAMUSCULAR; INTRAVENOUS at 11:09

## 2024-09-28 VITALS
BODY MASS INDEX: 22.33 KG/M2 | SYSTOLIC BLOOD PRESSURE: 106 MMHG | HEIGHT: 60 IN | HEART RATE: 77 BPM | TEMPERATURE: 99 F | WEIGHT: 113.75 LBS | DIASTOLIC BLOOD PRESSURE: 76 MMHG | OXYGEN SATURATION: 98 % | RESPIRATION RATE: 17 BRPM

## 2024-09-28 PROCEDURE — 25500020 PHARM REV CODE 255: Performed by: PHYSICIAN ASSISTANT

## 2024-09-28 RX ORDER — PROMETHAZINE HYDROCHLORIDE 25 MG/1
25 TABLET ORAL 3 TIMES DAILY PRN
Qty: 15 TABLET | Refills: 0 | Status: SHIPPED | OUTPATIENT
Start: 2024-09-28 | End: 2024-10-13

## 2024-09-28 RX ORDER — KETOROLAC TROMETHAMINE 10 MG/1
10 TABLET, FILM COATED ORAL 3 TIMES DAILY PRN
Qty: 15 TABLET | Refills: 0 | Status: SHIPPED | OUTPATIENT
Start: 2024-09-28

## 2024-09-28 NOTE — DISCHARGE INSTRUCTIONS
Take medication as prescribed as needed.  Follow up with your doctor within 2-3 days and follow up with gastroenterologist within a week.  Return if symptoms worsen.

## 2024-09-29 LAB
OHS QRS DURATION: 78 MS
OHS QTC CALCULATION: 445 MS

## 2024-09-30 NOTE — ED PROVIDER NOTES
"Encounter Date: 9/27/2024       History     Chief Complaint   Patient presents with    Abdominal Pain     Epigastric abdominal pain, states hx. Of "chronic" stomach problems, has been unable to follow up with GI. Nauseous     29-year-old female presents to the emergency department complaints of epigastric abdominal pain.  She states she has a history of chronic stomach issues and has been seen by multiple ERs and GI doctors without a definitive diagnosis.  She said she had not been able to eat anything today because she has been vomiting in the epigastric pain is worse rating the pain 7/10.  She denies dysuria, diarrhea, vaginal discharge, shortness of breath, chest pain, fever, chills.    The history is provided by the patient. No  was used.     Review of patient's allergies indicates:   Allergen Reactions    Benadryl [diphenhydramine hcl] Anxiety     Hallucinations     Diphenhydramine Anxiety and Other (See Comments)     Past Medical History:   Diagnosis Date    Anxiety disorder, unspecified     Depression     Essential fatty acid deficiency     Gastric motility disorder     Graves disease     History of ovarian cyst     frequent, reoccurring with hx of rupture and hemorrhage    Iron deficiency anemia, unspecified     Pelvic congestion syndrome     Poor dentition     POTS (postural orthostatic tachycardia syndrome)     Smoker     Vitamin D deficiency      Past Surgical History:   Procedure Laterality Date    CHOLECYSTECTOMY      DX-LAP, EVACUATION HEMOPERITONEUM  11/30/2021    GALLBLADDER SURGERY      LAPAROSCOPIC CHOLECYSTECTOMY N/A 10/10/2022    Procedure: CHOLECYSTECTOMY, LAPAROSCOPIC;  Surgeon: Tobin Aguiar MD;  Location: [facility];  Service: General;  Laterality: N/A;    TONSILLECTOMY  11/2011     Family History   Problem Relation Name Age of Onset    Heart disease Paternal Grandfather Dany Baeza II     Cancer Paternal Grandfather Dany Baeza II     Stroke Paternal Grandfather Dany " "Aryan II     Heart disease Paternal Grandmother Sophie Baeza     Pancreatic cancer Maternal Grandmother Yahaira "Casi" Nadira     Ovarian cancer Maternal Grandmother Yahaira "Casi" Nadira     Cancer Maternal Grandmother Yahaira "Casi" Nadira     Heart disease Father Dany Baeza III     Heart attack Father Dany Baeza III     Coronary artery disease Father Dany Baeza III     Cancer Mother Priscilla Janene         colorectal cancer    Bipolar disorder Mother Priscilla Janene     Irritable bowel syndrome Mother Prisiclla Janene     No Known Problems Brother      Kidney disease Sister      No Known Problems Son 2     Stroke Paternal Aunt Emma Baeza     Stroke Maternal Aunt Sara Warner      Social History     Tobacco Use    Smoking status: Every Day     Current packs/day: 1.50     Average packs/day: 1.3 packs/day for 30.7 years (39.1 ttl pk-yrs)     Types: Cigarettes     Start date: 1/1/2008     Passive exposure: Never    Smokeless tobacco: Never   Substance Use Topics    Alcohol use: Never    Drug use: Not Currently     Review of Systems   Constitutional:  Negative for chills and fever.   HENT: Negative.     Eyes: Negative.    Respiratory:  Negative for cough, chest tightness and shortness of breath.    Cardiovascular:  Negative for chest pain and palpitations.   Gastrointestinal:  Positive for abdominal pain, nausea and vomiting. Negative for diarrhea.   Genitourinary:  Negative for decreased urine volume, dysuria, hematuria and vaginal discharge.   Musculoskeletal:  Negative for back pain.   Skin:  Negative for rash and wound.   Neurological:  Negative for dizziness and numbness.   Hematological:  Negative for adenopathy.       Physical Exam     Initial Vitals [09/27/24 2053]   BP Pulse Resp Temp SpO2   110/79 106 17 98.6 °F (37 °C) 98 %      MAP       --         Physical Exam    Nursing note and vitals reviewed.  Constitutional: She appears well-developed and well-nourished.   HENT:   Head: Normocephalic and atraumatic. "   Cardiovascular:  Normal rate, regular rhythm, normal heart sounds and intact distal pulses.           Pulmonary/Chest: Breath sounds normal.   Abdominal: Abdomen is soft. Bowel sounds are normal. There is abdominal tenderness (Epigastric). There is no rebound and no guarding.   Musculoskeletal:         General: Normal range of motion.     Neurological: She is alert.   Skin: Skin is warm. Capillary refill takes less than 2 seconds.         ED Course   Procedures  Labs Reviewed   COMPREHENSIVE METABOLIC PANEL - Abnormal       Result Value    Sodium 140      Potassium 3.2 (*)     Chloride 108 (*)     CO2 24      Glucose 83      Blood Urea Nitrogen 7.8      Creatinine 0.81      Calcium 9.2      Protein Total 7.4      Albumin 4.1      Globulin 3.3      Albumin/Globulin Ratio 1.2      Bilirubin Total 0.4      ALP 53      ALT 9      AST 12      eGFR >60      Anion Gap 8.0      BUN/Creatinine Ratio 10     URINALYSIS, REFLEX TO URINE CULTURE - Abnormal    Color, UA Yellow      Appearance, UA Turbid (*)     Specific Gravity, UA 1.026      pH, UA 5.5      Protein, UA Trace (*)     Glucose, UA Normal      Ketones, UA Negative      Blood, UA 1+ (*)     Bilirubin, UA Negative      Urobilinogen, UA Normal      Nitrites, UA Negative      Leukocyte Esterase, UA 25 (*)     RBC, UA 0-5      WBC, UA 6-10 (*)     Bacteria, UA None Seen      Squamous Epithelial Cells, UA Many (*)     Mucous, UA Many (*)     Hyaline Casts, UA None Seen     CBC WITH DIFFERENTIAL - Abnormal    WBC 7.01      RBC 4.32      Hgb 12.3      Hct 37.9      MCV 87.7      MCH 28.5      MCHC 32.5 (*)     RDW 14.6      Platelet 203      MPV 11.5 (*)     NRBC% 0.0     LIPASE - Normal    Lipase Level 15     MANUAL DIFFERENTIAL - Normal    Neutrophils % 55      Lymphs % 33      Monocytes % 9      Eosinophils % 3      Neutrophils Abs Calc 3.8555      Lymphs Abs 2.3133      Eosinophils Abs 0.2103      Monocytes Abs 0.6309      Platelets Adequate      RBC Morph Normal      MAGNESIUM - Normal    Magnesium Level 2.10     CBC W/ AUTO DIFFERENTIAL    Narrative:     The following orders were created for panel order CBC Auto Differential.  Procedure                               Abnormality         Status                     ---------                               -----------         ------                     CBC with Differential[[id number]]       Abnormal            Final result               Manual Differential[[id number]]         Normal              Final result                 Please view results for these tests on the individual orders.   EXTRA TUBES    Narrative:     The following orders were created for panel order EXTRA TUBES.  Procedure                               Abnormality         Status                     ---------                               -----------         ------                     Gold Top Hold[[id number]]                                                                Please view results for these tests on the individual orders.   GOLD TOP HOLD   POCT URINE PREGNANCY    POC Preg Test, Ur Negative       Acceptable Yes          ECG Results              EKG 12-lead (Final result)        Collection Time Result Time QRS Duration OHS QTC Calculation    09/27/24 21:01:02 09/29/24 19:18:04 78 445                     Final result by Interface, Lab In [facility] (09/29/24 19:18:09)                   Narrative:    Test Reason : R07.9,    Vent. Rate : 102 BPM     Atrial Rate : 102 BPM     P-R Int : 134 ms          QRS Dur : 078 ms      QT Int : 342 ms       P-R-T Axes : 075 078 075 degrees     QTc Int : 445 ms    Sinus tachycardia  Otherwise normal ECG  Confirmed by Dmitriy Dye MD ([id number]) on 9/29/2024 7:18:02 PM    Referred By: [email or web address]   SELF           Confirmed By:Dmitriy Dye MD                                  Imaging Results              CT Abdomen Pelvis With IV Contrast NO Oral Contrast (Final result)  Result time 09/28/24 07:19:06      Final result by Erlin  Kashif TOBIAS MD (09/28/24 07:19:06)                   Impression:      1. No acute abnormality appreciated within the abdomen and pelvis.  2. [employer] concordance.      Electronically signed by: Kashif Kern MD  Date:    09/28/2024  Time:    07:19               Narrative:    EXAMINATION:  CT ABDOMEN PELVIS WITH IV CONTRAST    CLINICAL HISTORY:  Epigastric abdominal pain.    TECHNIQUE:  Axial CT images of the abdomen and pelvis were obtained with intravenous contrast.  Coronal and sagittal reformations were obtained. Automated exposure control was utilized. Total exam DLP is 239 mGy cm.    COMPARISON:  08/11/2023    FINDINGS:  The lung bases are clear.  The gallbladder surgically absent.  The liver, pancreas, spleen, and adrenal glands appear unremarkable.  The kidneys demonstrate no hydronephrosis or obstructing calculus.  There is no bowel obstruction.  The appendix appears within normal limits.  The abdominal aorta is not aneurysmal.  There is no intraperitoneal free air, free fluid, or lymphadenopathy identified. Urinary bladder appears unremarkable.  No acute displaced fractures identified.  There are chronic bilateral L5 pars defects with grade 1 anterolisthesis L5 on S1.                        Preliminary result by Fabricio Guevara MD (09/28/24 00:09:25)                   Impression:    1. No acute intraabdominal or pelvic solid organ or bowel pathology identified. Details and other findings as discussed above.               Narrative:    START OF REPORT:  Technique: CT of the abdomen and pelvis was performed with axial images as well as sagittal and coronal reconstruction images with intravenous contrast.    Comparison: None available.    Clinical History: Abdominal Pain (Epigastric abdominal pain, states hx. Of chronic stomach problems, has been unable to follow up with GI. Nauseous.    Dosage Information: Automated Exposure Control was utilized.    Findings:  Lines and Tubes: None.  Thorax:  Lungs: The  visualized lung bases appear unremarkable. No focal infiltrate or consolidation is seen.  Pleura: No effusions or thickening. No pneumothorax is seen.  Heart: The heart size is within normal limits.  Abdomen:  Abdominal Wall: No abdominal wall pathology is seen.  Liver: The liver appears unremarkable.  Biliary System: No intrahepatic or extrahepatic biliary duct dilatation is seen.  Gallbladder: Surgical clips are seen in the gallbladder fossa which may reflect prior cholecystectomy.  Pancreas: The pancreas appears unremarkable.  Spleen: The spleen appears unremarkable.  Adrenals: The adrenal glands appear unremarkable.  Kidneys: The kidneys appear unremarkable with no stones cysts masses or hydronephrosis.  Aorta: The abdominal aorta appears unremarkable.  IVC: Unremarkable.  Bowel:  Esophagus: The visualized esophagus appears unremarkable.  Stomach: The stomach appears unremarkable.  Duodenum: Unremarkable appearing duodenum.  Small Bowel: The small bowel appears unremarkable.  Colon: Nondistended.  Appendix: The appendix appears unremarkable and is seen on series 2 image 93 to 108.  Peritoneum: No intraperitoneal free air or ascites is seen.    Pelvis:  Bladder: The bladder appears unremarkable.  Female:  Uterus: The uterus appears unremarkable.  Ovaries: The ovaries appear unremarkable with probable bilateral physiologic cysts.    Bony structures:  Dorsal Spine: There is subtle multilevel spondylosis of the visualized dorsal spine. There are pars interarticularis defects at L5 bilaterally with grade I anterolisthesis of L5 over S1.  Bony Pelvis: The visualized bony structures of the pelvis appear unremarkable.                                         Medications   lactated ringers bolus 1,000 mL (0 mLs Intravenous Stopped 9/27/24 2308)   ketorolac injection 15 mg (15 mg Intramuscular Given 9/27/24 2309)   iohexoL (OMNIPAQUE 350) injection 100 mL (100 mLs Intravenous Given 9/27/24 2326)     Medical Decision  Making  29-year-old female presents to the emergency department complaints of epigastric abdominal pain.  She states she has a history of chronic stomach issues and has been seen by multiple ERs and GI doctors without a definitive diagnosis.  She said she had not been able to eat anything today because she has been vomiting in the epigastric pain is worse rating the pain 7/10.  She denies dysuria, diarrhea, vaginal discharge, shortness of breath, chest pain, fever, chills.    DDx:  Peptic ulcer, hiatal hernia, gastritis, GERD    CBC, CMP, lipase, urinalysis, magnesium unremarkable.  Patient declined nausea medication, stating she has been taking antiemetic medication and Bentyl all day without relief.  Toradol and LR given in the ED.   She requested prescription for ketorolac.  Toradol and promethazine sent to pharmacy.  CT abdomen and pelvis:. No acute abnormality appreciated within the abdomen and pelvis.    Amount and/or Complexity of Data Reviewed  Labs: ordered.  Radiology: ordered.    Risk  Prescription drug management.               ED Course as of 10/01/24 0120   Sat Sep 28, 2024   0048 The patient is resting comfortably and in no acute distress.  She states that her symptoms have improved after treatment in Emergency Department. I personally discussed her test results and treatment plan.  Gave strict ED precautions, discussed specific conditions for return to the emergency department and importance of follow up with her primary care provider.  Patient voices understanding and agrees to the plan discussed. All patients' questions have been answered at this time.   She has remained hemodynamically stable throughout entire stay in ED and is stable for discharge home.  [ER]      ED Course User Index  [ER] Shyanne Lynch PA                           Clinical Impression:  Final diagnoses:  [R10.84] Generalized abdominal pain (Primary)  [R11.2] Nausea and vomiting, unspecified vomiting type          ED  Disposition Condition    Discharge Stable          ED Prescriptions       Medication Sig Dispense Start Date End Date Auth. Provider    ketorolac (TORADOL) 10 mg tablet Take 1 tablet (10 mg total) by mouth 3 (three) times daily as needed for Pain. 15 tablet 9/28/2024 -- Shyanne Lynch PA    promethazine (PHENERGAN) 25 MG tablet Take 1 tablet (25 mg total) by mouth 3 (three) times daily as needed for Nausea. 15 tablet 9/28/2024 10/13/2024 Shyanne Lynch PA          Follow-up Information       Follow up With Specialties Details Why Contact Info    [facility] - Emergency Dept Emergency Medicine  As needed, If symptoms worsen [address] [facility] [id number] [phone]    Courtney Dhaliwal MD Internal Medicine   [address] [facility] [address]  [phone]               Shyanne Lynch PA  10/01/24 0120

## 2024-10-07 ENCOUNTER — TELEPHONE (OUTPATIENT)
Dept: ENDOCRINOLOGY | Facility: CLINIC | Age: 29
End: 2024-10-07
Payer: MEDICAID

## 2024-10-29 RX ORDER — METHIMAZOLE 5 MG/1
TABLET ORAL
Qty: 15 TABLET | Refills: 2 | Status: SHIPPED | OUTPATIENT
Start: 2024-10-29

## 2024-10-31 ENCOUNTER — OFFICE VISIT (OUTPATIENT)
Dept: GASTROENTEROLOGY | Facility: CLINIC | Age: 29
End: 2024-10-31
Payer: MEDICAID

## 2024-10-31 DIAGNOSIS — R10.13 EPIGASTRIC PAIN: Primary | ICD-10-CM

## 2024-10-31 PROCEDURE — 99214 OFFICE O/P EST MOD 30 MIN: CPT | Mod: 95,,, | Performed by: INTERNAL MEDICINE

## 2024-10-31 NOTE — PROGRESS NOTES
Subjective:       Patient ID: Royce Quigley is a 29 y.o. female.    Chief Complaint: Abd pain    Telemedicine encounter today to follow-up with aforementioned complaints.  She was previously seen by me in March with complaints of weight loss and abdominal pain.  Extensive workup had been performed outside the [facility] system was reviewed.  At that time the plan was to send for gastric emptying study as well as video capsule.  Emptying study was mildly delayed however not diagnostic for gastroparesis and I do not see that the video capsule they are formed.  Today patient reports    Ongoing issues.     Concerned about MALS. Had mesenteric doppler with increased velocities       Review of Systems   Gastrointestinal:  Positive for abdominal pain, nausea and vomiting.         The following portions of the patient's history were reviewed and updated as appropriate: allergies, current medications, past family history, past medical history, past social history, past surgical history and problem list.    Objective:      Physical Exam  Constitutional:       Appearance: She is well-developed.   HENT:      Head: Normocephalic and atraumatic.   Eyes:      Conjunctiva/sclera: Conjunctivae normal.   Pulmonary:      Effort: Pulmonary effort is normal. No respiratory distress.   Musculoskeletal:      Cervical back: Normal range of motion.   Neurological:      Mental Status: She is alert and oriented to person, place, and time.   Psychiatric:         Behavior: Behavior normal.         Thought Content: Thought content normal.         Judgment: Judgment normal.           Pertinent labs and imaging studies reviewed    Assessment:       1. Epigastric pain        Plan:       W/u for mals (breathing protocol angiogram)  angiogram  Refer to surgeon after study    The patient location is: [place]  The chief complaint leading to consultation is: abd pain    Visit type: audiovisual      30 minutes of total time spent on the encounter,  which includes face to face time and non-face to face time preparing to see the patient (eg, review of tests), Obtaining and/or reviewing separately obtained history, Documenting clinical information in the electronic or other health record, Independently interpreting results (not separately reported) and communicating results to the patient/family/caregiver, or Care coordination (not separately reported).         Each patient to whom he or she provides medical services by telemedicine is:  (1) informed of the relationship between the physician and patient and the respective role of any other health care provider with respect to management of the patient; and (2) notified that he or she may decline to receive medical services by telemedicine and may withdraw from such care at any time.    Notes:       (Portions of this note were dictated using voice recognition software and may contain dictation related errors in spelling/grammar/syntax not found on text review)

## 2024-11-01 ENCOUNTER — HOSPITAL ENCOUNTER (EMERGENCY)
Facility: HOSPITAL | Age: 29
Discharge: HOME | End: 2024-11-01
Attending: EMERGENCY MEDICINE
Payer: MEDICAID

## 2024-11-01 VITALS
TEMPERATURE: 99 F | SYSTOLIC BLOOD PRESSURE: 100 MMHG | DIASTOLIC BLOOD PRESSURE: 64 MMHG | BODY MASS INDEX: 22.07 KG/M2 | HEART RATE: 65 BPM | HEIGHT: 60 IN | RESPIRATION RATE: 18 BRPM | WEIGHT: 112.44 LBS | OXYGEN SATURATION: 100 %

## 2024-11-01 DIAGNOSIS — K08.9 DENTAL DISEASE: Primary | ICD-10-CM

## 2024-11-01 LAB
ALBUMIN SERPL-MCNC: 4 G/DL (ref 3.5–5)
ALBUMIN/GLOB SERPL: 1.3 RATIO (ref 1.1–2)
ALP SERPL-CCNC: 52 UNIT/L (ref 40–150)
ALT SERPL-CCNC: 5 UNIT/L (ref 0–55)
ANION GAP SERPL CALC-SCNC: 6 MEQ/L
AST SERPL-CCNC: 11 UNIT/L (ref 5–34)
B-HCG UR QL: NEGATIVE
BACTERIA #/AREA URNS AUTO: ABNORMAL /HPF
BASOPHILS # BLD AUTO: 0.05 X10(3)/MCL
BASOPHILS NFR BLD AUTO: 0.7 %
BILIRUB SERPL-MCNC: 0.2 MG/DL
BILIRUB UR QL STRIP.AUTO: NEGATIVE
BUN SERPL-MCNC: 6 MG/DL (ref 7–18.7)
CALCIUM SERPL-MCNC: 9.3 MG/DL (ref 8.4–10.2)
CHLORIDE SERPL-SCNC: 107 MMOL/L (ref 98–107)
CLARITY UR: ABNORMAL
CO2 SERPL-SCNC: 27 MMOL/L (ref 22–29)
COLOR UR AUTO: YELLOW
CREAT SERPL-MCNC: 0.78 MG/DL (ref 0.55–1.02)
CREAT/UREA NIT SERPL: 8
CTP QC/QA: YES
EOSINOPHIL # BLD AUTO: 0.27 X10(3)/MCL (ref 0–0.9)
EOSINOPHIL NFR BLD AUTO: 3.9 %
ERYTHROCYTE [DISTWIDTH] IN BLOOD BY AUTOMATED COUNT: 15 % (ref 11.5–17)
GFR SERPLBLD CREATININE-BSD FMLA CKD-EPI: >60 ML/MIN/1.73/M2
GLOBULIN SER-MCNC: 3.1 GM/DL (ref 2.4–3.5)
GLUCOSE SERPL-MCNC: 98 MG/DL (ref 74–100)
GLUCOSE UR QL STRIP: NORMAL
HCT VFR BLD AUTO: 35.9 % (ref 37–47)
HGB BLD-MCNC: 11.7 G/DL (ref 12–16)
HGB UR QL STRIP: NEGATIVE
HOLD SPECIMEN: NORMAL
HYALINE CASTS #/AREA URNS LPF: ABNORMAL /LPF
IMM GRANULOCYTES # BLD AUTO: 0.01 X10(3)/MCL (ref 0–0.04)
IMM GRANULOCYTES NFR BLD AUTO: 0.1 %
KETONES UR QL STRIP: NEGATIVE
LEUKOCYTE ESTERASE UR QL STRIP: 75
LYMPHOCYTES # BLD AUTO: 2.38 X10(3)/MCL (ref 0.6–4.6)
LYMPHOCYTES NFR BLD AUTO: 34.7 %
MCH RBC QN AUTO: 28.6 PG (ref 27–31)
MCHC RBC AUTO-ENTMCNC: 32.6 G/DL (ref 33–36)
MCV RBC AUTO: 87.8 FL (ref 80–94)
MONOCYTES # BLD AUTO: 0.64 X10(3)/MCL (ref 0.1–1.3)
MONOCYTES NFR BLD AUTO: 9.3 %
MUCOUS THREADS URNS QL MICRO: ABNORMAL /LPF
NEUTROPHILS # BLD AUTO: 3.5 X10(3)/MCL (ref 2.1–9.2)
NEUTROPHILS NFR BLD AUTO: 51.3 %
NITRITE UR QL STRIP: NEGATIVE
NRBC BLD AUTO-RTO: 0 %
PH UR STRIP: 7 [PH]
PLATELET # BLD AUTO: 221 X10(3)/MCL (ref 130–400)
PMV BLD AUTO: 12.1 FL (ref 7.4–10.4)
POTASSIUM SERPL-SCNC: 3.6 MMOL/L (ref 3.5–5.1)
PROT SERPL-MCNC: 7.1 GM/DL (ref 6.4–8.3)
PROT UR QL STRIP: ABNORMAL
RBC # BLD AUTO: 4.09 X10(6)/MCL (ref 4.2–5.4)
RBC #/AREA URNS AUTO: ABNORMAL /HPF
SODIUM SERPL-SCNC: 140 MMOL/L (ref 136–145)
SP GR UR STRIP.AUTO: 1.02 (ref 1–1.03)
SQUAMOUS #/AREA URNS LPF: ABNORMAL /HPF
TSH SERPL-ACNC: 3.13 UIU/ML (ref 0.35–4.94)
UROBILINOGEN UR STRIP-ACNC: NORMAL
WBC # BLD AUTO: 6.85 X10(3)/MCL (ref 4.5–11.5)
WBC #/AREA URNS AUTO: ABNORMAL /HPF

## 2024-11-01 PROCEDURE — 80053 COMPREHEN METABOLIC PANEL: CPT | Performed by: NURSE PRACTITIONER

## 2024-11-01 PROCEDURE — 96372 THER/PROPH/DIAG INJ SC/IM: CPT | Performed by: PHYSICIAN ASSISTANT

## 2024-11-01 PROCEDURE — 81001 URINALYSIS AUTO W/SCOPE: CPT | Performed by: NURSE PRACTITIONER

## 2024-11-01 PROCEDURE — 63600175 PHARM REV CODE 636 W HCPCS: Performed by: PHYSICIAN ASSISTANT

## 2024-11-01 PROCEDURE — 84443 ASSAY THYROID STIM HORMONE: CPT | Performed by: NURSE PRACTITIONER

## 2024-11-01 PROCEDURE — 85025 COMPLETE CBC W/AUTO DIFF WBC: CPT | Performed by: NURSE PRACTITIONER

## 2024-11-01 PROCEDURE — 99285 EMERGENCY DEPT VISIT HI MDM: CPT | Mod: 25

## 2024-11-01 PROCEDURE — 81025 URINE PREGNANCY TEST: CPT | Performed by: NURSE PRACTITIONER

## 2024-11-01 RX ORDER — AMOXICILLIN AND CLAVULANATE POTASSIUM 875; 125 MG/1; MG/1
1 TABLET, FILM COATED ORAL EVERY 12 HOURS
Qty: 14 TABLET | Refills: 0 | Status: SHIPPED | OUTPATIENT
Start: 2024-11-01 | End: 2024-11-08

## 2024-11-01 RX ORDER — DEXAMETHASONE SODIUM PHOSPHATE 4 MG/ML
8 INJECTION, SOLUTION INTRA-ARTICULAR; INTRALESIONAL; INTRAMUSCULAR; INTRAVENOUS; SOFT TISSUE
Status: COMPLETED | OUTPATIENT
Start: 2024-11-01 | End: 2024-11-01

## 2024-11-01 RX ADMIN — DEXAMETHASONE SODIUM PHOSPHATE 8 MG: 4 INJECTION, SOLUTION INTRA-ARTICULAR; INTRALESIONAL; INTRAMUSCULAR; INTRAVENOUS; SOFT TISSUE at 08:11

## 2024-11-01 NOTE — ED PROVIDER NOTES
Encounter Date: 11/1/2024       History     Chief Complaint   Patient presents with    facial pressure     Facial pressure, lump on nose, and dizziness since yesterday. Rates pain 5/10. Also states pain to left eye. Vss. nadn     The patient presents with multiple complaints including: facial pain especially left upper cheek, lump/pain at upper ridge of nose, dizziness. These are chronic issues increased recently. PHMx of anxiety, depression, gastric motility disorder, graves disease, ovarian cyst, anemia, pelvic congestion syndrome, chronic poor dentition, POTS, spinal radiculopathy, smoker. She is under care of ENT clinic - last appointment 6/19, missed last 2 appointments, GI clinic - last appointment yesterday, endocrine clinic - last appointment 9/10, neurosurgery clinic - last appointment 7/10. She denies shortness of breath, chest pain, abdominal pain, nausea, vomiting, dysuria, fever, and chills.    The history is provided by the patient.     Review of patient's allergies indicates:   Allergen Reactions    Benadryl [diphenhydramine hcl] Anxiety     Hallucinations     Diphenhydramine Anxiety and Other (See Comments)     Past Medical History:   Diagnosis Date    Anxiety disorder, unspecified     Depression     Essential fatty acid deficiency     Gastric motility disorder     Graves disease     History of ovarian cyst     frequent, reoccurring with hx of rupture and hemorrhage    Iron deficiency anemia, unspecified     Pelvic congestion syndrome     Poor dentition     POTS (postural orthostatic tachycardia syndrome)     Smoker     Vitamin D deficiency      Past Surgical History:   Procedure Laterality Date    CHOLECYSTECTOMY      DX-LAP, EVACUATION HEMOPERITONEUM  11/30/2021    GALLBLADDER SURGERY      LAPAROSCOPIC CHOLECYSTECTOMY N/A 10/10/2022    Procedure: CHOLECYSTECTOMY, LAPAROSCOPIC;  Surgeon: Tobin Aguiar MD;  Location: [facility];  Service: General;  Laterality: N/A;    TONSILLECTOMY  11/2011  "    Family History   Problem Relation Name Age of Onset    Heart disease Paternal Grandfather Dany Baeza II     Cancer Paternal Grandfather Dany Baeza II     Stroke Paternal Grandfather Dany Baeza II     Heart disease Paternal Grandmother Sophie Baeza     Pancreatic cancer Maternal Grandmother Yahaira "Casi" Nadira     Ovarian cancer Maternal Grandmother Yahaira "Casi" Nadira     Cancer Maternal Grandmother Yahaira "Casi" Nadira     Heart disease Father Dany Baeza III     Heart attack Father Dany Baeza III     Coronary artery disease Father Dany Baeza III     Cancer Mother Priscilla Janene         colorectal cancer    Bipolar disorder Mother Priscilla Janene     Irritable bowel syndrome Mother Priscilla Janene     No Known Problems Brother      Kidney disease Sister      No Known Problems Son 2     Stroke Paternal Aunt Emma Baeza     Stroke Maternal Aunt Sara Warner      Social History     Tobacco Use    Smoking status: Every Day     Current packs/day: 1.50     Average packs/day: 1.3 packs/day for 30.8 years (39.3 ttl pk-yrs)     Types: Cigarettes     Start date: 1/1/2008     Passive exposure: Never    Smokeless tobacco: Never   Substance Use Topics    Alcohol use: Never    Drug use: Not Currently     Review of Systems   Constitutional:  Positive for fatigue. Negative for fever.   HENT:  Positive for sinus pressure and sinus pain. Negative for sore throat.    Respiratory:  Negative for shortness of breath.    Cardiovascular:  Negative for chest pain.   Gastrointestinal:  Negative for nausea.   Genitourinary:  Negative for dysuria.   Musculoskeletal:  Negative for back pain.   Skin:  Negative for rash.   Neurological:  Positive for dizziness. Negative for weakness.   Hematological:  Does not bruise/bleed easily.   All other systems reviewed and are negative.      Physical Exam     Initial Vitals [11/01/24 1807]   BP Pulse Resp Temp SpO2   106/72 106 18 98.5 °F (36.9 °C) 99 %      MAP       --         Physical " Exam    Nursing note and vitals reviewed.  Constitutional: She appears well-developed and well-nourished.   HENT:   Head: Normocephalic and atraumatic.   Right Ear: Tympanic membrane normal.   Left Ear: Tympanic membrane normal.   Nose: Nose normal. Mouth/Throat: Uvula is midline, oropharynx is clear and moist and mucous membranes are normal. Abnormal dentition. Dental caries present.   Widespread dental decay without evidence of dental infection or dental abscess   Neck: Neck supple.   Normal range of motion.  Cardiovascular:  Normal rate, regular rhythm, normal heart sounds and intact distal pulses.           Pulmonary/Chest: Effort normal and breath sounds normal. She has no decreased breath sounds.   Abdominal: Abdomen is soft and flat. Bowel sounds are normal. There is no abdominal tenderness.   Musculoskeletal:         General: Normal range of motion.      Cervical back: Normal range of motion and neck supple.     Neurological: She is alert. She has normal strength. No cranial nerve deficit.   Skin: Skin is warm and dry.   Psychiatric: She has a normal mood and affect.         ED Course   Procedures  Labs Reviewed   COMPREHENSIVE METABOLIC PANEL - Abnormal       Result Value    Sodium 140      Potassium 3.6      Chloride 107      CO2 27      Glucose 98      Blood Urea Nitrogen 6.0 (*)     Creatinine 0.78      Calcium 9.3      Protein Total 7.1      Albumin 4.0      Globulin 3.1      Albumin/Globulin Ratio 1.3      Bilirubin Total 0.2      ALP 52      ALT 5      AST 11      eGFR >60      Anion Gap 6.0      BUN/Creatinine Ratio 8     URINALYSIS, REFLEX TO URINE CULTURE - Abnormal    Color, UA Yellow      Appearance, UA Extra Turbid (*)     Specific Gravity, UA 1.021      pH, UA 7.0      Protein, UA 1+ (*)     Glucose, UA Normal      Ketones, UA Negative      Blood, UA Negative      Bilirubin, UA Negative      Urobilinogen, UA Normal      Nitrites, UA Negative      Leukocyte Esterase, UA 75 (*)     RBC, UA 0-5       WBC, UA 0-5      Bacteria, UA Trace (*)     Squamous Epithelial Cells, UA Many (*)     Mucous, UA Many (*)     Hyaline Casts, UA None Seen     CBC WITH DIFFERENTIAL - Abnormal    WBC 6.85      RBC 4.09 (*)     Hgb 11.7 (*)     Hct 35.9 (*)     MCV 87.8      MCH 28.6      MCHC 32.6 (*)     RDW 15.0      Platelet 221      MPV 12.1 (*)     Neut % 51.3      Lymph % 34.7      Mono % 9.3      Eos % 3.9      Basophil % 0.7      Lymph # 2.38      Neut # 3.50      Mono # 0.64      Eos # 0.27      Baso # 0.05      IG# 0.01      IG% 0.1      NRBC% 0.0     TSH - Normal    TSH 3.126     CBC W/ AUTO DIFFERENTIAL    Narrative:     The following orders were created for panel order CBC auto differential.  Procedure                               Abnormality         Status                     ---------                               -----------         ------                     CBC with Differential[[id number]]       Abnormal            Final result                 Please view results for these tests on the individual orders.   EXTRA TUBES    Narrative:     The following orders were created for panel order EXTRA TUBES.  Procedure                               Abnormality         Status                     ---------                               -----------         ------                     Red Top Hold[[id number]]                                    Final result                 Please view results for these tests on the individual orders.   RED TOP HOLD    Extra Tube Hold for add-ons.     POCT URINE PREGNANCY    POC Preg Test, Ur Negative       Acceptable Yes            Imaging Results              CT Sinuses without Contrast (Final result)  Result time 11/01/24 19:27:50      Final result by Boris Montes MD (11/01/24 19:27:50)                   Narrative:    EXAMINATION  CT SINUSES WITHOUT CONTRAST    CLINICAL HISTORY  facial pain, chronic sinusitis;    TECHNIQUE  Non-contrast helical-acquisition CT images of the  sinuses were obtained and multiplanar reconstructions accomplished by a CT technologist at a separate workstation, pushed to PACS for physician review.    COMPARISON  None available at the time of initial interpretation.    FINDINGS  Images were reviewed in soft tissue and bone windows.    Exam quality: Overall, adequate for evaluation.  However, absence of intravenous contrast mildly limits regional soft tissue and vascular assessment.    Maxillofacial structures: Paranasal sinuses and nasal passageways are predominantly clear. There is no air-fluid level or significant mucosal thickening.  The ostiomeatal units are patent.  Nasal turbinates and septum are unremarkable.  No evidence of acute bony disruption or aggressive lesion.  Incidentally visualized widespread carious dental disease.    Orbits: Orbital walls are intact.  The globes are unremarkable.  Optic nerves are grossly normal contour and symmetric caliber by CT appearance.  The intra-/extraconal fat is of homogeneous attenuation without focal abnormality.    Musculature: Bilateral extraocular muscles are unremarkable.  The remaining visualized muscular structures are without acute or focal abnormality.    Other findings: Regional superficial tissues are unremarkable.  No acute or suspicious focal intracranial finding.  Bilateral mastoid air cells are clear.    IMPRESSION  1. No CT findings suggestive of significant sinus inflammatory changes or acute maxillofacial process.  2. Incidental findings of widespread carious dental disease.    RADIATION DOSE  Automated tube current modulation, weight-based exposure dosing, and/or iterative reconstruction technique utilized to reach lowest reasonably achievable exposure rate.    DLP: 182 mGy*cm      Electronically signed by: Boris Montes  Date:    11/01/2024  Time:    19:27                                     Medications   dexAMETHasone injection 8 mg (has no administration in time range)     Medical Decision  Making  The patient presents with multiple complaints including: facial pain especially left upper cheek, lump/pain at upper ridge of nose, dizziness. These are chronic issues increased recently. PHMx of anxiety, depression, gastric motility disorder, graves disease, ovarian cyst, anemia, pelvic congestion syndrome, chronic poor dentition, POTS, spinal radiculopathy, smoker. She is under care of ENT clinic - last appointment 6/19, missed last 2 appointments, GI clinic - last appointment yesterday, endocrine clinic - last appointment 9/10, neurosurgery clinic - last appointment 7/10. She denies shortness of breath, chest pain, abdominal pain, nausea, vomiting, dysuria, fever, and chills.    Care transitioned to [facility] at 1900.    CBC, CMP, urinalysis and TSH unremarkable.  CT sinuses without contrast show widespread carious dental disease otherwise unremarkable.  Decadron IM given in the ED.  Prescribed Augmentin for dental decay possible early abscess.    Amount and/or Complexity of Data Reviewed  Labs: ordered. Decision-making details documented in ED Course.  Radiology: ordered. Decision-making details documented in ED Course.    Risk  Prescription drug management.      Additional MDM:   Differential Diagnosis:   At this time differential diagnosis is but not limited to dizziness, chronic sinusitis, dental infection, thyroid dysfunction              ED Course as of 11/01/24 2032 Fri Nov 01, 2024   1859 Bacteria, UA(!): Trace [ER]   1900 Leukocyte Esterase, UA(!): 75 [ER]   1935 CT Sinuses without Contrast  1. No CT findings suggestive of significant sinus inflammatory changes or acute maxillofacial process.  2. Incidental findings of widespread carious dental disease.   [ER]   2000 TSH: 3.126 [ER]   2031 The patient is resting comfortably and in no acute distress. I personally discussed her test results and treatment plan.  Gave strict ED precautions, discussed specific conditions for return to the emergency  department and importance of follow up with her primary care provider.  Patient voices understanding and agrees to the plan discussed. All patients' questions have been answered at this time.   She has remained hemodynamically stable throughout entire stay in ED and is stable for discharge home.  [ER]      ED Course User Index  [ER] Shyanne Lynch PA                           Clinical Impression:  Final diagnoses:  [K08.9] Dental disease (Primary)          ED Disposition Condition    Discharge Stable          ED Prescriptions       Medication Sig Dispense Start Date End Date Auth. Provider    amoxicillin-clavulanate 875-125mg (AUGMENTIN) 875-125 mg per tablet Take 1 tablet by mouth every 12 (twelve) hours. for 7 days 14 tablet 11/1/2024 11/8/2024 Shyanne Lynch PA          Follow-up Information       Follow up With Specialties Details Why Contact Info [facility] Emergency Medicine  As needed, If symptoms worsen [address] [facility] [id number] [phone]    Courtney Dhaliwal MD Internal Medicine Schedule an appointment as soon as possible for a visit in 2 days  [address] [facility] [address]  [phone]      Dentist of choice  Schedule an appointment as soon as possible for a visit                Shyanne Lynch PA  11/01/24 2032

## 2024-11-02 NOTE — DISCHARGE INSTRUCTIONS
Follow up with your doctor within 2-3 days return if symptoms worsen.  Follow up with dentist within 1 week.

## 2024-12-09 ENCOUNTER — HOSPITAL ENCOUNTER (EMERGENCY)
Facility: HOSPITAL | Age: 29
Discharge: HOME | End: 2024-12-09
Attending: FAMILY MEDICINE
Payer: MEDICAID

## 2024-12-09 VITALS
HEART RATE: 65 BPM | BODY MASS INDEX: 22.16 KG/M2 | SYSTOLIC BLOOD PRESSURE: 91 MMHG | OXYGEN SATURATION: 100 % | DIASTOLIC BLOOD PRESSURE: 65 MMHG | TEMPERATURE: 98 F | RESPIRATION RATE: 15 BRPM | HEIGHT: 60 IN | WEIGHT: 112.88 LBS

## 2024-12-09 DIAGNOSIS — R00.0 TACHYCARDIA: ICD-10-CM

## 2024-12-09 DIAGNOSIS — R07.9 CHEST PAIN, UNSPECIFIED TYPE: Primary | ICD-10-CM

## 2024-12-09 DIAGNOSIS — R07.9 CHEST PAIN: ICD-10-CM

## 2024-12-09 LAB
ALBUMIN SERPL-MCNC: 4.7 G/DL (ref 3.5–5)
ALBUMIN/GLOB SERPL: 1.3 RATIO (ref 1.1–2)
ALP SERPL-CCNC: 66 UNIT/L (ref 40–150)
ALT SERPL-CCNC: 19 UNIT/L (ref 0–55)
ANION GAP SERPL CALC-SCNC: 11 MEQ/L
AST SERPL-CCNC: 17 UNIT/L (ref 5–34)
BASOPHILS # BLD AUTO: 0.05 X10(3)/MCL
BASOPHILS NFR BLD AUTO: 0.7 %
BILIRUB SERPL-MCNC: 0.5 MG/DL
BUN SERPL-MCNC: 6.1 MG/DL (ref 7–18.7)
CALCIUM SERPL-MCNC: 9.9 MG/DL (ref 8.4–10.2)
CHLORIDE SERPL-SCNC: 103 MMOL/L (ref 98–107)
CO2 SERPL-SCNC: 26 MMOL/L (ref 22–29)
CREAT SERPL-MCNC: 0.82 MG/DL (ref 0.55–1.02)
CREAT/UREA NIT SERPL: 7
D DIMER PPP IA.FEU-MCNC: 0.31 UG/ML FEU (ref 0–0.5)
EOSINOPHIL # BLD AUTO: 0.16 X10(3)/MCL (ref 0–0.9)
EOSINOPHIL NFR BLD AUTO: 2.3 %
ERYTHROCYTE [DISTWIDTH] IN BLOOD BY AUTOMATED COUNT: 14.7 % (ref 11.5–17)
GFR SERPLBLD CREATININE-BSD FMLA CKD-EPI: >60 ML/MIN/1.73/M2
GLOBULIN SER-MCNC: 3.6 GM/DL (ref 2.4–3.5)
GLUCOSE SERPL-MCNC: 120 MG/DL (ref 74–100)
HCT VFR BLD AUTO: 41.1 % (ref 37–47)
HGB BLD-MCNC: 13.6 G/DL (ref 12–16)
HOLD SPECIMEN: NORMAL
IMM GRANULOCYTES # BLD AUTO: 0.01 X10(3)/MCL (ref 0–0.04)
IMM GRANULOCYTES NFR BLD AUTO: 0.1 %
LIPASE SERPL-CCNC: 16 U/L
LYMPHOCYTES # BLD AUTO: 2.45 X10(3)/MCL (ref 0.6–4.6)
LYMPHOCYTES NFR BLD AUTO: 35.1 %
MAGNESIUM SERPL-MCNC: 2.1 MG/DL (ref 1.6–2.6)
MCH RBC QN AUTO: 28.8 PG (ref 27–31)
MCHC RBC AUTO-ENTMCNC: 33.1 G/DL (ref 33–36)
MCV RBC AUTO: 87.1 FL (ref 80–94)
MONOCYTES # BLD AUTO: 0.47 X10(3)/MCL (ref 0.1–1.3)
MONOCYTES NFR BLD AUTO: 6.7 %
NEUTROPHILS # BLD AUTO: 3.84 X10(3)/MCL (ref 2.1–9.2)
NEUTROPHILS NFR BLD AUTO: 55.1 %
NRBC BLD AUTO-RTO: 0 %
PLATELET # BLD AUTO: 264 X10(3)/MCL (ref 130–400)
PMV BLD AUTO: 10.9 FL (ref 7.4–10.4)
POTASSIUM SERPL-SCNC: 3.2 MMOL/L (ref 3.5–5.1)
PROT SERPL-MCNC: 8.3 GM/DL (ref 6.4–8.3)
RBC # BLD AUTO: 4.72 X10(6)/MCL (ref 4.2–5.4)
SODIUM SERPL-SCNC: 140 MMOL/L (ref 136–145)
T3FREE SERPL-MCNC: 3.11 PG/ML (ref 1.58–3.91)
T4 FREE SERPL-MCNC: 0.93 NG/DL (ref 0.7–1.48)
TROPONIN I SERPL-MCNC: <0.01 NG/ML (ref 0–0.04)
TSH SERPL-ACNC: 4.44 UIU/ML (ref 0.35–4.94)
WBC # BLD AUTO: 6.98 X10(3)/MCL (ref 4.5–11.5)

## 2024-12-09 PROCEDURE — 25000003 PHARM REV CODE 250: Performed by: NURSE PRACTITIONER

## 2024-12-09 PROCEDURE — 80053 COMPREHEN METABOLIC PANEL: CPT | Performed by: NURSE PRACTITIONER

## 2024-12-09 PROCEDURE — 84439 ASSAY OF FREE THYROXINE: CPT | Performed by: NURSE PRACTITIONER

## 2024-12-09 PROCEDURE — 84484 ASSAY OF TROPONIN QUANT: CPT | Performed by: NURSE PRACTITIONER

## 2024-12-09 PROCEDURE — 83735 ASSAY OF MAGNESIUM: CPT | Performed by: NURSE PRACTITIONER

## 2024-12-09 PROCEDURE — 25000003 PHARM REV CODE 250: Performed by: PHYSICIAN ASSISTANT

## 2024-12-09 PROCEDURE — 93005 ELECTROCARDIOGRAM TRACING: CPT

## 2024-12-09 PROCEDURE — 25500020 PHARM REV CODE 255

## 2024-12-09 PROCEDURE — 85379 FIBRIN DEGRADATION QUANT: CPT | Performed by: NURSE PRACTITIONER

## 2024-12-09 PROCEDURE — 83690 ASSAY OF LIPASE: CPT | Performed by: NURSE PRACTITIONER

## 2024-12-09 PROCEDURE — 85025 COMPLETE CBC W/AUTO DIFF WBC: CPT | Performed by: NURSE PRACTITIONER

## 2024-12-09 PROCEDURE — 84481 FREE ASSAY (FT-3): CPT | Performed by: NURSE PRACTITIONER

## 2024-12-09 PROCEDURE — 99285 EMERGENCY DEPT VISIT HI MDM: CPT | Mod: 25

## 2024-12-09 PROCEDURE — 84443 ASSAY THYROID STIM HORMONE: CPT | Performed by: NURSE PRACTITIONER

## 2024-12-09 RX ORDER — KETOROLAC TROMETHAMINE 10 MG/1
10 TABLET, FILM COATED ORAL
Status: COMPLETED | OUTPATIENT
Start: 2024-12-09 | End: 2024-12-09

## 2024-12-09 RX ORDER — LIDOCAINE HYDROCHLORIDE 20 MG/ML
5 SOLUTION OROPHARYNGEAL
Status: COMPLETED | OUTPATIENT
Start: 2024-12-09 | End: 2024-12-09

## 2024-12-09 RX ADMIN — IOHEXOL 85 ML: 350 INJECTION, SOLUTION INTRAVENOUS at 08:12

## 2024-12-09 RX ADMIN — LIDOCAINE HYDROCHLORIDE 5 ML: 20 SOLUTION ORAL at 07:12

## 2024-12-09 RX ADMIN — ALUMINUM HYDROXIDE AND MAGNESIUM HYDROXIDE 30 ML: 200; 200 SUSPENSION ORAL at 07:12

## 2024-12-09 RX ADMIN — KETOROLAC TROMETHAMINE 10 MG: 10 TABLET, FILM COATED ORAL at 07:12

## 2024-12-09 NOTE — ED PROVIDER NOTES
"Encounter Date: 12/9/2024       History     Chief Complaint   Patient presents with    Chest Pain     Pt reports chest pain starting 3 days ago, "feels like sqeezing", radiates to left arm, SOB.      29 year old female presents with complaints of CP and SOB. She describes it as a squeezing feeling midchest. She associates it with palpitations. She has a history of anxiety, depression, gastric motility disorder, graves disease, ovarian cyst, anemia, pelvic congestion syndrome, chronic poor dentition, POTS, spinal radiculopathy, smoker.She is currently being worked up for MALS by GI.     The history is provided by the patient. No  was used.     Review of patient's allergies indicates:   Allergen Reactions    Benadryl [diphenhydramine hcl] Anxiety     Hallucinations     Diphenhydramine Anxiety and Other (See Comments)     Past Medical History:   Diagnosis Date    Anxiety disorder, unspecified     Depression     Essential fatty acid deficiency     Gastric motility disorder     Graves disease     History of ovarian cyst     frequent, reoccurring with hx of rupture and hemorrhage    Iron deficiency anemia, unspecified     Pelvic congestion syndrome     Poor dentition     POTS (postural orthostatic tachycardia syndrome)     Smoker     Vitamin D deficiency      Past Surgical History:   Procedure Laterality Date    CHOLECYSTECTOMY      DX-LAP, EVACUATION HEMOPERITONEUM  11/30/2021    GALLBLADDER SURGERY      LAPAROSCOPIC CHOLECYSTECTOMY N/A 10/10/2022    Procedure: CHOLECYSTECTOMY, LAPAROSCOPIC;  Surgeon: Tobin Aguiar MD;  Location: [facility];  Service: General;  Laterality: N/A;    TONSILLECTOMY  11/2011     Family History   Problem Relation Name Age of Onset    Heart disease Paternal Grandfather Dany Aryan II     Cancer Paternal Grandfather Dany Aryan II     Stroke Paternal Grandfather Dany Baeza II     Heart disease Paternal Grandmother Sophie Baeza     Pancreatic cancer Maternal Grandmother " "Yahaira "Casi" Nadira     Ovarian cancer Maternal Grandmother Yahaira "Casi" Nadira     Cancer Maternal Grandmother Yahaira "Casi" Nadira     Heart disease Father Dany Baeza III     Heart attack Father Dany Baeza III     Coronary artery disease Father Dany Baeza III     Cancer Mother Priscilla Janene         colorectal cancer    Bipolar disorder Mother Priscilla Janene     Irritable bowel syndrome Mother Priscilla Janene     No Known Problems Brother      Kidney disease Sister      No Known Problems Son 2     Stroke Paternal Aunt Emma Baeza     Stroke Maternal Aunt Sara Warner      Social History     Tobacco Use    Smoking status: Every Day     Current packs/day: 1.50     Average packs/day: 1.3 packs/day for 30.9 years (39.4 ttl pk-yrs)     Types: Cigarettes     Start date: 1/1/2008     Passive exposure: Never    Smokeless tobacco: Never   Substance Use Topics    Alcohol use: Never    Drug use: Not Currently     Review of Systems   Constitutional:  Negative for fever.   HENT:  Negative for sore throat.    Respiratory:  Positive for shortness of breath.    Cardiovascular:  Positive for chest pain.   Gastrointestinal:  Negative for nausea.   Genitourinary:  Negative for dysuria.   Musculoskeletal:  Negative for back pain.   Skin:  Negative for rash.   Neurological:  Negative for weakness.   Hematological:  Does not bruise/bleed easily.       Physical Exam     Initial Vitals [12/09/24 1738]   BP Pulse Resp Temp SpO2   (!) 93/59 (!) 116 18 97.8 °F (36.6 °C) 97 %      MAP       --         Physical Exam    Nursing note and vitals reviewed.  Constitutional: She appears well-developed and well-nourished.   HENT:   Head: Normocephalic and atraumatic.   Eyes: Conjunctivae and EOM are normal. Pupils are equal, round, and reactive to light.   Neck: Neck supple.   Normal range of motion.  Cardiovascular:  Normal rate and regular rhythm.           Pulmonary/Chest: Breath sounds normal. No respiratory distress.   Abdominal: Abdomen is " soft. Bowel sounds are normal. She exhibits no distension. There is no abdominal tenderness.   Musculoskeletal:         General: No edema. Normal range of motion.      Cervical back: Normal range of motion and neck supple.     Neurological: She is alert and oriented to person, place, and time. She has normal strength.   Skin: Skin is warm and dry.   Psychiatric: Thought content normal.         ED Course   Procedures  Labs Reviewed   COMPREHENSIVE METABOLIC PANEL - Abnormal       Result Value    Sodium 140      Potassium 3.2 (*)     Chloride 103      CO2 26      Glucose 120 (*)     Blood Urea Nitrogen 6.1 (*)     Creatinine 0.82      Calcium 9.9      Protein Total 8.3      Albumin 4.7      Globulin 3.6 (*)     Albumin/Globulin Ratio 1.3      Bilirubin Total 0.5      ALP 66      ALT 19      AST 17      eGFR >60      Anion Gap 11.0      BUN/Creatinine Ratio 7     CBC WITH DIFFERENTIAL - Abnormal    WBC 6.98      RBC 4.72      Hgb 13.6      Hct 41.1      MCV 87.1      MCH 28.8      MCHC 33.1      RDW 14.7      Platelet 264      MPV 10.9 (*)     Neut % 55.1      Lymph % 35.1      Mono % 6.7      Eos % 2.3      Basophil % 0.7      Lymph # 2.45      Neut # 3.84      Mono # 0.47      Eos # 0.16      Baso # 0.05      IG# 0.01      IG% 0.1      NRBC% 0.0     MAGNESIUM - Normal    Magnesium Level 2.10     TSH - Normal    TSH 4.442     T3,FREE (OLG ONLY) - Normal    T3 Free 3.11     T4, FREE - Normal    Thyroxine Free 0.93     TROPONIN I - Normal    Troponin-I <0.010     D DIMER, QUANTITATIVE - Normal    D-Dimer 0.31     LIPASE - Normal    Lipase Level 16     CBC W/ AUTO DIFFERENTIAL    Narrative:     The following orders were created for panel order CBC auto differential.  Procedure                               Abnormality         Status                     ---------                               -----------         ------                     CBC with Differential[[id number]]       Abnormal            Final result                  Please view results for these tests on the individual orders.   EXTRA TUBES    Narrative:     The following orders were created for panel order EXTRA TUBES.  Procedure                               Abnormality         Status                     ---------                               -----------         ------                     Red Top Hold[[id number]]                                    Final result               Lavender Top Hold[[id number]]                               Final result               Gold Top Hold[[id number]]                                   Final result                 Please view results for these tests on the individual orders.   RED TOP HOLD    Extra Tube Hold for add-ons.     LAVENDER TOP HOLD    Extra Tube Hold for add-ons.     GOLD TOP HOLD    Extra Tube Hold for add-ons.       EKG Readings: (Independently Interpreted)   Rhythm: Normal Sinus Rhythm. Heart Rate: 90. Ectopy: No Ectopy. Conduction: Normal. ST Segments: Normal ST Segments. T Waves: Normal. Clinical Impression: Normal Sinus Rhythm     ECG Results              EKG 12-lead (In process)        Collection Time Result Time QRS Duration OHS QTC Calculation    12/09/24 17:44:45 12/09/24 17:46:00 78 428                     In process by Interface, Lab In [facility] (12/09/24 17:46:05)                   Narrative:    Test Reason : R07.9,    Vent. Rate :  90 BPM     Atrial Rate :  90 BPM     P-R Int : 146 ms          QRS Dur :  78 ms      QT Int : 350 ms       P-R-T Axes :  76  75  74 degrees    QTcB Int : 428 ms    Normal sinus rhythm  Possible Left atrial enlargement  Borderline Abnormal ECG  When compared with ECG of 27-Sep-2024 21:01,  No significant change was found    Referred By:            Confirmed By:                                   Imaging Results              CTA Chest Non-Coronary (PE Studies) (Final result)  Result time 12/09/24 21:03:37      Final result by True Dela Cruz MD (12/09/24 21:03:37)                    Impression:      No pulmonary thromboembolism identified.      Electronically signed by: True Dela Cruz  Date:    12/09/2024  Time:    21:03               Narrative:    EXAMINATION:  CTA CHEST NON CORONARY (PE STUDIES)    CLINICAL HISTORY:  chest pain;    TECHNIQUE:  Sequential axial images performed of the chest using pulmonary embolism protocol following intravenous contrast bolus. Sagittal and contrast reformations performed.    Dose product length of 85 mGycm. Automated exposure control was utilized to minimize radiation dose.    COMPARISON:  December 1, 2021 chest radiograph December 9, 2024    FINDINGS:  Optimal contrast bolus timing with adequately opacified pulmonary artery system is without evidence of filling defects from pulmonary thromboembolism within the main pulmonary artery and the major branches.  Thoracic aorta is without aneurysmal dilatation and there are no signs of dissection.    Lungs mild hypoventilatory changes without groundglass pneumonitis or pulmonary venous hypertension. There are no pulmonary consolidations. The pleural and pericardial spaces are without fluid accumulation.  Chest lymph nodes are not enlarged.  Osseous structures without significant findings.                                       X-Ray Chest PA And Lateral (Final result)  Result time 12/09/24 18:37:52      Final result by Hector Young MD (12/09/24 18:37:52)                   Impression:      No acute chest disease is identified.      Electronically signed by: Hector Young  Date:    12/09/2024  Time:    18:37               Narrative:    EXAMINATION:  XR CHEST PA AND LATERAL    CLINICAL HISTORY:  chest pain;, .    COMPARISON:  October 10, 2022    FINDINGS:  No alveolar consolidation, effusion, or pneumothorax is seen.   The thoracic aorta is normal  cardiac silhouette, central pulmonary vessels and mediastinum are normal in size and are grossly unremarkable.   visualized osseous structures are grossly  unremarkable.                                       Medications   aluminum-magnesium hydroxide 200-200 mg/5 mL suspension 30 mL (30 mLs Oral Given 12/9/24 1902)   LIDOcaine viscous HCl 2% oral solution 5 mL (5 mLs Oral Given 12/9/24 1902)   ketorolac tablet 10 mg (10 mg Oral Given 12/9/24 1955)   iohexoL (OMNIPAQUE 350) 350 mg iodine/mL injection (85 mLs Intravenous Given 12/9/24 2030)     Medical Decision Making  29 year old female presents with complaints of CP and SOB. She describes it as a squeezing feeling midchest. She associates it with palpitations. She has a history of anxiety, depression, gastric motility disorder, graves disease, ovarian cyst, anemia, pelvic congestion syndrome, chronic poor dentition, POTS, spinal radiculopathy, smoker.She is currently being worked up for MALS by GI.     Hospital Course: CBC, CMP, TSH, T4, T3, Troponin, and d dimer all wnl. CXR normal without pneumonia. EKG nonischemic. Patient is very nervous and requesting a CTA to rule out a thoracic aortic aneurysm, as she states that her grandfather had that. After discussing risks of unnecessary Cts with patient, she states she needs the CT before she can feel comfortable going home. Patient was given a GI cocktail without relief and Toradol with no relief. CTA normal without PE, pleural effusion, pneumonia, or aortic dilation. Discussed this with patient. She states she feels like she may have mucous in her chest from a previous cold she had. I have instructed her to take Mucinex and to return with any worsening symptoms. Stable for discharge.           Amount and/or Complexity of Data Reviewed  External Data Reviewed: notes.     Details: Gastro note 10/31/24  11/01/24 18:42  WBC: 6.85  RBC: 4.09 (L)  Hemoglobin: 11.7 (L)  Hematocrit: 35.9 (L)  MCV: 87.8  MCH: 28.6  MCHC: 32.6 (L)  RDW: 15.0  Platelet Count: 221  MPV: 12.1 (H)  Neut %: 51.3  LYMPH %: 34.7  Mono %: 9.3  Eos %: 3.9  Basophil %: 0.7  Immature Granulocytes: 0.1  Neut  #: 3.50  Lymph #: 2.38  Mono #: 0.64  Eos #: 0.27  Baso #: 0.05  Immature Grans (Abs): 0.01  nRBC: 0.0  Sodium: 140  Potassium: 3.6  Chloride: 107  CO2: 27  Anion Gap: 6.0  BUN: 6.0 (L)  Creatinine: 0.78  BUN/CREAT RATIO: 8  eGFR: >60  Glucose: 98  Calcium: 9.3  ALP: 52  PROTEIN TOTAL: 7.1  Albumin: 4.0  Albumin/Globulin Ratio: 1.3  BILIRUBIN TOTAL: 0.2  AST: 11  ALT: 5  Globulin, Total: 3.1  TSH: 3.126  hCG Qualitative, Urine: Negative  POCT URINE PREGNANCY: Rpt   Acceptable: Yes      (L): Data is abnormally low  (H): Data is abnormally high  Rpt: View report in Results Review for more information  Labs: ordered. Decision-making details documented in ED Course.  Radiology: ordered. Decision-making details documented in ED Course.    Risk  OTC drugs.  Prescription drug management.      Additional MDM:   Differential Diagnosis:   Miocardial infarction, pneumothorax, aortic dissection, pulmonary emboli, pneumonia, among others              ED Course as of 12/10/24 0129   Mon Dec 09, 2024   1854 Transitioned to SELENA KIRKPATRICK [LS]   1900 I, Concepcion Rodriguez PA-C, assumed care of patient at 1900.  [VH]      ED Course User Index  [LS] Peyton Bonds, FNP  [VH] Concepcion Rodriguez PA-C                           Clinical Impression:  Final diagnoses:  [R07.9] Chest pain  [R00.0] Tachycardia  [R07.9] Chest pain, unspecified type (Primary)          ED Disposition Condition    Discharge Stable          ED Prescriptions    None       Follow-up Information       Follow up With Specialties Details Why Contact Info    Courtney Dhaliwal MD Internal Medicine   [address] [facility] [address]  [phone]      [facility] Emergency Medicine In 3 days As needed, If symptoms worsen [address] [facility] [phone]             Concepcion Rodriguez PA-C  12/10/24 0136

## 2024-12-10 LAB
OHS QRS DURATION: 78 MS
OHS QTC CALCULATION: 428 MS

## 2024-12-13 ENCOUNTER — OFFICE VISIT (OUTPATIENT)
Dept: ENDOCRINOLOGY | Facility: CLINIC | Age: 29
End: 2024-12-13
Payer: MEDICAID

## 2024-12-13 ENCOUNTER — PATIENT MESSAGE (OUTPATIENT)
Dept: ENDOCRINOLOGY | Facility: CLINIC | Age: 29
End: 2024-12-13

## 2024-12-13 VITALS — SYSTOLIC BLOOD PRESSURE: 105 MMHG | HEART RATE: 100 BPM | DIASTOLIC BLOOD PRESSURE: 58 MMHG

## 2024-12-13 DIAGNOSIS — R00.2 PALPITATIONS: ICD-10-CM

## 2024-12-13 DIAGNOSIS — R05.9 COUGH, UNSPECIFIED TYPE: ICD-10-CM

## 2024-12-13 DIAGNOSIS — E04.2 MULTINODULAR GOITER: ICD-10-CM

## 2024-12-13 DIAGNOSIS — R73.01 IMPAIRED FASTING GLUCOSE: ICD-10-CM

## 2024-12-13 DIAGNOSIS — Z72.0 TOBACCO USE: ICD-10-CM

## 2024-12-13 DIAGNOSIS — E05.00 GRAVES DISEASE: Primary | ICD-10-CM

## 2024-12-13 RX ORDER — ALBUTEROL SULFATE 90 UG/1
2 INHALANT RESPIRATORY (INHALATION) EVERY 6 HOURS PRN
Qty: 18 G | Refills: 0 | Status: SHIPPED | OUTPATIENT
Start: 2024-12-13 | End: 2025-12-13

## 2024-12-13 NOTE — PROGRESS NOTES
Subjective     Patient ID: Royce Quigley is a 29 y.o. female.    Chief Complaint: Follow-up (Graves disease)    0/15/2020 Telemedicine Visit Note- Endocrine Clinic: 26 y/o female schedule for endocrine clinic F/U for Graves disease. Pt is 7 weeks post-partum and currently on MMI 5mg daily. TSH 0.043 Free T4 1.20 T4 9.1.  Patient reports a couple of weeks after pregnancy and she felt like she was hyperthyroid and had an old prescription of MMI at home and started on 5 mg, then she states 3 weeks ago she went to her primary care provider and was prescribed a new prescription of MMI 5 mg.  She states she has been on the new prescription that is not  for approximately 3 weeks.  She reports that her symptoms of palpitations, tremors and anxiety have slightly improved but she still continues with palpitations, weight loss.     2021 Endocrine Clinic Note: 26 year old female scheduled today's endocrine clinic follow-up for Graves' disease.  Patient was previously in remission with pregnancy and delivered 2020.  4 to 5 weeks after pregnancy patient felt like she was returning to hyperthyroidism and had restarted her MMI. Last documentation 2020 patient's TSH was 10.521 MMI 10 mg twice a day was reduced to once a day. She is currently MMI 5mg (1/2 of 10mg tablet), She reports she was seeing a health care provider in [place] who changed her medication. Pt states she feel speedy now, with palpitations, anxiety. Pt weight has increased. Will check labs today. (1)     2021 Endocrine Clinic Note: 26 year old female scheduled today for Endocrine follow-up.  History of Graves' disease/palpitations.  Patient has continued her MMI 5 mg and states when she ran out she had an old prescription that was 2-3 years old that she restarted she states she is severely worried about going back into hyperthyroidism.  Current labs TSH 0.855, free T4 1.0015 mg of MMI.  Discussed with patient holding MMI to  assess for remission patient states that she has a great fear from returning to hyperthyroidism and she is currently symptomatic with weight loss and palpitations and does not want to stop her MMI.  Instructed patient we will decrease to a half of a tablet and recheck labs in 3 weeks and to reconsider holding MMI.  Tobacco use patient smokes half a pack per day encourage cessation. (1)     3/15/2022 endocrine clinic note: 27-year-old female scheduled today for endocrine clinic follow-up. History of Graves' disease/palpitations. Graves' disease patient is currently in MMI 5 mg most recent TSH On 01/25/2022 TSH 2.2292.  Patient reports continued palpitations, excessive sweating and reports weight loss.  Patient also has a history of anxiety.  Patient is currently undergoing a cardiac work-up for palpitations and also was referred to gastro for an upper scope but has not completed yet. Enlarged thyroid multinodular goiter patient had ultrasound 6/28/2019 IMPRESSION: Heterogenicity in hypervascularity of the thyroid gland might be related to thyroid disease. Subcentimeter nodules as above do not meet criteria for biopsy.      Endocrine Clinic Note 07/19/2022: 27-year-old female scheduled today for endocrine clinic follow-up. History of Graves disease, multinodular goiter, enlarged thyroid, palpitations. Previous TSH 2.2292 on 01/25/2022 patient was previously held off MI and reported symptoms of palpitation, increased anxiety and tremors. Currently she is on MMI 5 mg. Palpitations patient reports occasional palpitations she states when she went to her PCP 1 week ago her  today patient's heart rate is 92 patient states compliance with her propanolol. Multinodular goiter/enlarged thyroid repeat ultrasound 03/23/2022 Mild diffuse heterogeneity and hypervascularity at the thyroid gland suggesting diffuse thyroiditis with several small benign sub centimeter nodules as detailed above. No significant interval change  compared to the thyroid ultrasound in 2019 aside from interval decrease in size of the left thyroid nodule.  Patient denies any new palpable nodules but does report mild dysphagia at times. Tobacco use patient is smoking 1 pack per day states she does not want states patient's program at this time she states she is not want to quit due to her anxiety.     Endocrine Clinic Note 01/19/2023:  27-year-old female scheduled today for endocrine clinic follow-up.  History of Graves disease, multinodular goiter and enlarged thyroid, palpitations. TSH 2.0166, Free T3 2.98, Free T4 0.91 on 07/19/2022 patient states when MMI stops she continue with palpitations and extreme anxiety increased symptoms of hyperthyroidism.  Patient remained on MMI 5 mg reports to clinic today and reports occasional palpitations and anxiety.  Patient's low blood pressure today for took a Xanax just prior to her visit along with propranolol 10 mg last night.  Patient currently being followed by cardiologist.  Multinodular goiter repeat ultrasound 03/23/2022 IMPRESSION: Mild diffuse heterogeneity and hypervascularity at the thyroid gland suggesting diffuse thyroiditis with several small benign subcentimeter nodules as detailed above. No significant interval change compared to the thyroid ultrasound in 2019 aside from interval decrease in size of the left thyroid nodule.  Patient is concerned developing type 1 diabetes for grandmother has a history of type 1 diabetes and discussing today like she is pancreatic insufficiency.  Patient's recent glucose of 139 previous glucoses were within normal limits.      Endocrine clinic note 07/24/2023:  28-year-old female day endocrine clinic follow-up.  History of Graves disease, multinodular goiter enlarged thyroid, palpitations.  TSH 2.769, Free T4 1.00, Free T3 3.05 on 01/19/2023.  Patient is currently on MMI 5 mg Patient states when dose is lowered she has symptoms of palpitations symptoms of hyperthyroidism.   Multinodular goiter ultrasound 03/23/2023 IMPRESSION: Mild diffuse heterogeneity and hypervascularity at the thyroid gland suggesting diffuse thyroiditis with several small benign subcentimeter nodules as detailed above. No significant interval change compared to the thyroid ultrasound in 2019 aside from interval decrease in size of the left thyroid nodule.  Patient denies any palpable nodules but she does note palpable lymph node close to her right ear that has been palpable she states for over a year nontender.         Telemedicine Notes:  Endocrine clinic note 11/28/2023:  28-year-old female scheduled today for endocrine clinic follow-up.  History of Graves disease, multinodular goiter with enlarged thyroid and palpitations. Graves disease pt is on MMI 2.5 mg previous TSH 4.623, free T3 2.87, Free T4 0.79 on 07/24/2023 MMI reduced to half a tablet at that time.  Attempted to DC methimazole of increased anxiety and palpitations once off methimazole.  Pt takes Xanax for her anxiety.  Patient reports enlarged thyroid which is comfortable she states she is considering a thyroidectomy but wants to wait until her stomach issues resolved.  Discussed with the patient if she desires thyroidectomy we will hold MMI to see if she goes hyperthyroid before thyroidectomy can be done.          Telemedicine Notes:  Endocrine clinic note 03/05/2024:  28 year female scheduled today for endocrine clinic follow-up.  History of Graves disease and autoimmune thyroiditis palpitations and history of anxiety.  Patient was on methimazole 2.5 mg Free T4 0.79 Free T3 3.03 on 02/28/2024, TPO >1.543, TSI 1.1, Trab 3.40 on 02/28/2024.  Patient reports she continues with anxiety whether related thyroid has a history of anxiety.  Patient does not report any weight changes.  She does report fatigue.         Telemedicine Notes:  Endocrine clinic note 06/11/2024:  29-year-old female scheduled today for endocrine clinic follow-up.  History of Graves  disease and autoimmune thyroiditis with palpitations and history of anxiety.  Patient is currently on methimazole 2.5 mg previous labs Free T4 0.79 Free T3 3.03 on 02/28/2024, TPO >1.543, TSI 1.1, Trab 3.40 on 02/28/2024.  Patient had labs in the system to complete put has limitations with transportation was unable to complete.  Patient on today's visit had a positive screening for depression she states this is due to all her health issues and having limited transportation to get to visits and having to see so many specialists.  Patient was previously seen in ENT for chronic sinusitis a needing surgery but states the ENT moved and she had no longer has an ENT.  Multinodular goiter ultrasound 07/24/2023 subcentimeter.  Tobacco abuse patient states she is up to 1 pack per day currently with her anxiety she has not desiring cessation.      Telemedicine Notes:  Endocrine clinic note 09/10/2024:  29-year-old female scheduled today for endocrine clinic follow-up.  History of Graves disease, palpitations, thyroid nodules and tobacco use.  Graves disease pt is on MMI 2.5 mg. TSH 3.945, free T4 0.75, free T3 2.64 on 07/10/2024. Free T4 0.79 Free T3 3.03 on 02/28/2024. TPO >1.543, TSI 1.1, Trab 3.40 on 02/28/2024.  Patient was instructed to stop methimazole my stated she was scared to go hyperthyroid remain on methimazole 2.5 mg per day.  Patient denies weight gain.  Patient continues with palpitation previously thought to be anxiety related.  Tobacco abuse patient continues with slow a half to 1 pack per day.  Previously tried patches work was unable to quit.  Discussed with the patient for 4 minutes on smoking sensation and retry him methods once determined to quit.  Multinodular goiter patient was due for yearly follow-up ultrasound orders placed in the system no new palpable nodules are noted.      The patient location is: Home   The chief complaint leading to consultation is: Graves disease     Visit type:  audiovisual    Face to Face time with patient: 10  25 minutes of total time spent on the encounter, which includes face to face time and non-face to face time preparing to see the patient (eg, review of tests), Obtaining and/or reviewing separately obtained history, Documenting clinical information in the electronic or other health record, Independently interpreting results (not separately reported) and communicating results to the patient/family/caregiver, or Care coordination (not separately reported).         Each patient to whom he or she provides medical services by telemedicine is:  (1) informed of the relationship between the physician and patient and the respective role of any other health care provider with respect to management of the patient; and (2) notified that he or she may decline to receive medical services by telemedicine and may withdraw from such care at any time.    Telemedicine Notes: Endocrine Clinic 12/13/2024:1-year-old female scheduled today for endocrine clinic follow-up.  History of Graves disease, palpitations, thyroid nodules and tobacco use.  Graves disease pt is on MMI 2.5 mg.  Patient was recently seen in the ED stating she was having chest pain on a cough patient had a workup thyroid functions were normal TSH 4.442, free T4 0.93, free T3 3.11 on 12/09/2024.  Patient remains on methimazole she has has a fear going back hyperthyroid has a increased anxiety and palpitations when off of methimazole.  Today patient is reporting that her son was sick had a cough for the last 2 weeks and that her roommate a sick and she is currently sick with a cough and nasal congestion.  Multinodular goiter patient is due for repeat ultrasound.  Patient had missed her previous ultrasound lack of transportation.  Patient had recent impaired fasting blood glucose at 120 previous A1c 2 years ago 4.8 we will recheck A1c               Narrative & Impression  Thyroid ultrasound     INDICATION: 27-year-old  woman with enlarged thyroid gland/nodules.     TECHNIQUE: Real-time grayscale and color Doppler sonographic  evaluation of the thyroid gland was performed per routine protocol.     COMPARISON: Thyroid ultrasound 6/28/2019.     FINDINGS:     There is mild enlargement at the right thyroid lobe with the gland  otherwise normal and size and contour. The right thyroid lobe measures  5.4 x 1.6 x 1.9 cm and the left lobe 4.5 x 1.6 x 1.4 cm. The isthmus  measures just under 0.2 cm in AP thickness. A small colloid cyst at  the right thyroid lobe measures 5 x 3 x 4 mm and a second small and  well marginated solid hypoechoic nodule at the right posterior thyroid  lobe 5 x 3 x 5 mm. A single small colloid cyst in the left thyroid  lobe measures 3 x 2 x 3 mm. There is mild diffuse heterogeneity of the  thyroid gland parenchyma along with diffuse hypervascularity at the  gland.     IMPRESSION:     Mild diffuse heterogeneity and hypervascularity at the thyroid gland  suggesting diffuse thyroiditis with several small benign subcentimeter  nodules as detailed above. No significant interval change compared to  the thyroid ultrasound in 2019 aside from interval decrease in size of  the left thyroid nodule.  Electronically Signed By: Erasmo Pa MD  Date/Time Signed: 03/23/2022 16:27     Specimen Collected: 03/23/22 14:13 Last Resulted: 03/23/22 14:13                       Review of Systems   Constitutional:  Negative for activity change, appetite change and fatigue.   HENT:  Positive for postnasal drip and rhinorrhea. Negative for dental problem, hearing loss, tinnitus, trouble swallowing and goiter.    Eyes:  Negative for photophobia, pain and visual disturbance.   Respiratory:  Positive for cough and chest tightness. Negative for wheezing.    Cardiovascular:  Negative for chest pain, palpitations and leg swelling.   Gastrointestinal:  Negative for abdominal pain, constipation, diarrhea, nausea and reflux.   Endocrine: Negative  for cold intolerance, heat intolerance, polydipsia and polyphagia.   Genitourinary:  Negative for difficulty urinating, flank pain, hematuria, hot flashes, menstrual irregularity, menstrual problem, nocturia and urgency.   Musculoskeletal:  Negative for back pain, gait problem, joint swelling, leg pain and joint deformity.   Integumentary:  Negative for color change, pallor, rash and breast discharge.   Allergic/Immunologic: Negative for environmental allergies, food allergies and immunocompromised state.   Neurological:  Negative for tremors, seizures, headaches, memory loss and coordination difficulties.   Psychiatric/Behavioral:  Negative for agitation, behavioral problems and sleep disturbance. The patient is not nervous/anxious.           Objective     Physical Exam  Constitutional:       Appearance: Normal appearance.   HENT:      Head: Normocephalic.      Nose: Congestion and rhinorrhea present.   Eyes:      Conjunctiva/sclera: Conjunctivae normal.   Pulmonary:      Effort: Pulmonary effort is normal.   Musculoskeletal:      Cervical back: Normal range of motion.   Neurological:      Mental Status: She is alert.   Psychiatric:         Mood and Affect: Mood normal.         Behavior: Behavior normal.         Thought Content: Thought content normal.         Judgment: Judgment normal.            Assessment and Plan       1. Graves disease  On MMI 5 mg 1/2 tablet once a day   TSH 4.442, free T4 0.93, free T3 3.11 on 12/09/2024  TPO >1.543, TSI 1.1, Trab 3.40 on 02/28/2024   Component Ref Range & Units 4 d ago  (12/9/24) 1 mo ago  (11/1/24) 5 mo ago  (7/10/24) 1 yr ago  (8/18/23) 1 yr ago  (7/24/23) 1 yr ago  (1/19/23) 2 yr ago  (7/19/22)   TSH 0.350 - 4.940 uIU/mL 4.442 3.126 3.945 1.926 4.623 2.769 2.0166 R     Component Ref Range & Units 4 d ago  (12/9/24) 5 mo ago  (7/10/24) 9 mo ago  (2/28/24) 1 yr ago  (7/24/23) 1 yr ago  (1/19/23) 2 yr ago  (7/19/22) 3 yr ago  (3/22/21)   T3 Free 1.58 - 3.91 pg/mL 3.11 2.64  3.03 2.87 3.05 R 2.98 R 3.02 R     Component Ref Range & Units 4 d ago  (12/9/24) 5 mo ago  (7/10/24) 9 mo ago  (2/28/24) 1 yr ago  (7/24/23) 1 yr ago  (1/19/23) 2 yr ago  (7/19/22) 2 yr ago  (12/19/21)   Thyroxine Free 0.70 - 1.48 ng/dL 0.93 0.75 0.79 0.79 1.00 0.91 0.91   -     US Thyroid; Future; Expected date: 12/14/2024    2. Palpitations  Continues w/ palpitations  No medication does time likely anxiety related    3. Multinodular goiter  Subcentimeter meter nodules ultrasound 07/24/2023   -     US Thyroid; Future; Expected date: 12/14/2024    4. Tobacco use  Smoking 1/2-1 pack a day  Discussed cessation total 4 minutes    5. Impaired fasting glucose  -     Hemoglobin A1C; Future; Expected date: 12/13/2024    6. Cough, unspecified type  -     albuterol (PROVENTIL HFA) 90 mcg/actuation inhaler; Inhale 2 puffs into the lungs every 6 (six) hours as needed for Wheezing (cough). Rescue  Dispense: 18 g; Refill: 0  -     COVID/RSV/FLU A&B PCR; Future; Expected date: 12/13/2024              Follow up in about 4 months (around 4/13/2025) for taz Guzman visit.

## 2025-02-22 ENCOUNTER — HOSPITAL ENCOUNTER (EMERGENCY)
Facility: HOSPITAL | Age: 30
Discharge: HOME | End: 2025-02-22
Attending: INTERNAL MEDICINE
Payer: MEDICAID

## 2025-02-22 VITALS
RESPIRATION RATE: 16 BRPM | TEMPERATURE: 99 F | HEIGHT: 60 IN | BODY MASS INDEX: 22.51 KG/M2 | WEIGHT: 114.63 LBS | DIASTOLIC BLOOD PRESSURE: 85 MMHG | OXYGEN SATURATION: 100 % | SYSTOLIC BLOOD PRESSURE: 137 MMHG | HEART RATE: 125 BPM

## 2025-02-22 DIAGNOSIS — R07.9 CHEST PAIN: ICD-10-CM

## 2025-02-22 DIAGNOSIS — R10.13 EPIGASTRIC PAIN: Primary | ICD-10-CM

## 2025-02-22 LAB
ABS NEUT CALC (OHS): 4.59 X10(3)/MCL (ref 2.1–9.2)
ALBUMIN SERPL-MCNC: 4 G/DL (ref 3.5–5)
ALBUMIN/GLOB SERPL: 1.2 RATIO (ref 1.1–2)
ALP SERPL-CCNC: 44 UNIT/L (ref 40–150)
ALT SERPL-CCNC: 9 UNIT/L (ref 0–55)
ANION GAP SERPL CALC-SCNC: 7 MEQ/L
ANISOCYTOSIS BLD QL SMEAR: ABNORMAL
AST SERPL-CCNC: 11 UNIT/L (ref 5–34)
BILIRUB SERPL-MCNC: 0.2 MG/DL
BUN SERPL-MCNC: 7.8 MG/DL (ref 7–18.7)
CALCIUM SERPL-MCNC: 9.4 MG/DL (ref 8.4–10.2)
CHLORIDE SERPL-SCNC: 106 MMOL/L (ref 98–107)
CO2 SERPL-SCNC: 25 MMOL/L (ref 22–29)
CREAT SERPL-MCNC: 0.68 MG/DL (ref 0.55–1.02)
CREAT/UREA NIT SERPL: 11
ELLIPTOCYTOSIS (OHS): SLIGHT
EOSINOPHIL NFR BLD MANUAL: 0.16 X10(3)/MCL (ref 0–0.9)
EOSINOPHIL NFR BLD MANUAL: 2 % (ref 0–8)
ERYTHROCYTE [DISTWIDTH] IN BLOOD BY AUTOMATED COUNT: 14.1 % (ref 11.5–17)
GFR SERPLBLD CREATININE-BSD FMLA CKD-EPI: >60 ML/MIN/1.73/M2
GLOBULIN SER-MCNC: 3.4 GM/DL (ref 2.4–3.5)
GLUCOSE SERPL-MCNC: 96 MG/DL (ref 74–100)
HCT VFR BLD AUTO: 37.3 % (ref 37–47)
HGB BLD-MCNC: 11.9 G/DL (ref 12–16)
LACTATE SERPL-SCNC: 0.4 MMOL/L (ref 0.5–2.2)
LYMPHOCYTES NFR BLD MANUAL: 2.87 X10(3)/MCL (ref 0.6–4.6)
LYMPHOCYTES NFR BLD MANUAL: 35 % (ref 13–40)
MCH RBC QN AUTO: 28.8 PG (ref 27–31)
MCHC RBC AUTO-ENTMCNC: 31.9 G/DL (ref 33–36)
MCV RBC AUTO: 90.3 FL (ref 80–94)
MONOCYTES NFR BLD MANUAL: 0.57 X10(3)/MCL (ref 0.1–1.3)
MONOCYTES NFR BLD MANUAL: 7 % (ref 2–11)
NEUTROPHILS NFR BLD MANUAL: 56 % (ref 47–80)
NRBC BLD AUTO-RTO: 0 %
PLATELET # BLD AUTO: 208 X10(3)/MCL (ref 130–400)
PLATELET # BLD EST: NORMAL 10*3/UL
PMV BLD AUTO: 11.6 FL (ref 7.4–10.4)
POIKILOCYTOSIS BLD QL SMEAR: SLIGHT
POTASSIUM SERPL-SCNC: 4.1 MMOL/L (ref 3.5–5.1)
PROT SERPL-MCNC: 7.4 GM/DL (ref 6.4–8.3)
RBC # BLD AUTO: 4.13 X10(6)/MCL (ref 4.2–5.4)
RBC MORPH BLD: ABNORMAL
SODIUM SERPL-SCNC: 138 MMOL/L (ref 136–145)
WBC # BLD AUTO: 8.2 X10(3)/MCL (ref 4.5–11.5)

## 2025-02-22 PROCEDURE — 83605 ASSAY OF LACTIC ACID: CPT | Performed by: INTERNAL MEDICINE

## 2025-02-22 PROCEDURE — 85025 COMPLETE CBC W/AUTO DIFF WBC: CPT | Performed by: INTERNAL MEDICINE

## 2025-02-22 PROCEDURE — 80053 COMPREHEN METABOLIC PANEL: CPT | Performed by: INTERNAL MEDICINE

## 2025-02-22 PROCEDURE — 99284 EMERGENCY DEPT VISIT MOD MDM: CPT | Mod: 25

## 2025-02-22 PROCEDURE — 93005 ELECTROCARDIOGRAM TRACING: CPT

## 2025-02-23 NOTE — ED PROVIDER NOTES
Encounter Date: 2/22/2025       History     Chief Complaint   Patient presents with    Abdominal Pain     LUQ abd pain radiating to the epigastric area. States intermittent for 1-2 years, but this evening she started getting flush and weak.      This is a 29 year old female with the known history of graves disease and chronic abdominal pain(epigastric) with intermittent nausea and vomiting. The patient follows with a gastroenterologist and she is being worked up for MALS and gastric emptying issues, so far the work up done showed increased velocity in celiac trunk. She has done 4 CT scans last year. Today she stated that she is feeling different, the symptoms are different from before stated that something is squeezing her between chest and stomach area and she was flushing from the head to abdomen lasted for couple of minutes, she checked her vitals and she had a temp of 100 at home, she has taken tylenol before she checked her temp for her headache. She feels something is burning in her head. She further added that when she walking from the triage area to the bed, she felt weird.  She denied taking any vitamin supplements or drinking alcohol. She smokes a pack cigarettes a day. Denied any recreational drug usage.    The history is provided by the patient and medical records. No  was used.     Review of patient's allergies indicates:   Allergen Reactions    Benadryl [diphenhydramine hcl] Anxiety     Hallucinations     Diphenhydramine Anxiety and Other (See Comments)     Past Medical History:   Diagnosis Date    Anxiety disorder, unspecified     Depression     Essential fatty acid deficiency     Gastric motility disorder     Graves disease     History of ovarian cyst     frequent, reoccurring with hx of rupture and hemorrhage    Iron deficiency anemia, unspecified     Pelvic congestion syndrome     Poor dentition     POTS (postural orthostatic tachycardia syndrome)     Smoker     Vitamin D  "deficiency      Past Surgical History:   Procedure Laterality Date    CHOLECYSTECTOMY      DX-LAP, EVACUATION HEMOPERITONEUM  11/30/2021    GALLBLADDER SURGERY      LAPAROSCOPIC CHOLECYSTECTOMY N/A 10/10/2022    Procedure: CHOLECYSTECTOMY, LAPAROSCOPIC;  Surgeon: Tobin Aguiar MD;  Location: [facility];  Service: General;  Laterality: N/A;    TONSILLECTOMY  11/2011     Family History   Problem Relation Name Age of Onset    Heart disease Paternal Grandfather Dany Baeza II     Cancer Paternal Grandfather Dany Baeza II     Stroke Paternal Grandfather Dany Baeza II     Heart disease Paternal Grandmother Sophie Baeza     Pancreatic cancer Maternal Grandmother Yahaira "Casi" Nadira     Ovarian cancer Maternal Grandmother Yahaira "Casi" Nadira     Cancer Maternal Grandmother Yahaira "Casi" Nadira     Heart disease Father Dany Baeza III     Heart attack Father Dany Baeza III     Coronary artery disease Father Dany Baeza III     Cancer Mother Priscilla Janene         colorectal cancer    Bipolar disorder Mother Priscilla Janene     Irritable bowel syndrome Mother Priscilla Janene     No Known Problems Brother      Kidney disease Sister      No Known Problems Son 2     Stroke Paternal Aunt Emma Baeza     Stroke Maternal Aunt Atamona Longwood      Social History[1]  Review of Systems   Constitutional:  Positive for fever. Negative for activity change, appetite change, chills, diaphoresis, fatigue and unexpected weight change.   HENT: Negative.     Eyes: Negative.    Respiratory:  Positive for apnea. Negative for cough, chest tightness, shortness of breath, wheezing and stridor.    Cardiovascular:  Negative for chest pain, palpitations and leg swelling.   Gastrointestinal:  Positive for abdominal pain, nausea (intermittent) and vomiting (intermittent). Negative for abdominal distention.        Alternate bowel movement between diarrhea and constipation    Endocrine: Negative.    Genitourinary: Negative.    Musculoskeletal: Negative.  "   Skin: Negative.    Neurological:  Positive for light-headedness and headaches. Negative for tremors, seizures, syncope, facial asymmetry, speech difficulty and numbness.   Psychiatric/Behavioral:  The patient is nervous/anxious.        Physical Exam     Initial Vitals [02/22/25 2101]   BP Pulse Resp Temp SpO2   (!) 150/105 (!) 114 16 98.8 °F (37.1 °C) 100 %      MAP       --         Physical Exam    Nursing note and vitals reviewed.  Constitutional: She appears well-developed and well-nourished. She is not diaphoretic. She appears distressed.   HENT: Mouth/Throat: Oropharynx is clear and moist.   Eyes: Conjunctivae and EOM are normal. Pupils are equal, round, and reactive to light.   Neck: No tracheal deviation present. No JVD present.   Cardiovascular:  Regular rhythm and normal heart sounds.           No murmur heard.  Tachycardia   Pulmonary/Chest: Breath sounds normal. No stridor. No respiratory distress. She has no wheezes. She has no rales.   Abdominal: Bowel sounds are normal. She exhibits no distension and no mass. There is abdominal tenderness. There is no rebound and no guarding.   Musculoskeletal:         General: No tenderness or edema.     Lymphadenopathy:     She has no cervical adenopathy.   Neurological: She is alert and oriented to person, place, and time. GCS score is 15. GCS eye subscore is 4. GCS verbal subscore is 5. GCS motor subscore is 6.   Skin: Skin is warm. No erythema.   Psychiatric: Thought content normal.         ED Course   Procedures  Labs Reviewed   LACTIC ACID, PLASMA - Abnormal       Result Value    Lactic Acid Level 0.4 (*)    CBC WITH DIFFERENTIAL - Abnormal    WBC 8.20      RBC 4.13 (*)     Hgb 11.9 (*)     Hct 37.3      MCV 90.3      MCH 28.8      MCHC 31.9 (*)     RDW 14.1      Platelet 208      MPV 11.6 (*)     NRBC% 0.0     MANUAL DIFFERENTIAL - Abnormal    Neutrophils % 56      Lymphs % 35      Monocytes % 7      Eosinophils % 2      Neutrophils Abs Calc 4.592      Lymphs  Abs 2.87      Eosinophils Abs 0.164      Monocytes Abs 0.574      Platelets Normal      RBC Morph Abnormal (*)     Anisocytosis 1+ (*)     Poikilocytosis Slight (*)     Elliptocytosis Slight (*)    CBC W/ AUTO DIFFERENTIAL    Narrative:     The following orders were created for panel order CBC auto differential.  Procedure                               Abnormality         Status                     ---------                               -----------         ------                     CBC with Differential[[id number]]       Abnormal            Final result               Manual Differential[[id number]]         Abnormal            Final result                 Please view results for these tests on the individual orders.   COMPREHENSIVE METABOLIC PANEL    Sodium 138      Potassium 4.1      Chloride 106      CO2 25      Glucose 96      Blood Urea Nitrogen 7.8      Creatinine 0.68      Calcium 9.4      Protein Total 7.4      Albumin 4.0      Globulin 3.4      Albumin/Globulin Ratio 1.2      Bilirubin Total 0.2      ALP 44      ALT 9      AST 11      eGFR >60      Anion Gap 7.0      BUN/Creatinine Ratio 11       EKG Readings: (Independently Interpreted)   Initial Reading: No STEMI. Rhythm: Normal Sinus Rhythm. Heart Rate: 94. Axis: Normal.       Imaging Results    None          Medications - No data to display  Medical Decision Making  Patient advised to reach out to her Gastroenterologist regarding the CTA abdomen scan that was ordered and during her stay in the she remained stable and considering these are her chronic issues advised her to follow up back with her PCP for further workup.     Amount and/or Complexity of Data Reviewed  External Data Reviewed: labs, radiology and notes.  Labs: ordered.     Details: No significant abnormalities   ECG/medicine tests: ordered and independent interpretation performed. Decision-making details documented in ED Course.      Additional MDM:   Differential Diagnosis:   Symptom:  Abdominal pain. <> The follow diagnoses were considered and will be evaluated: Mesenteric Ischemia.   Other: The following diagnoses were also considered and will be evaluated: MALS, Gastritis.           Attending Attestation:   Physician Attestation Statement for Resident:  As the supervising MD   Physician Attestation Statement: I have personally seen and examined this patient.   I agree with the above history.  -:   As the supervising MD I agree with the above PE.     As the supervising MD I agree with the above treatment, course, plan, and disposition.    I have reviewed and agree with the residents interpretation of the following: lab data.  I have reviewed the following: old records at this facility and old CTs.                                       Clinical Impression:  Final diagnoses:  [R07.9] Chest pain  [R10.13] Epigastric pain (Primary)          ED Disposition Condition    Discharge Fair          ED Prescriptions    None       Follow-up Information       Follow up With Specialties Details Why Contact Info    [facility] Emergency Medicine In 1 day If symptoms worsen [address] [address] [phone]             Megan Sloan MD  Resident  02/22/25 7659         [1]   Social History  Tobacco Use    Smoking status: Every Day     Current packs/day: 1.50     Average packs/day: 1.3 packs/day for 31.1 years (39.7 ttl pk-yrs)     Types: Cigarettes     Start date: 1/1/2008     Passive exposure: Never    Smokeless tobacco: Never   Substance Use Topics    Alcohol use: Never    Drug use: Never        Stevie Cui MD  02/23/25 1542

## 2025-02-24 LAB
OHS QRS DURATION: 74 MS
OHS QTC CALCULATION: 422 MS

## 2025-02-26 ENCOUNTER — PATIENT MESSAGE (OUTPATIENT)
Dept: GASTROENTEROLOGY | Facility: CLINIC | Age: 30
End: 2025-02-26
Payer: MEDICAID

## 2025-02-26 ENCOUNTER — TELEPHONE (OUTPATIENT)
Dept: GASTROENTEROLOGY | Facility: CLINIC | Age: 30
End: 2025-02-26
Payer: MEDICAID

## 2025-02-26 NOTE — TELEPHONE ENCOUNTER
----- Message from Med Assistant Wild sent at 2/26/2025  1:09 PM CST -----  Regarding: FW: Call requested  Contact: [phone]    ----- Message -----  From: Ranjan August  Sent: 2/26/2025  11:41 AM CST  To: Og Macias Staff  Subject: Call requested                                   Hi,Who called: The pt Reason: The pt would like to spk with the nurse to discuss getting an order sent over to an outside facility(Pt does not have information on a facility). Pls call the pt at [phone]'s name: The pt Additional Information: Thank you.

## 2025-02-26 NOTE — TELEPHONE ENCOUNTER
Gave the patient the number for scheduling at [facility] in [place] [phone] to call to get her CTA scheduled.

## 2025-03-05 ENCOUNTER — OFFICE VISIT (OUTPATIENT)
Dept: INTERNAL MEDICINE | Facility: CLINIC | Age: 30
End: 2025-03-05
Payer: MEDICAID

## 2025-03-05 VITALS
HEART RATE: 84 BPM | SYSTOLIC BLOOD PRESSURE: 104 MMHG | HEIGHT: 60 IN | BODY MASS INDEX: 22.11 KG/M2 | DIASTOLIC BLOOD PRESSURE: 62 MMHG | RESPIRATION RATE: 20 BRPM | WEIGHT: 112.63 LBS | TEMPERATURE: 98 F | OXYGEN SATURATION: 99 %

## 2025-03-05 DIAGNOSIS — K59.00 CONSTIPATION, UNSPECIFIED CONSTIPATION TYPE: ICD-10-CM

## 2025-03-05 DIAGNOSIS — N94.89 PELVIC CONGESTION SYNDROME: Primary | ICD-10-CM

## 2025-03-05 DIAGNOSIS — R10.2 PELVIC PAIN: ICD-10-CM

## 2025-03-05 PROCEDURE — 99214 OFFICE O/P EST MOD 30 MIN: CPT | Mod: PBBFAC | Performed by: STUDENT IN AN ORGANIZED HEALTH CARE EDUCATION/TRAINING PROGRAM

## 2025-03-05 NOTE — PROGRESS NOTES
INTERNAL MEDICINE CLINIC  CLINIC NOTE    Patient Name: Royce Quigley  YOB: 1995    PRESENTING HISTORY       History of Present Illness:  Ms. Royce Quigley is a 29 y.o. female w/ an active medical problem list including Grave's disease, chronic abdominal pain, and depression/anxiety who is presenting to [facility] IM clinic for follow up visit. She is Dr. Dhaliwal's primary care patient but will be seeing me today for her follow up visit.     10/2023: Per patient she has been seen by several specialists in the past including GI, cardiology, and vascular surgery with no resolution in symptoms.  She reports chronic abdominal pain over the last 3 years mostly located in her left upper quadrant and epigastric region associated with swelling and episodes of severe pain.  She has had multiple studies over the past year including CT abdomen/pelvis, CTA abdomen/pelvis, and abdominal US with no reported change.  She has a history of an exploratory laparotomy where she reportedly had a hemorrhage of an ovarian cyst requiring evacuation of a large amount of intraperitoneal fluid.   She also reports being diagnosed with POTS and nutcracker syndrome in the past but denies any treatment.  She also states she has a history of endocarditis for which she previously completed antibiotics for and was following with cardiology outpatient. She is also endorsing a 10 pound weight loss over the last 3 years with an inability to gain weight.  She reports abnormal bowel movements that occur daily.  She reports current abdominal pain 6/10.  She received EGD from Dr. Galindo earlier in the year with results showing esophagitis but no other abnormalities noted on report. She has been on several medications for abdominal pain including PPI's, pepcid, zofran, sucralfate with no resolution in symptoms.  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   She follows with psychiatrist for related issues and is currently on xanax PRN as well as duloxetine.       4/11/2024:  Patient here for follow-up.  Continues to endorse LUQ abdominal pain.  Similar complaints as discussed at prior visit as above.  Is following with new GI physician, Dr. Foley.  Recently had a gastric emptying study done that was inconclusive.  Per patient continuing workup with capsule endoscopy that she is working on rescheduling. Otherwise, patient reporting no new acute complaints.  States she feels she may experience constipation at times as well as bloating and loose stools but mostly reports abdominal pain and swelling in LUQ.  Continues to follow with psychiatrist and counselor for related issues.  Endorses compliance with medications.     03/5/25  Ms. Quigley continues to complain of the above symptoms. Mentions episodes of flushing, abdominal/chest/back pain, and elevated BP's. Pain is not related to exertion and is not post-prandial. Of note, after she endorsed her symptoms of flushing and elevated BP's, she asked specifically about neuroendocrine tumors. When saying that I could order serotonin or metanephrine levels, she asked if that was for carcinoid or pheochromocytoma respectively. She alternates between diarrhea/constipation but more often has loose stools. Is following with her GI doctor and being worked up for median arcuate ligament syndrome. Following with her cardiologist as well, Dr. Adames.         ROS  As above    PAST HISTORY:     Past Medical History:   Diagnosis Date    Anxiety disorder, unspecified     Depression     Essential fatty acid deficiency     Gastric motility disorder     Graves disease     History of ovarian cyst     frequent, reoccurring with hx of rupture and hemorrhage    Iron deficiency anemia, unspecified     Pelvic congestion syndrome     Poor dentition     POTS (postural orthostatic tachycardia syndrome)     Smoker     Vitamin D deficiency        Past  "Surgical History:   Procedure Laterality Date    CHOLECYSTECTOMY      DX-LAP, EVACUATION HEMOPERITONEUM  11/30/2021    GALLBLADDER SURGERY      LAPAROSCOPIC CHOLECYSTECTOMY N/A 10/10/2022    Procedure: CHOLECYSTECTOMY, LAPAROSCOPIC;  Surgeon: Tobin Aguiar MD;  Location: [facility];  Service: General;  Laterality: N/A;    TONSILLECTOMY  11/2011       Family History   Problem Relation Name Age of Onset    Heart disease Paternal Grandfather Dany Aryan II     Cancer Paternal Grandfather Dany Baeza II     Stroke Paternal Grandfather Dany Baeza II     Heart disease Paternal Grandmother Sophie Baeza     Pancreatic cancer Maternal Grandmother Yahaira "Casi" Nadira     Ovarian cancer Maternal Grandmother Yahaira "Casi" Nadira     Cancer Maternal Grandmother Yahaira "Casi" Nadira     Heart disease Father Dany Baeza III     Heart attack Father Dany Baeza III     Coronary artery disease Father Dany Baeza III     Cancer Mother Priscilla Janene         colorectal cancer    Bipolar disorder Mother Priscilla Janene     Irritable bowel syndrome Mother Priscilla Janene     No Known Problems Brother      Kidney disease Sister      No Known Problems Son 2     Stroke Paternal Aunt Emma Baeza     Stroke Maternal Aunt Atamona Warner        Social History     Socioeconomic History    Marital status: Significant Other   Tobacco Use    Smoking status: Every Day     Current packs/day: 1.50     Average packs/day: 1.3 packs/day for 31.2 years (39.8 ttl pk-yrs)     Types: Cigarettes     Start date: 1/1/2008     Passive exposure: Never    Smokeless tobacco: Never   Substance and Sexual Activity    Alcohol use: Never    Drug use: Never    Sexual activity: Yes     Partners: Female, Male     Birth control/protection: None     Social Drivers of Health     Financial Resource Strain: High Risk (7/10/2024)    Received from [facility]    Overall Financial Resource Strain (CARDIA)     Difficulty of Paying Living Expenses: Hard   Food Insecurity: No Food " Insecurity (7/10/2024)    Received from [facility]    Hunger Vital Sign     Worried About Running Out of Food in the Last Year: Never true     Ran Out of Food in the Last Year: Never true   Transportation Needs: Unmet Transportation Needs (7/10/2024)    Received from [facility]    PRAPARE - Transportation     Lack of Transportation (Medical): Yes     Lack of Transportation (Non-Medical): Yes   Physical Activity: Inactive (7/10/2024)    Received from [facility]    Exercise Vital Sign     Days of Exercise per Week: 0 days     Minutes of Exercise per Session: 0 min   Stress: Stress Concern Present (7/10/2024)    Received from [facility]    Citizen of [place] of Occup[employer] - Occupational Stress Questionnaire     Feeling of Stress : Very much   Housing Stability: Unknown (5/24/2022)    Housing Stability Vital Sign     Unable to Pay for Housing in the Last Year: No     Unstable Housing in the Last Year: No         10        MEDICATIONS & ALLERGIES:     Current Outpatient Medications on File Prior to Visit   Medication Sig    acetaminophen (TYLENOL EXTRA STRENGTH) 500 MG tablet Take 500 mg by mouth every 8 (eight) hours as needed.    albuterol (PROVENTIL HFA) 90 mcg/actuation inhaler Inhale 2 puffs into the lungs every 6 (six) hours as needed for Wheezing (cough). Rescue    ALPRAZolam (XANAX) 1 MG tablet Take 1 mg by mouth 2 (two) times daily as needed.    dicyclomine (BENTYL) 20 mg tablet Take 1 tablet (20 mg total) by mouth every 6 (six) hours.    DULoxetine (CYMBALTA) 60 MG capsule Take 60 mg by mouth once daily.    ibuprofen (ADVIL,MOTRIN) 600 MG tablet 400 mg.    ketorolac (TORADOL) 10 mg tablet Take 1 tablet (10 mg total) by mouth 3 (three) times daily as needed for Pain.    methIMAzole (TAPAZOLE) 5 MG Tab Take 1/2 tablet a day    ondansetron (ZOFRAN) 4 MG tablet Take 1 tablet (4 mg total) by mouth every 8 (eight) hours as needed for Nausea.    propranoloL (INDERAL) 10 MG tablet Take 10 mg by mouth Daily.  "    Current Facility-Administered Medications on File Prior to Visit   Medication    albuterol-ipratropium 2.5 mg-0.5 mg/3 mL nebulizer solution 3 mL    HYDROmorphone injection 0.2 mg    HYDROmorphone injection 0.5 mg    lactated ringers infusion    LIDOcaine (PF) 10 mg/ml (1%) injection 10 mg       Review of patient's allergies indicates:   Allergen Reactions    Benadryl [diphenhydramine hcl] Anxiety     Hallucinations     Diphenhydramine Anxiety and Other (See Comments)       OBJECTIVE:   Vital Signs:  There were no vitals filed for this visit.    No results found for this or any previous visit (from the past 24 hours).      Physical Exam  General - Appears comfortable, appropriatley conversive   Mental Status - alert and oriented x 3, speaking in logical, relevant sentences   HEENT - no rhinorrhea   Cardiac - RRR, no murmurs, rubs, or gallops; no edema in LE   Respiratory - breathing comfortably; clear to ascultation bilaterally   Abdominal - nondistended, soft, mildly tender in upper abdomen bilaterally  Extremities - LE, UE, and joints are nonerythematous and nonswollen   Skin - no rashes or bruises seen on skin      Laboratory  Lab Results   Component Value Date    WBC 8.20 02/22/2025    HGB 11.9 (L) 02/22/2025    HCT 37.3 02/22/2025    MCV 90.3 02/22/2025     02/22/2025     @[id number](GLU,NA,K,Cl,CO2,BUN,Creatinine,Calcium,MG)@  Lab Results   Component Value Date    INR 0.96 04/04/2018     Lab Results   Component Value Date    HGBA1C 4.8 01/19/2023     No results for input(s): "POCTGLUCOSE" in the last 72 hours.      ASSESSMENT & PLAN:     Graves disease  Multinodular goiter   Palpitations   -following with endocrinology, last seen 12/2024  -Soft tissue US 7/31/2023: There is heterogenicity in increased vascularity of the thyroid within the right hemithyroid there is a 6 mm x 3 mm x 6 mm and a 5 mm x 3 mm x 3 mm new nodule both of these nodules are subcentimeter and do not meet criteria for biopsy " and or surveillance there appear to be stable as compared with the previous exam.  -continue methimazole 2.5mg and propranolol 10mg per endocrine recs   -continue scheduled f/u   -TSH and T4 wnl      Anxiety/depression   -following with outside psychiatrist      Chronic abdominal pain   -Patient has had multiple imaging modalities performed over the last year with no significant explanation for her symptoms   -gastric emptying study performed 3/28/2026 which was inconclusive. Had small capsule endoscopy scheduled at [facility]  -For GI, patient now following with Dr. Foley in [place] last seen 10/2024  -US showed elevated celiac artery velocities. GI obtaining a CTA A/P w/ breathing protocol to check for MALS (median arcuate ligament syndrome)  -Will consider 24-hour urine metanephrines, 24-hr urine 5-HIAA levels. However, I suspect her SNRI will interfere with the metanephrine testing.  -prior lab work performed unremarkable for abnormalities.    -has been evaluated by GYN (Dr. Donaldson 8/2024 - suggested differentials of pelvic vascular congestion, possible endometriosis, constipation, pelvic floor muscle spasms)(recommended pelvic floor PT, interstitial cystitis diet, miralax trial, and consideration for diagnostic laparoscopy).  -Has been evaluated by vascular surgery, cardiology, and GI in the past per patient with no further recommendations.    -this appears to be a complicated situation that has had extensive workup performed in the past. Unclear etiology at this time.  Will continue to monitor.     POTS?  Orthostatic hypotension  -follows Dr. Adames with [facility] - obtained records in  - scanned in on 3/11 - No POTS seen in note. Dx of mild tricuspid regurgitation - plans for treadmill stress test and Holter monitor  -given sx of flushing, hypertensive episodes, consider urine metanephrines and 5-HIAA levels. SNRI may interfere with testing though.    Healthcare Maintenance:     Cancer  Screening  #Cervical Cancer: ASCUS on PAP 8/2024    Vaccinations:  Immunization History   Administered Date(s) Administered    DTP 1995, 1995, 1995    DTaP 07/05/1995, 07/06/1999    HIB 1995, 1995    HPV 9-Valent 01/15/2009, 03/06/2009, 07/07/2009    HPV Quadrivalent 01/15/2009, 03/06/2009, 07/07/2009    Hepatitis B, Pediatric/Adolescent 1995, 1995, 1995    Hib-HbOC 1994, 07/05/1995    MMR 08/08/1995, 07/06/1999    Meningococcal Conjugate (MCV4P) 04/03/2006    OPV 1995, 1995, 1994, 07/06/1999    Td (ADULT) 04/03/2006    Varicella 04/03/2006          RTC as scheduled in May with PCP    > 60 minutes spent on visit    Gilberto Laguna MD  Internal Medicine  [facility]

## 2025-03-06 ENCOUNTER — TELEPHONE (OUTPATIENT)
Dept: GASTROENTEROLOGY | Facility: CLINIC | Age: 30
End: 2025-03-06
Payer: MEDICAID

## 2025-03-06 NOTE — TELEPHONE ENCOUNTER
----- Message from Med Assistant Jimenes sent at 3/6/2025  9:49 AM CST -----  Regarding: FW: specialized breathong for test tomorrow AM - pls advise  Contact: Patient can be contacted @# [phone]    ----- Message -----  From: Claribel Heller  Sent: 3/6/2025   9:46 AM CST  To: Og Macias Staff  Subject: specialized breathong for test tomorrow AM -#    PT called needing to discuss her needing her test for tomorrow. Please reach out to advise where she is scheduled of the specialized breathing stuff? She needs for the test. Test is sched first thing tomorrow. Please reach out to facility or PT to advisePatient can be contacted @# [phone]

## 2025-03-06 NOTE — TELEPHONE ENCOUNTER
Spoke with patient earlier to discuss the imaging for tomorrow. Due to her conditions she was concerned the instructions for the CTA was not clear for the staff. Imaging department was contacted at the [facility] to review the instructions. They read the instructions that are on the order for the CTA that is specific regarding testing during inspiration and exhalation. Follow up message left for patient that the orders are confirmed to be on the request already and was read back.

## 2025-03-07 ENCOUNTER — HOSPITAL ENCOUNTER (OUTPATIENT)
Dept: RADIOLOGY | Facility: HOSPITAL | Age: 30
Discharge: HOME | End: 2025-03-07
Attending: INTERNAL MEDICINE
Payer: MEDICAID

## 2025-03-07 DIAGNOSIS — R10.13 EPIGASTRIC PAIN: ICD-10-CM

## 2025-03-07 PROCEDURE — 74175 CTA ABDOMEN W/CONTRAST: CPT | Mod: TC

## 2025-03-07 PROCEDURE — 25500020 PHARM REV CODE 255

## 2025-03-07 RX ADMIN — IOHEXOL 100 ML: 350 INJECTION, SOLUTION INTRAVENOUS at 12:03

## 2025-03-10 ENCOUNTER — TELEPHONE (OUTPATIENT)
Dept: INTERNAL MEDICINE | Facility: CLINIC | Age: 30
End: 2025-03-10
Payer: MEDICAID

## 2025-03-10 NOTE — TELEPHONE ENCOUNTER
"DR. CASTELLANOS,       MS. JIMENEZ CALLED ASKING IF YOU WILL ORDER LABS BASED ON HER LAST OFFICE VISIT.  SHE STATED THAT YOU SAID YOU HAD TO "LOOK A FEW THINGS UP."     IF LABS ARE NEEDED PLEASE PLACE ORDERS IN CHART.  THANK YOU.    "

## 2025-03-11 ENCOUNTER — OFFICE VISIT (OUTPATIENT)
Dept: INTERNAL MEDICINE | Facility: CLINIC | Age: 30
End: 2025-03-11
Payer: MEDICAID

## 2025-03-11 VITALS
OXYGEN SATURATION: 98 % | WEIGHT: 111.19 LBS | RESPIRATION RATE: 18 BRPM | DIASTOLIC BLOOD PRESSURE: 78 MMHG | HEIGHT: 60 IN | BODY MASS INDEX: 21.83 KG/M2 | HEART RATE: 104 BPM | TEMPERATURE: 98 F | SYSTOLIC BLOOD PRESSURE: 112 MMHG

## 2025-03-11 DIAGNOSIS — R10.11 ABDOMINAL PAIN, CHRONIC, RIGHT UPPER QUADRANT: ICD-10-CM

## 2025-03-11 DIAGNOSIS — R00.0 TACHYCARDIA: ICD-10-CM

## 2025-03-11 DIAGNOSIS — G89.29 ABDOMINAL PAIN, CHRONIC, RIGHT UPPER QUADRANT: ICD-10-CM

## 2025-03-11 DIAGNOSIS — R23.2 FACIAL FLUSHING: Primary | ICD-10-CM

## 2025-03-11 PROCEDURE — 99215 OFFICE O/P EST HI 40 MIN: CPT | Mod: PBBFAC

## 2025-03-11 NOTE — PROGRESS NOTES
INTERNAL MEDICINE CLINIC  CLINIC NOTE    Patient Name: Royce Quigley  YOB: 1995    PRESENTING HISTORY       History of Present Illness:  Ms. Royce Quigley is a 30 y.o. female w/ an active medical problem list including Grave's disease, chronic abdominal pain, and depression/anxiety who is presenting to [facility] for follow up visit. She is Dr. Dhaliwal's primary care patient but will be seeing me today for her follow up visit.     10/2023: Per patient she has been seen by several specialists in the past including GI, cardiology, and vascular surgery with no resolution in symptoms.  She reports chronic abdominal pain over the last 3 years mostly located in her left upper quadrant and epigastric region associated with swelling and episodes of severe pain.  She has had multiple studies over the past year including CT abdomen/pelvis, CTA abdomen/pelvis, and abdominal US with no reported change.  She has a history of an exploratory laparotomy where she reportedly had a hemorrhage of an ovarian cyst requiring evacuation of a large amount of intraperitoneal fluid.   She also reports being diagnosed with POTS and nutcracker syndrome in the past but denies any treatment.  She also states she has a history of endocarditis for which she previously completed antibiotics for and was following with cardiology outpatient. She is also endorsing a 10 pound weight loss over the last 3 years with an inability to gain weight.  She reports abnormal bowel movements that occur daily.  She reports current abdominal pain 6/10.  She received EGD from Dr. Galindo earlier in the year with results showing esophagitis but no other abnormalities noted on report. She has been on several medications for abdominal pain including PPI's, pepcid, zofran, sucralfate with no resolution in symptoms.  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   She follows with psychiatrist for related issues and is currently on xanax PRN as well as duloxetine.       4/11/2024:  Patient here for follow-up.  Continues to endorse LUQ abdominal pain.  Similar complaints as discussed at prior visit as above.  Is following with new GI physician, Dr. Foley.  Recently had a gastric emptying study done that was inconclusive.  Per patient continuing workup with capsule endoscopy that she is working on rescheduling. Otherwise, patient reporting no new acute complaints.  States she feels she may experience constipation at times as well as bloating and loose stools but mostly reports abdominal pain and swelling in LUQ.  Continues to follow with psychiatrist and counselor for related issues.  Endorses compliance with medications.     03/5/25  Ms. Quigley continues to complain of the above symptoms. Mentions episodes of flushing, abdominal/chest/back pain, and elevated BP's. Pain is not related to exertion and is not post-prandial. Of note, after she endorsed her symptoms of flushing and elevated BP's, she asked specifically about neuroendocrine tumors. When saying that I could order serotonin or metanephrine levels, she asked if that was for carcinoid or pheochromocytoma respectively. She alternates between diarrhea/constipation but more often has loose stools. Is following with her GI doctor and being worked up for median arcuate ligament syndrome. Following with her cardiologist as well, Dr. Adames.     3/11/2025:  Patient is here for follow-up despite recent appt approx 1 week ago.  Reports her chronic symptoms were not addressed at that time. Continues to endorse abdominal/chest/back pain and similar symptoms as above.  No new or acute complaints today.     Endo - managing methimazole  GI - working up for MALS - patient reports her current GI doctor informed her that he has nothing else to offer her at this time. Patient is interested in seeing advanced GI practitioner.   Ob-Gyn -  "Chronic pelvic pain (intersitial cystitis, possible endometriosis, constipation, pelvic floor muscle spasm)  -miralax, water intake, increased activity  -intersitial cystitis diet  -pelvic floor PT  -endometriosis (f/u ob-gyn)    Psychiatry f/u (does she have anxiety?)    Prior cardiology and POTS?? Prinzmetal    Review of Systems   Constitutional:  Positive for weight loss. Negative for fever.   Gastrointestinal:  Positive for abdominal pain, constipation, diarrhea and nausea. Negative for melena and vomiting.   Genitourinary:  Positive for dysuria. Negative for frequency and hematuria.   Musculoskeletal:  Positive for myalgias.   Neurological:  Positive for headaches.     As above    PAST HISTORY:     Past Medical History:   Diagnosis Date    Anxiety disorder, unspecified     Depression     Essential fatty acid deficiency     Gastric motility disorder     Graves disease     History of ovarian cyst     frequent, reoccurring with hx of rupture and hemorrhage    Iron deficiency anemia, unspecified     Pelvic congestion syndrome     Poor dentition     POTS (postural orthostatic tachycardia syndrome)     Smoker     Vitamin D deficiency        Past Surgical History:   Procedure Laterality Date    CHOLECYSTECTOMY      DX-LAP, EVACUATION HEMOPERITONEUM  11/30/2021    GALLBLADDER SURGERY      LAPAROSCOPIC CHOLECYSTECTOMY N/A 10/10/2022    Procedure: CHOLECYSTECTOMY, LAPAROSCOPIC;  Surgeon: Tobin Aguiar MD;  Location: [facility];  Service: General;  Laterality: N/A;    TONSILLECTOMY  11/2011       Family History   Problem Relation Name Age of Onset    Heart disease Paternal Grandfather Dany Aryan II     Cancer Paternal Grandfather Dany Aryan II     Stroke Paternal Grandfather Dany Aryan II     Heart disease Paternal Grandmother Sophie Baeza     Pancreatic cancer Maternal Grandmother Yahaira "Casi" Nadira     Ovarian cancer Maternal Grandmother Yahaira "Casi" Nadira     Cancer Maternal Grandmother Yahaira "Casi" " Nadira     Heart disease Father Dany Baeza III     Heart attack Father Dany Baeza III     Coronary artery disease Father Dany Baeza III     Cancer Mother Priscilla Janene         colorectal cancer    Bipolar disorder Mother Priscilla Janene     Irritable bowel syndrome Mother Priscilla Jannee     No Known Problems Brother      Kidney disease Sister      No Known Problems Son 2     Stroke Paternal Aunt Emma Baeza     Stroke Maternal Aunt Sara Warner        Social History     Socioeconomic History    Marital status: Significant Other   Tobacco Use    Smoking status: Every Day     Current packs/day: 1.50     Average packs/day: 1.3 packs/day for 31.2 years (39.8 ttl pk-yrs)     Types: Cigarettes     Start date: 1/1/2008     Passive exposure: Never    Smokeless tobacco: Never   Substance and Sexual Activity    Alcohol use: Never    Drug use: Never    Sexual activity: Yes     Partners: Female, Male     Birth control/protection: None     Social Drivers of Health     Financial Resource Strain: Low Risk  (3/11/2025)    Overall Financial Resource Strain (CARDIA)     Difficulty of Paying Living Expenses: Not very hard   Food Insecurity: No Food Insecurity (3/11/2025)    Hunger Vital Sign     Worried About Running Out of Food in the Last Year: Never true     Ran Out of Food in the Last Year: Never true   Transportation Needs: Unmet Transportation Needs (3/11/2025)    PRAPARE - Transportation     Lack of Transportation (Medical): Yes     Lack of Transportation (Non-Medical): Yes   Physical Activity: Inactive (3/11/2025)    Exercise Vital Sign     Days of Exercise per Week: 0 days     Minutes of Exercise per Session: 0 min   Stress: Stress Concern Present (3/11/2025)    [employer] - Occupational Stress Questionnaire     Feeling of Stress : Very much   Housing Stability: Low Risk  (3/11/2025)    Housing Stability Vital Sign     Unable to Pay for Housing in the Last Year: No     Number of Times Moved in the  Last Year: 0     Homeless in the Last Year: No         MEDICATIONS & ALLERGIES:     Current Outpatient Medications on File Prior to Visit   Medication Sig    acetaminophen (TYLENOL EXTRA STRENGTH) 500 MG tablet Take 500 mg by mouth every 8 (eight) hours as needed.    ALPRAZolam (XANAX) 1 MG tablet Take 1 mg by mouth 2 (two) times daily as needed.    dicyclomine (BENTYL) 20 mg tablet Take 1 tablet (20 mg total) by mouth every 6 (six) hours.    DULoxetine (CYMBALTA) 60 MG capsule Take 60 mg by mouth once daily.    ibuprofen (ADVIL,MOTRIN) 600 MG tablet 400 mg every 8 (eight) hours as needed.    methIMAzole (TAPAZOLE) 5 MG Tab Take 1/2 tablet a day    ondansetron (ZOFRAN) 4 MG tablet Take 1 tablet (4 mg total) by mouth every 8 (eight) hours as needed for Nausea.    propranoloL (INDERAL) 10 MG tablet Take 10 mg by mouth Daily.    albuterol (PROVENTIL HFA) 90 mcg/actuation inhaler Inhale 2 puffs into the lungs every 6 (six) hours as needed for Wheezing (cough). Rescue (Patient not taking: Reported on 3/5/2025)    ketorolac (TORADOL) 10 mg tablet Take 1 tablet (10 mg total) by mouth 3 (three) times daily as needed for Pain.     Current Facility-Administered Medications on File Prior to Visit   Medication    [DISCONTINUED] albuterol-ipratropium 2.5 mg-0.5 mg/3 mL nebulizer solution 3 mL    [DISCONTINUED] HYDROmorphone injection 0.2 mg    [DISCONTINUED] HYDROmorphone injection 0.5 mg    [DISCONTINUED] lactated ringers infusion    [DISCONTINUED] LIDOcaine (PF) 10 mg/ml (1%) injection 10 mg       Review of patient's allergies indicates:   Allergen Reactions    Benadryl [diphenhydramine hcl] Anxiety     Hallucinations     Diphenhydramine Anxiety and Other (See Comments)       OBJECTIVE:   Vital Signs:  Vitals:    03/11/25 1410   BP: 112/78   Pulse: 104   Resp: 18   Temp: 98.2 °F (36.8 °C)   TempSrc: Oral   SpO2: 98%   Weight: 50.4 kg (111 lb 3.2 oz)   Height: 5' (1.524 m)       No results found for this or any previous visit  (from the past 24 hours).      Physical Exam  General - Appears comfortable, appropriatley conversive   Mental Status - alert and oriented x 3, speaking in logical, relevant sentences   HEENT - no rhinorrhea   Cardiac - RRR, no murmurs, rubs, or gallops; no edema in LE   Respiratory - breathing comfortably; clear to ascultation bilaterally   Abdominal - nondistended, soft, mildly tender in upper abdomen bilaterally  Extremities - LE, UE, and joints are nonerythematous and nonswollen   Skin - no rashes or bruises seen on skin      Labs:  CMP:   Lab Results   Component Value Date    GLUCOSE 96 02/22/2025    CALCIUM 9.4 02/22/2025    ALBUMIN 4.0 02/22/2025     02/22/2025    K 4.1 02/22/2025    CO2 25 02/22/2025     02/22/2025    BUN 7.8 02/22/2025    CREATININE 0.68 02/22/2025    ALKPHOS 44 02/22/2025    ALT 9 02/22/2025    AST 11 02/22/2025    BILITOT 0.2 02/22/2025      CBC:   Lab Results   Component Value Date    WBC 8.20 02/22/2025    HGB 11.9 (L) 02/22/2025    HCT 37.3 02/22/2025    MCV 90.3 02/22/2025    RDW 14.1 02/22/2025     FLP:   Lab Results   Component Value Date    CHOL 128 12/03/2021    HDL 44 12/03/2021    LDL 72.00 12/03/2021    TRIG 58 12/03/2021    TOTALCHOLEST 3 12/03/2021     Coagulation:   Lab Results   Component Value Date    INR 0.96 04/04/2018     DM:   Lab Results   Component Value Date    HGBA1C 4.8 01/19/2023    EAG 91.1 01/19/2023    CREATININE 0.68 02/22/2025     Thyroid:   Lab Results   Component Value Date    TSH 4.442 12/09/2024    COCZEQ8CJBI 0.93 12/09/2024    V1BOJTK 7.5 08/29/2019     LFTs:   Lab Results   Component Value Date    LABPROT 7.4 02/22/2025    ALBUMIN 4.0 02/22/2025    AST 11 02/22/2025    ALT 9 02/22/2025    ALKPHOS 44 02/22/2025     Anemia:   Lab Results   Component Value Date    VQVYMHIR20 529 02/05/2018    FOLATE 10.6 02/05/2018        ASSESSMENT & PLAN:     Graves disease  Multinodular goiter   Palpitations   -following with endocrinology, last seen  12/2024  -Soft tissue US 7/31/2023: There is heterogenicity in increased vascularity of the thyroid within the right hemithyroid there is a 6 mm x 3 mm x 6 mm and a 5 mm x 3 mm x 3 mm new nodule both of these nodules are subcentimeter and do not meet criteria for biopsy and or surveillance there appear to be stable as compared with the previous exam.  -continue methimazole 2.5mg and propranolol 10mg per endocrine recs   -continue scheduled f/u   -TSH and T4 wnl 12/2024      Anxiety/depression   -per patient following with outside psychiatrist      Chronic abdominal pain   -Patient has had multiple imaging modalities performed over the last year with no significant explanation for her symptoms   -gastric emptying study performed 3/28/2026 which was inconclusive. Had small capsule endoscopy scheduled at [facility]  -For GI, patient now following with Dr. Foley in [place] last seen 10/2024  -US showed elevated celiac artery velocities. GI obtained CTA A/P w/ breathing protocol to check for MALS (median arcuate ligament syndrome) which showed no significant celiac artery narrowing.   -previously ordered 24-hour urine metanephrines, 24-hr urine 5-HIAA levels which patient did not perform.  Can consider re-ordering, however, I suspect her SNRI will interfere with the metanephrine testing.  -prior lab work performed unremarkable for abnormalities.    -has been evaluated by GYN (Dr. Donaldson 8/2024 - suggested differentials of pelvic vascular congestion, possible endometriosis, constipation, pelvic floor muscle spasms)(recommended pelvic floor PT, interstitial cystitis diet, miralax trial, and consideration for diagnostic laparoscopy).  -Has been evaluated by vascular surgery, cardiology, and GI in the past per patient with no further recommendations.    -this appears to be a complicated situation that has had extensive workup performed in the past. Unclear etiology at this time.  Will continue to monitor.  -referring to new  GI physician today per patient request.      POTS?  Orthostatic hypotension  -follows Dr. Adames with [facility] - obtaining records  -given sx of flushing, hypertensive episodes, ordering urine metanephrines and 5-HIAA levels. SNRI may interfere with testing though and patient counseled on this possibility.      Healthcare Maintenance:  Cancer Screening  #Cervical Cancer: ASCUS on PAP 8/2024    Vaccinations:  Immunization History   Administered Date(s) Administered    DTP 1995, 1995, 1995    DTaP 07/05/1995, 07/06/1999    HIB 1995, 1995    HPV 9-Valent 01/15/2009, 03/06/2009, 07/07/2009    HPV Quadrivalent 01/15/2009, 03/06/2009, 07/07/2009    Hepatitis B, Pediatric/Adolescent 1995, 1995, 1995    Hib-HbOC 1994, 07/05/1995    MMR 08/08/1995, 07/06/1999    Meningococcal Conjugate (MCV4P) 04/03/2006    OPV 1995, 1995, 1995, 07/06/1999    Td (ADULT) 04/03/2006    Varicella 04/03/2006      RTC in 4 months     Courtney Dhaliwal MD  [employer] [facility], PGY-3  Answers submitted by the patient for this visit:  Abdominal Pain Questionnaire (Submitted on 3/11/2025)  Chief Complaint: Abdominal pain  Chronicity: chronic  Onset: more than 1 year ago  Onset quality: gradual  Frequency: constantly  Episode duration: 6 Hours  Progression since onset: gradually worsening  Pain location: LUQ, epigastric region  Pain - numeric: 6/10  Pain quality: aching, cramping, a sensation of fullness, colicky  anorexia: Yes  arthralgias: Yes  belching: Yes  flatus: Yes  hematochezia: No  Aggravated by: deep breathing, eating  Relieved by: nothing  Diagnostic workup: CT scan  Pain severity: severe  Treatments tried: acetaminophen, antacids, antibiotics, H2 blockers, proton pump inhibitors  Improvement on treatment: no relief  abdominal surgery: Yes  colon cancer: No  Crohn's disease: No  gallstones: No  GERD: Yes  irritable bowel syndrome: No  kidney stones: No  pancreatitis:  No  PUD: No  ulcerative colitis: No  UTI: Yes

## 2025-03-11 NOTE — PROGRESS NOTES
I have reviewed and agree with the resident's findings, including all diagnostic interpretations and plans as written.    Love Raymond MD

## 2025-03-12 ENCOUNTER — RESULTS FOLLOW-UP (OUTPATIENT)
Dept: GASTROENTEROLOGY | Facility: HOSPITAL | Age: 30
End: 2025-03-12

## 2025-03-13 ENCOUNTER — RESULTS FOLLOW-UP (OUTPATIENT)
Dept: ENDOCRINOLOGY | Facility: CLINIC | Age: 30
End: 2025-03-13

## 2025-03-13 ENCOUNTER — LAB VISIT (OUTPATIENT)
Dept: LAB | Facility: HOSPITAL | Age: 30
End: 2025-03-13
Attending: NURSE PRACTITIONER
Payer: MEDICAID

## 2025-03-13 DIAGNOSIS — E05.00 GRAVES DISEASE: ICD-10-CM

## 2025-03-13 LAB
EST. AVERAGE GLUCOSE BLD GHB EST-MCNC: 91.1 MG/DL
HBA1C MFR BLD: 4.8 %

## 2025-03-13 PROCEDURE — 36415 COLL VENOUS BLD VENIPUNCTURE: CPT

## 2025-03-13 PROCEDURE — 83036 HEMOGLOBIN GLYCOSYLATED A1C: CPT

## 2025-03-14 ENCOUNTER — PATIENT MESSAGE (OUTPATIENT)
Dept: INTERNAL MEDICINE | Facility: CLINIC | Age: 30
End: 2025-03-14
Payer: MEDICAID

## 2025-03-19 ENCOUNTER — TELEPHONE (OUTPATIENT)
Dept: INTERNAL MEDICINE | Facility: CLINIC | Age: 30
End: 2025-03-19
Payer: MEDICAID

## 2025-03-19 NOTE — TELEPHONE ENCOUNTER
Please let patient know that I do not think there is any utility in checking a complete abdominal US at this time given that she recently had a CTA which showed no abnormalities unless she has any new abdominal symptoms that are different than her chronic complaints. Can discuss this further with GI physician if she would like.

## 2025-03-24 RX ORDER — METHIMAZOLE 5 MG/1
TABLET ORAL
Qty: 15 TABLET | Refills: 2 | Status: SHIPPED | OUTPATIENT
Start: 2025-03-24

## 2025-04-14 ENCOUNTER — PATIENT MESSAGE (OUTPATIENT)
Dept: ENDOCRINOLOGY | Facility: CLINIC | Age: 30
End: 2025-04-14

## 2025-04-14 ENCOUNTER — OFFICE VISIT (OUTPATIENT)
Dept: ENDOCRINOLOGY | Facility: CLINIC | Age: 30
End: 2025-04-14
Payer: MEDICAID

## 2025-04-14 VITALS — DIASTOLIC BLOOD PRESSURE: 69 MMHG | SYSTOLIC BLOOD PRESSURE: 104 MMHG

## 2025-04-14 DIAGNOSIS — E04.2 MULTINODULAR GOITER: ICD-10-CM

## 2025-04-14 DIAGNOSIS — R00.2 PALPITATIONS: ICD-10-CM

## 2025-04-14 DIAGNOSIS — E05.00 GRAVES DISEASE: Primary | ICD-10-CM

## 2025-04-14 PROCEDURE — 3074F SYST BP LT 130 MM HG: CPT | Mod: CPTII,95,, | Performed by: NURSE PRACTITIONER

## 2025-04-14 PROCEDURE — 98005 SYNCH AUDIO-VIDEO EST LOW 20: CPT | Mod: 95,,, | Performed by: NURSE PRACTITIONER

## 2025-04-14 PROCEDURE — 1160F RVW MEDS BY RX/DR IN RCRD: CPT | Mod: CPTII,95,, | Performed by: NURSE PRACTITIONER

## 2025-04-14 PROCEDURE — 1159F MED LIST DOCD IN RCRD: CPT | Mod: CPTII,95,, | Performed by: NURSE PRACTITIONER

## 2025-04-14 PROCEDURE — 3044F HG A1C LEVEL LT 7.0%: CPT | Mod: CPTII,95,, | Performed by: NURSE PRACTITIONER

## 2025-04-14 PROCEDURE — 3078F DIAST BP <80 MM HG: CPT | Mod: CPTII,95,, | Performed by: NURSE PRACTITIONER

## 2025-04-14 NOTE — PROGRESS NOTES
Subjective     Patient ID: Royce Quigley is a 30 y.o. female.    Chief Complaint: Graves' Disease    0/15/2020 Telemedicine Visit Note- Endocrine Clinic: 26 y/o female schedule for endocrine clinic F/U for Graves disease. Pt is 7 weeks post-partum and currently on MMI 5mg daily. TSH 0.043 Free T4 1.20 T4 9.1.  Patient reports a couple of weeks after pregnancy and she felt like she was hyperthyroid and had an old prescription of MMI at home and started on 5 mg, then she states 3 weeks ago she went to her primary care provider and was prescribed a new prescription of MMI 5 mg.  She states she has been on the new prescription that is not  for approximately 3 weeks.  She reports that her symptoms of palpitations, tremors and anxiety have slightly improved but she still continues with palpitations, weight loss.     2021 Endocrine Clinic Note: 26 year old female scheduled today's endocrine clinic follow-up for Graves' disease.  Patient was previously in remission with pregnancy and delivered 2020.  4 to 5 weeks after pregnancy patient felt like she was returning to hyperthyroidism and had restarted her MMI. Last documentation 2020 patient's TSH was 10.521 MMI 10 mg twice a day was reduced to once a day. She is currently MMI 5mg (1/2 of 10mg tablet), She reports she was seeing a health care provider in [place] who changed her medication. Pt states she feel speedy now, with palpitations, anxiety. Pt weight has increased. Will check labs today. (1)     2021 Endocrine Clinic Note: 26 year old female scheduled today for Endocrine follow-up.  History of Graves' disease/palpitations.  Patient has continued her MMI 5 mg and states when she ran out she had an old prescription that was 2-3 years old that she restarted she states she is severely worried about going back into hyperthyroidism.  Current labs TSH 0.855, free T4 1.0015 mg of MMI.  Discussed with patient holding MMI to assess for  remission patient states that she has a great fear from returning to hyperthyroidism and she is currently symptomatic with weight loss and palpitations and does not want to stop her MMI.  Instructed patient we will decrease to a half of a tablet and recheck labs in 3 weeks and to reconsider holding MMI.  Tobacco use patient smokes half a pack per day encourage cessation. (1)     3/15/2022 endocrine clinic note: 27-year-old female scheduled today for endocrine clinic follow-up. History of Graves' disease/palpitations. Graves' disease patient is currently in MMI 5 mg most recent TSH On 01/25/2022 TSH 2.2292.  Patient reports continued palpitations, excessive sweating and reports weight loss.  Patient also has a history of anxiety.  Patient is currently undergoing a cardiac work-up for palpitations and also was referred to gastro for an upper scope but has not completed yet. Enlarged thyroid multinodular goiter patient had ultrasound 6/28/2019 IMPRESSION: Heterogenicity in hypervascularity of the thyroid gland might be related to thyroid disease. Subcentimeter nodules as above do not meet criteria for biopsy.      Endocrine Clinic Note 07/19/2022: 27-year-old female scheduled today for endocrine clinic follow-up. History of Graves disease, multinodular goiter, enlarged thyroid, palpitations. Previous TSH 2.2292 on 01/25/2022 patient was previously held off MI and reported symptoms of palpitation, increased anxiety and tremors. Currently she is on MMI 5 mg. Palpitations patient reports occasional palpitations she states when she went to her PCP 1 week ago her  today patient's heart rate is 92 patient states compliance with her propanolol. Multinodular goiter/enlarged thyroid repeat ultrasound 03/23/2022 Mild diffuse heterogeneity and hypervascularity at the thyroid gland suggesting diffuse thyroiditis with several small benign sub centimeter nodules as detailed above. No significant interval change compared to the  thyroid ultrasound in 2019 aside from interval decrease in size of the left thyroid nodule.  Patient denies any new palpable nodules but does report mild dysphagia at times. Tobacco use patient is smoking 1 pack per day states she does not want states patient's program at this time she states she is not want to quit due to her anxiety.     Endocrine Clinic Note 01/19/2023:  27-year-old female scheduled today for endocrine clinic follow-up.  History of Graves disease, multinodular goiter and enlarged thyroid, palpitations. TSH 2.0166, Free T3 2.98, Free T4 0.91 on 07/19/2022 patient states when MMI stops she continue with palpitations and extreme anxiety increased symptoms of hyperthyroidism.  Patient remained on MMI 5 mg reports to clinic today and reports occasional palpitations and anxiety.  Patient's low blood pressure today for took a Xanax just prior to her visit along with propranolol 10 mg last night.  Patient currently being followed by cardiologist.  Multinodular goiter repeat ultrasound 03/23/2022 IMPRESSION: Mild diffuse heterogeneity and hypervascularity at the thyroid gland suggesting diffuse thyroiditis with several small benign subcentimeter nodules as detailed above. No significant interval change compared to the thyroid ultrasound in 2019 aside from interval decrease in size of the left thyroid nodule.  Patient is concerned developing type 1 diabetes for grandmother has a history of type 1 diabetes and discussing today like she is pancreatic insufficiency.  Patient's recent glucose of 139 previous glucoses were within normal limits.      Endocrine clinic note 07/24/2023:  28-year-old female day endocrine clinic follow-up.  History of Graves disease, multinodular goiter enlarged thyroid, palpitations.  TSH 2.769, Free T4 1.00, Free T3 3.05 on 01/19/2023.  Patient is currently on MMI 5 mg Patient states when dose is lowered she has symptoms of palpitations symptoms of hyperthyroidism.  Multinodular  goiter ultrasound 03/23/2023 IMPRESSION: Mild diffuse heterogeneity and hypervascularity at the thyroid gland suggesting diffuse thyroiditis with several small benign subcentimeter nodules as detailed above. No significant interval change compared to the thyroid ultrasound in 2019 aside from interval decrease in size of the left thyroid nodule.  Patient denies any palpable nodules but she does note palpable lymph node close to her right ear that has been palpable she states for over a year nontender.       Telemedicine Notes:  Endocrine clinic note 11/28/2023:  28-year-old female scheduled today for endocrine clinic follow-up.  History of Graves disease, multinodular goiter with enlarged thyroid and palpitations. Graves disease pt is on MMI 2.5 mg previous TSH 4.623, free T3 2.87, Free T4 0.79 on 07/24/2023 MMI reduced to half a tablet at that time.  Attempted to DC methimazole of increased anxiety and palpitations once off methimazole.  Pt takes Xanax for her anxiety.  Patient reports enlarged thyroid which is comfortable she states she is considering a thyroidectomy but wants to wait until her stomach issues resolved.  Discussed with the patient if she desires thyroidectomy we will hold MMI to see if she goes hyperthyroid before thyroidectomy can be done.        Telemedicine Notes:  Endocrine clinic note 03/05/2024:  28 year female scheduled today for endocrine clinic follow-up.  History of Graves disease and autoimmune thyroiditis palpitations and history of anxiety.  Patient was on methimazole 2.5 mg Free T4 0.79 Free T3 3.03 on 02/28/2024, TPO >1.543, TSI 1.1, Trab 3.40 on 02/28/2024.  Patient reports she continues with anxiety whether related thyroid has a history of anxiety.  Patient does not report any weight changes.  She does report fatigue.       Telemedicine Notes:  Endocrine clinic note 06/11/2024:  29-year-old female scheduled today for endocrine clinic follow-up.  History of Graves disease and  autoimmune thyroiditis with palpitations and history of anxiety.  Patient is currently on methimazole 2.5 mg previous labs Free T4 0.79 Free T3 3.03 on 02/28/2024, TPO >1.543, TSI 1.1, Trab 3.40 on 02/28/2024.  Patient had labs in the system to complete put has limitations with transportation was unable to complete.  Patient on today's visit had a positive screening for depression she states this is due to all her health issues and having limited transportation to get to visits and having to see so many specialists.  Patient was previously seen in ENT for chronic sinusitis a needing surgery but states the ENT moved and she had no longer has an ENT.  Multinodular goiter ultrasound 07/24/2023 subcentimeter.  Tobacco abuse patient states she is up to 1 pack per day currently with her anxiety she has not desiring cessation.      Telemedicine Notes:  Endocrine clinic note 09/10/2024:  29-year-old female scheduled today for endocrine clinic follow-up.  History of Graves disease, palpitations, thyroid nodules and tobacco use.  Graves disease pt is on MMI 2.5 mg. TSH 3.945, free T4 0.75, free T3 2.64 on 07/10/2024. Free T4 0.79 Free T3 3.03 on 02/28/2024. TPO >1.543, TSI 1.1, Trab 3.40 on 02/28/2024.  Patient was instructed to stop methimazole my stated she was scared to go hyperthyroid remain on methimazole 2.5 mg per day.  Patient denies weight gain.  Patient continues with palpitation previously thought to be anxiety related.  Tobacco abuse patient continues with slow a half to 1 pack per day.  Previously tried patches work was unable to quit.  Discussed with the patient for 4 minutes on smoking sensation and retry him methods once determined to quit.  Multinodular goiter patient was due for yearly follow-up ultrasound orders placed in the system no new palpable nodules are noted.      Telemedicine Notes: Endocrine Clinic 12/13/2024: 29-year-old female scheduled today for endocrine clinic follow-up.  History of Graves  disease, palpitations, thyroid nodules and tobacco use.  Graves disease pt is on MMI 2.5 mg.  Patient was recently seen in the ED stating she was having chest pain on a cough patient had a workup thyroid functions were normal TSH 4.442, free T4 0.93, free T3 3.11 on 12/09/2024.  Patient remains on methimazole she has has a fear going back hyperthyroid has a increased anxiety and palpitations when off of methimazole.  Today patient is reporting that her son was sick had a cough for the last 2 weeks and that her roommate a sick and she is currently sick with a cough and nasal congestion.  Multinodular goiter patient is due for repeat ultrasound.  Patient had missed her previous ultrasound lack of transportation.  Patient had recent impaired fasting blood glucose at 120 previous A1c 2 years ago 4.8 we will recheck A1c        The patient location is: Home   The chief complaint leading to consultation is: Graves disease     Visit type: audiovisual    Face to Face time with patient: 12  21 minutes of total time spent on the encounter, which includes face to face time and non-face to face time preparing to see the patient (eg, review of tests), Obtaining and/or reviewing separately obtained history, Documenting clinical information in the electronic or other health record, Independently interpreting results (not separately reported) and communicating results to the patient/family/caregiver, or Care coordination (not separately reported).     Each patient to whom he or she provides medical services by telemedicine is:  (1) informed of the relationship between the physician and patient and the respective role of any other health care provider with respect to management of the patient; and (2) notified that he or she may decline to receive medical services by telemedicine and may withdraw from such care at any time.    Telemedicine Notes: Endocrine Clinic Note: 30-year-old female scheduled today for endocrine clinic  follow-up.  History of Graves disease, palpitations, thyroid nodules and tobacco use.  Graves disease pt is on MMI 2.5 mg.  On MMI 5 mg 1/2 tablet once a day  TSH 4.442, free T4 0.93, free T3 3.11 on 12/09/2024  TPO >1.543, TSI 1.1, Trab 3.40 on 02/28/2024.  Previously patient did not want to stop methimazole due to stating when she stopped she had symptoms of palpitations, increased anxiety and weight loss.  Discussed with the patient will repeat labs in the patient remains mildly hyperthyroid and we will DC methimazole.         Narrative & Impression  Thyroid ultrasound     INDICATION: 27-year-old woman with enlarged thyroid gland/nodules.     TECHNIQUE: Real-time grayscale and color Doppler sonographic  evaluation of the thyroid gland was performed per routine protocol.     COMPARISON: Thyroid ultrasound 6/28/2019.     FINDINGS:     There is mild enlargement at the right thyroid lobe with the gland  otherwise normal and size and contour. The right thyroid lobe measures  5.4 x 1.6 x 1.9 cm and the left lobe 4.5 x 1.6 x 1.4 cm. The isthmus  measures just under 0.2 cm in AP thickness. A small colloid cyst at  the right thyroid lobe measures 5 x 3 x 4 mm and a second small and  well marginated solid hypoechoic nodule at the right posterior thyroid  lobe 5 x 3 x 5 mm. A single small colloid cyst in the left thyroid  lobe measures 3 x 2 x 3 mm. There is mild diffuse heterogeneity of the  thyroid gland parenchyma along with diffuse hypervascularity at the  gland.     IMPRESSION:     Mild diffuse heterogeneity and hypervascularity at the thyroid gland  suggesting diffuse thyroiditis with several small benign subcentimeter  nodules as detailed above. No significant interval change compared to  the thyroid ultrasound in 2019 aside from interval decrease in size of  the left thyroid nodule.  Electronically Signed By: Erasmo Pa MD  Date/Time Signed: 03/23/2022 16:27     Specimen Collected: 03/23/22 14:13 Last  Resulted: 03/23/22 14:13                                         Review of Systems   Constitutional:  Negative for activity change, appetite change and fatigue.   HENT:  Negative for dental problem, hearing loss, tinnitus, trouble swallowing and goiter.    Eyes:  Negative for photophobia, pain and visual disturbance.   Respiratory:  Negative for cough, chest tightness and wheezing.    Cardiovascular:  Negative for chest pain, palpitations and leg swelling.   Gastrointestinal:  Negative for abdominal pain, constipation, diarrhea, nausea and reflux.   Endocrine: Negative for cold intolerance, heat intolerance, polydipsia and polyphagia.   Genitourinary:  Negative for difficulty urinating, flank pain, hematuria, hot flashes, menstrual irregularity, menstrual problem, nocturia and urgency.   Musculoskeletal:  Negative for back pain, gait problem, joint swelling, leg pain and joint deformity.   Integumentary:  Negative for color change, pallor, rash and breast discharge.   Allergic/Immunologic: Negative for environmental allergies, food allergies and immunocompromised state.   Neurological:  Negative for tremors, seizures, headaches and coordination difficulties.   Psychiatric/Behavioral:  Negative for agitation, behavioral problems and sleep disturbance. The patient is not nervous/anxious.           Objective     Physical Exam  Constitutional:       Appearance: Normal appearance.   HENT:      Head: Normocephalic.      Nose: Nose normal.   Eyes:      Conjunctiva/sclera: Conjunctivae normal.   Pulmonary:      Effort: Pulmonary effort is normal.   Musculoskeletal:      Cervical back: Normal range of motion.   Neurological:      Mental Status: She is alert.   Psychiatric:         Mood and Affect: Mood normal.         Behavior: Behavior normal.         Thought Content: Thought content normal.         Judgment: Judgment normal.            Assessment and Plan     1. Graves disease  On MMI 5 mg 1/2 tablet once a day   TSH  4.442, free T4 0.93, free T3 3.11 on 12/09/2024  TPO >1.543, TSI 1.1, Trab 3.40 on 02/28/2024             Component  Ref Range & Units (hover) 4 mo ago  (12/9/24) 5 mo ago  (11/1/24) 9 mo ago  (7/10/24) 1 yr ago  (8/18/23) 1 yr ago  (7/24/23) 2 yr ago  (1/19/23) 2 yr ago  (7/19/22)   TSH 4.442 3.126 3.945 1.926 4.623 2.769                Component  Ref Range & Units (hover) 4 mo ago  (12/9/24) 9 mo ago  (7/10/24) 1 yr ago  (2/28/24) 1 yr ago  (7/24/23) 2 yr ago  (1/19/23) 2 yr ago  (7/19/22) 4 yr ago  (3/22/21)   T3 Free 3.11 2.64 3.03 2.87 3.05 R 2.98 R 3.02 R   Resulting Agency [facility]        Component  Ref Range & Units (hover) 4 mo ago  (12/9/24) 9 mo ago  (7/10/24) 1 yr ago  (2/28/24) 1 yr ago  (7/24/23) 2 yr ago  (1/19/23) 2 yr ago  (7/19/22) 3 yr ago  (12/19/21)   Thyroxine Free 0.93 0.75 0.79 0.79 1.00 0.91 0.91   Resulting Agency [facility]      -     TSH; Future; Expected date: 04/14/2025  -     T3, Free ([facility]); Future; Expected date: 04/14/2025  -     T4, Free; Future; Expected date: 04/14/2025    2. Palpitations  Continues w/ palpitations  No medication does time likely anxiety related    3. Multinodular goiter  Subcentimeter meter nodules ultrasound 07/24/2023         Follow up in about 4 months (around 8/14/2025) for Graves, Multinodular Goiter.

## 2025-04-16 ENCOUNTER — HOSPITAL ENCOUNTER (OUTPATIENT)
Dept: RADIOLOGY | Facility: HOSPITAL | Age: 30
Discharge: HOME | End: 2025-04-16
Payer: MEDICAID

## 2025-04-16 DIAGNOSIS — G89.29 ABDOMINAL PAIN, CHRONIC, RIGHT UPPER QUADRANT: ICD-10-CM

## 2025-04-16 DIAGNOSIS — R10.11 ABDOMINAL PAIN, CHRONIC, RIGHT UPPER QUADRANT: ICD-10-CM

## 2025-04-16 PROCEDURE — 93976 VASCULAR STUDY: CPT | Mod: TC

## 2025-05-01 ENCOUNTER — HOSPITAL ENCOUNTER (EMERGENCY)
Facility: HOSPITAL | Age: 30
Discharge: HOME | End: 2025-05-01
Attending: EMERGENCY MEDICINE
Payer: MEDICAID

## 2025-05-01 VITALS
RESPIRATION RATE: 18 BRPM | OXYGEN SATURATION: 100 % | HEIGHT: 60 IN | HEART RATE: 84 BPM | WEIGHT: 130 LBS | DIASTOLIC BLOOD PRESSURE: 70 MMHG | SYSTOLIC BLOOD PRESSURE: 104 MMHG | TEMPERATURE: 97 F | BODY MASS INDEX: 25.52 KG/M2

## 2025-05-01 DIAGNOSIS — K02.9 DENTAL CARIES: ICD-10-CM

## 2025-05-01 DIAGNOSIS — K08.89 PAIN, DENTAL: Primary | ICD-10-CM

## 2025-05-01 LAB
ALBUMIN SERPL-MCNC: 4.2 G/DL (ref 3.5–5)
ALBUMIN/GLOB SERPL: 1.3 RATIO (ref 1.1–2)
ALP SERPL-CCNC: 71 UNIT/L (ref 40–150)
ALT SERPL-CCNC: 16 UNIT/L (ref 0–55)
ANION GAP SERPL CALC-SCNC: 8 MEQ/L
AST SERPL-CCNC: 14 UNIT/L (ref 11–45)
BASOPHILS # BLD AUTO: 0.06 X10(3)/MCL
BASOPHILS NFR BLD AUTO: 0.5 %
BILIRUB SERPL-MCNC: 0.5 MG/DL
BUN SERPL-MCNC: 7.1 MG/DL (ref 7–18.7)
CALCIUM SERPL-MCNC: 8.8 MG/DL (ref 8.4–10.2)
CHLORIDE SERPL-SCNC: 107 MMOL/L (ref 98–107)
CO2 SERPL-SCNC: 24 MMOL/L (ref 22–29)
CREAT SERPL-MCNC: 0.71 MG/DL (ref 0.55–1.02)
CREAT/UREA NIT SERPL: 10
EOSINOPHIL # BLD AUTO: 0.16 X10(3)/MCL (ref 0–0.9)
EOSINOPHIL NFR BLD AUTO: 1.4 %
ERYTHROCYTE [DISTWIDTH] IN BLOOD BY AUTOMATED COUNT: 14.1 % (ref 11.5–17)
GFR SERPLBLD CREATININE-BSD FMLA CKD-EPI: >60 ML/MIN/1.73/M2
GLOBULIN SER-MCNC: 3.3 GM/DL (ref 2.4–3.5)
GLUCOSE SERPL-MCNC: 89 MG/DL (ref 74–100)
HCT VFR BLD AUTO: 42.1 % (ref 37–47)
HGB BLD-MCNC: 13.4 G/DL (ref 12–16)
IMM GRANULOCYTES # BLD AUTO: 0.04 X10(3)/MCL (ref 0–0.04)
IMM GRANULOCYTES NFR BLD AUTO: 0.4 %
LYMPHOCYTES # BLD AUTO: 1.3 X10(3)/MCL (ref 0.6–4.6)
LYMPHOCYTES NFR BLD AUTO: 11.7 %
MCH RBC QN AUTO: 29.2 PG (ref 27–31)
MCHC RBC AUTO-ENTMCNC: 31.8 G/DL (ref 33–36)
MCV RBC AUTO: 91.7 FL (ref 80–94)
MONOCYTES # BLD AUTO: 0.82 X10(3)/MCL (ref 0.1–1.3)
MONOCYTES NFR BLD AUTO: 7.4 %
NEUTROPHILS # BLD AUTO: 8.77 X10(3)/MCL (ref 2.1–9.2)
NEUTROPHILS NFR BLD AUTO: 78.6 %
NRBC BLD AUTO-RTO: 0 %
PLATELET # BLD AUTO: 215 X10(3)/MCL (ref 130–400)
PMV BLD AUTO: 11.9 FL (ref 7.4–10.4)
POTASSIUM SERPL-SCNC: 4.4 MMOL/L (ref 3.5–5.1)
PROT SERPL-MCNC: 7.5 GM/DL (ref 6.4–8.3)
RBC # BLD AUTO: 4.59 X10(6)/MCL (ref 4.2–5.4)
SODIUM SERPL-SCNC: 139 MMOL/L (ref 136–145)
WBC # BLD AUTO: 11.15 X10(3)/MCL (ref 4.5–11.5)

## 2025-05-01 PROCEDURE — 99284 EMERGENCY DEPT VISIT MOD MDM: CPT

## 2025-05-01 PROCEDURE — 80053 COMPREHEN METABOLIC PANEL: CPT

## 2025-05-01 PROCEDURE — 25000003 PHARM REV CODE 250

## 2025-05-01 PROCEDURE — 85025 COMPLETE CBC W/AUTO DIFF WBC: CPT

## 2025-05-01 RX ORDER — LIDOCAINE HYDROCHLORIDE 20 MG/ML
10 JELLY TOPICAL
Status: DISCONTINUED | OUTPATIENT
Start: 2025-05-01 | End: 2025-05-01

## 2025-05-01 RX ORDER — HYDROCODONE BITARTRATE AND ACETAMINOPHEN 5; 325 MG/1; MG/1
1 TABLET ORAL
Refills: 0 | Status: COMPLETED | OUTPATIENT
Start: 2025-05-01 | End: 2025-05-01

## 2025-05-01 RX ORDER — AMOXICILLIN AND CLAVULANATE POTASSIUM 875; 125 MG/1; MG/1
1 TABLET, FILM COATED ORAL 2 TIMES DAILY
Qty: 20 TABLET | Refills: 0 | Status: SHIPPED | OUTPATIENT
Start: 2025-05-01 | End: 2025-05-11

## 2025-05-01 RX ORDER — ONDANSETRON 4 MG/1
4 TABLET, FILM COATED ORAL EVERY 6 HOURS PRN
Qty: 15 TABLET | Refills: 0 | Status: SHIPPED | OUTPATIENT
Start: 2025-05-01

## 2025-05-01 RX ADMIN — BENZOCAINE: 0.1 GEL TOPICAL at 11:05

## 2025-05-01 RX ADMIN — HYDROCODONE BITARTRATE AND ACETAMINOPHEN 1 TABLET: 5; 325 TABLET ORAL at 09:05

## 2025-05-01 NOTE — ED PROVIDER NOTES
"Encounter Date: 5/1/2025       History     Chief Complaint   Patient presents with    Dental Pain     Pt presents with left lower dental pain. Onset x 1 week. States that she was prescribed clindamycin then augmentin by an unknown physician. Continues to have pain and swelling to the area. Denies fever.      HPI  Review of patient's allergies indicates:   Allergen Reactions    Benadryl [diphenhydramine hcl] Anxiety     Hallucinations     Diphenhydramine Anxiety and Other (See Comments)     Past Medical History:   Diagnosis Date    Anxiety disorder, unspecified     Depression     Essential fatty acid deficiency     Gastric motility disorder     Graves disease     History of ovarian cyst     frequent, reoccurring with hx of rupture and hemorrhage    Iron deficiency anemia, unspecified     Pelvic congestion syndrome     Poor dentition     POTS (postural orthostatic tachycardia syndrome)     Smoker     Vitamin D deficiency      Past Surgical History:   Procedure Laterality Date    CHOLECYSTECTOMY      DX-LAP, EVACUATION HEMOPERITONEUM  11/30/2021    GALLBLADDER SURGERY      LAPAROSCOPIC CHOLECYSTECTOMY N/A 10/10/2022    Procedure: CHOLECYSTECTOMY, LAPAROSCOPIC;  Surgeon: Tobin Aguiar MD;  Location: [facility];  Service: General;  Laterality: N/A;    TONSILLECTOMY  11/2011     Family History   Problem Relation Name Age of Onset    Heart disease Paternal Grandfather Dany Aryan II     Cancer Paternal Grandfather Dany Aryan II     Stroke Paternal Grandfather Dany Aryan II     Heart disease Paternal Grandmother Sophie Baeza     Pancreatic cancer Maternal Grandmother Yahaira "Casi" Nadira     Ovarian cancer Maternal Grandmother Yahaira "Casi" Nadira     Cancer Maternal Grandmother Yahaira "Casi" Nadira     Heart disease Father Dany Aryan III     Heart attack Father Dany Aryan III     Coronary artery disease Father Dany Aryan III     Cancer Mother Priscilla Janene         colorectal cancer    Bipolar disorder Mother Priscilla " Janene     Irritable bowel syndrome Mother Priscilla Janene     No Known Problems Brother      Kidney disease Sister      No Known Problems Son 2     Stroke Paternal Aunt Emma Baeza     Stroke Maternal Aunt Sara Warner      Social History[1]  Review of Systems    Physical Exam     Initial Vitals [05/01/25 0901]   BP Pulse Resp Temp SpO2   97/66 93 20 98 °F (36.7 °C) 98 %      MAP       --         Physical Exam    ED Course   Procedures  Labs Reviewed   CBC WITH DIFFERENTIAL - Abnormal       Result Value    WBC 11.15      RBC 4.59      Hgb 13.4      Hct 42.1      MCV 91.7      MCH 29.2      MCHC 31.8 (*)     RDW 14.1      Platelet 215      MPV 11.9 (*)     Neut % 78.6      Lymph % 11.7      Mono % 7.4      Eos % 1.4      Basophil % 0.5      Imm Grans % 0.4      Neut # 8.77      Lymph # 1.30      Mono # 0.82      Eos # 0.16      Baso # 0.06      Imm Gran # 0.04      NRBC% 0.0     CBC W/ AUTO DIFFERENTIAL    Narrative:     The following orders were created for panel order CBC auto differential.  Procedure                               Abnormality         Status                     ---------                               -----------         ------                     CBC with Differential[[id number]]       Abnormal            Final result                 Please view results for these tests on the individual orders.   COMPREHENSIVE METABOLIC PANEL          Imaging Results    None          Medications   benzocaine 10 % mucosal gel (has no administration in time range)   HYDROcodone-acetaminophen 5-325 mg per tablet 1 tablet (1 tablet Oral Given 5/1/25 0958)     Medical Decision Making  Amount and/or Complexity of Data Reviewed  Labs: ordered.    Risk  OTC drugs.  Prescription drug management.              Attending Attestation:   Physician Attestation Statement for Resident:  As the supervising MD   Physician Attestation Statement: I have personally seen and examined this patient.   I agree with the above history.  -:   As  the supervising MD I agree with the above PE.     As the supervising MD I agree with the above treatment, course, plan, and disposition.   -: Patient with poor dentition, history of dental caries, odontogenic infections in the past requiring extraction presents with left lower jaw pain because of dental caries/possible infection.  On Augmentin, but pain not controlled with ibuprofen and Tylenol.  On exam, no evidence of abscess, cellulitis.  Patient given dental resources by case management and will help control pain.                                          Clinical Impression:  Final diagnoses:  [K08.89] Pain, dental (Primary)  [K02.9] Dental caries                     [1]   Social History  Tobacco Use    Smoking status: Every Day     Current packs/day: 1.50     Average packs/day: 1.3 packs/day for 31.3 years (40.0 ttl pk-yrs)     Types: Cigarettes     Start date: 1/1/2008     Passive exposure: Never    Smokeless tobacco: Never   Substance Use Topics    Alcohol use: Never    Drug use: Never        Rashawn Simpson MD  05/01/25 1100

## 2025-05-01 NOTE — ED PROVIDER NOTES
Encounter Date: 5/1/2025       History     Chief Complaint   Patient presents with    Dental Pain     Pt presents with left lower dental pain. Onset x 1 week. States that she was prescribed clindamycin then augmentin by an unknown physician. Continues to have pain and swelling to the area. Denies fever.      HPI  30 year old female with PMH of Anxiety/Depression, Graves disease, Chronic poor dentition who presented to the ED with complaint of left lower dental pain. Ongoing for the last week. Describes pain as throbbing/pressure. Eating makes it worse. Patient reports that she was prescribed clindamycin by an unknown provider. However, she had no relief after taking for 3 days and so she went to Urgent care. She was then prescribed Augmentin and told she could take with Clindamycin. Patient reports no relief in tooth pain despite taking both antibiotics. She reports also using Tylenol/Ibuprofen, salt water rinses, compresses without any relief.   She has a dentist and has had several teeth pulled out in the past due to cavities. She states that she called her dental office and they told her they would have to pull the teeth but she doesn't have money for that now. Denies any fever, facial swelling or drainage from gum, chest pain, SOB but reports chills, nausea, body aches and headache.   Tobacco user.     Review of patient's allergies indicates:   Allergen Reactions    Benadryl [diphenhydramine hcl] Anxiety     Hallucinations     Diphenhydramine Anxiety and Other (See Comments)     Past Medical History:   Diagnosis Date    Anxiety disorder, unspecified     Depression     Essential fatty acid deficiency     Gastric motility disorder     Graves disease     History of ovarian cyst     frequent, reoccurring with hx of rupture and hemorrhage    Iron deficiency anemia, unspecified     Pelvic congestion syndrome     Poor dentition     POTS (postural orthostatic tachycardia syndrome)     Smoker     Vitamin D deficiency   "    Past Surgical History:   Procedure Laterality Date    CHOLECYSTECTOMY      DX-LAP, EVACUATION HEMOPERITONEUM  11/30/2021    GALLBLADDER SURGERY      LAPAROSCOPIC CHOLECYSTECTOMY N/A 10/10/2022    Procedure: CHOLECYSTECTOMY, LAPAROSCOPIC;  Surgeon: Tobin Aguiar MD;  Location: [facility];  Service: General;  Laterality: N/A;    TONSILLECTOMY  11/2011     Family History   Problem Relation Name Age of Onset    Heart disease Paternal Grandfather Dany Baeza II     Cancer Paternal Grandfather Dany Baeza II     Stroke Paternal Grandfather Dany Bazea II     Heart disease Paternal Grandmother Sophie Baeza     Pancreatic cancer Maternal Grandmother Yahaira "Casi" Nadira     Ovarian cancer Maternal Grandmother Yahaira "Casi" Nadira     Cancer Maternal Grandmother Yahaira "Casi" Nadira     Heart disease Father Dany Baeza III     Heart attack Father Dany Baeza III     Coronary artery disease Father Dany Baeza III     Cancer Mother Priscilla Janene         colorectal cancer    Bipolar disorder Mother Priscilla Janene     Irritable bowel syndrome Mother Priscilla Janene     No Known Problems Brother      Kidney disease Sister      No Known Problems Son 2     Stroke Paternal Aunt Emma Baeza     Stroke Maternal Aunt Sara Warner      Social History[1]  Review of Systems   Constitutional:  Positive for chills. Negative for fever.   HENT:  Positive for dental problem. Negative for facial swelling and sinus pressure.    Respiratory:  Negative for stridor.    Cardiovascular:  Negative for chest pain.   Gastrointestinal:  Positive for nausea and vomiting. Negative for abdominal pain.       Physical Exam     Initial Vitals [05/01/25 0901]   BP Pulse Resp Temp SpO2   97/66 93 20 98 °F (36.7 °C) 98 %      MAP       --           Physical Exam  Vitals reviewed.   Constitutional:       General: She is not in acute distress.     Appearance: She is not ill-appearing, toxic-appearing or diaphoretic.   HENT:      Mouth/Throat:      Dentition: " Abnormal dentition. Does not have dentures. Dental tenderness and dental caries present. No dental abscesses or gum lesions.        Comments: Poor dentition.Tenderness to palpation of teeth circled above with tongue depressor.  Severe dental caries. No dental abscesses noted. Mild gingival swelling.   Cardiovascular:      Rate and Rhythm: Normal rate and regular rhythm.      Pulses: Normal pulses.      Heart sounds: Normal heart sounds. No murmur heard.     No gallop.   Pulmonary:      Effort: Pulmonary effort is normal.      Breath sounds: Normal breath sounds.   Neurological:      General: No focal deficit present.      Mental Status: She is alert and oriented to person, place, and time.        ED Course   Procedures  Labs Reviewed   CBC WITH DIFFERENTIAL - Abnormal       Result Value    WBC 11.15      RBC 4.59      Hgb 13.4      Hct 42.1      MCV 91.7      MCH 29.2      MCHC 31.8 (*)     RDW 14.1      Platelet 215      MPV 11.9 (*)     Neut % 78.6      Lymph % 11.7      Mono % 7.4      Eos % 1.4      Basophil % 0.5      Imm Grans % 0.4      Neut # 8.77      Lymph # 1.30      Mono # 0.82      Eos # 0.16      Baso # 0.06      Imm Gran # 0.04      NRBC% 0.0     CBC W/ AUTO DIFFERENTIAL    Narrative:     The following orders were created for panel order CBC auto differential.  Procedure                               Abnormality         Status                     ---------                               -----------         ------                     CBC with Differential[[id number]]       Abnormal            Final result                 Please view results for these tests on the individual orders.   COMPREHENSIVE METABOLIC PANEL    Sodium 139      Potassium 4.4      Chloride 107      CO2 24      Glucose 89      Blood Urea Nitrogen 7.1      Creatinine 0.71      Calcium 8.8      Protein Total 7.5      Albumin 4.2      Globulin 3.3      Albumin/Globulin Ratio 1.3      Bilirubin Total 0.5      ALP 71      ALT 16      AST  14      eGFR >60      Anion Gap 8.0      BUN/Creatinine Ratio 10            Imaging Results    None          Medications   HYDROcodone-acetaminophen 5-325 mg per tablet 1 tablet (1 tablet Oral Given 5/1/25 8619)   benzocaine 10 % mucosal gel ( Mouth/Throat Given 5/1/25 6568)     Medical Decision Making  Vitals stable. Afebrile.   CBC and CMP unremarkable. No leukocytosis.   Norco 5mg given with pain relief  Provided patient with a list of affordable dental providers, insurances they accept and contact numbers. Encouraged patient to follow up with a dental provider.   Patient reports that she has only been taking Augmentin for the last 2 days. Previously took Clindamycin but was inconsistent with use. Instructed patient to continue Augmentin for 7-10 days. She was also counseled on tobacco cessation. ED precautions given for worsening pain, signs of infection etc. Patient voiced understanding.     At this time, patient is stable for discharge home. Patient had several questions which were answered prior to discharge.     Amount and/or Complexity of Data Reviewed  Labs: ordered.    Risk  OTC drugs.  Prescription drug management.                                Clinical Impression:  Final diagnoses:  [K08.89] Pain, dental (Primary)  [K02.9] Dental caries          ED Disposition Condition    Discharge Stable          ED Prescriptions       Medication Sig Dispense Start Date End Date Auth. Provider    amoxicillin-clavulanate 875-125mg (AUGMENTIN) 875-125 mg per tablet Take 1 tablet by mouth 2 (two) times daily. for 10 days 20 tablet 5/1/2025 5/11/2025 Whit Fernandez MD    benzocaine (ORAJEL) 10 % mucosal gel Use as directed in the mouth or throat 3 (three) times daily as needed for Pain. 9 g 5/1/2025 -- Whit Fernandez MD    ondansetron (ZOFRAN) 4 MG tablet Take 1 tablet (4 mg total) by mouth every 6 (six) hours as needed for Nausea. 15 tablet 5/1/2025 -- Whit Fernandez MD          Follow-up Information        Follow up With Specialties Details Why Contact Info    [facility] - Emergency Dept Emergency Medicine  If symptoms worsen [address] [facility] [address] [phone]                 [1]   Social History  Tobacco Use    Smoking status: Every Day     Current packs/day: 1.50     Average packs/day: 1.3 packs/day for 31.3 years (40.0 ttl pk-yrs)     Types: Cigarettes     Start date: 1/1/2008     Passive exposure: Never    Smokeless tobacco: Never   Substance Use Topics    Alcohol use: Never    Drug use: Never        Whit Fernandez MD  Resident  05/01/25 1145

## 2025-06-30 RX ORDER — METHIMAZOLE 5 MG/1
TABLET ORAL
Qty: 15 TABLET | Refills: 2 | Status: SHIPPED | OUTPATIENT
Start: 2025-06-30

## 2025-07-08 ENCOUNTER — TELEPHONE (OUTPATIENT)
Dept: ENDOCRINOLOGY | Facility: CLINIC | Age: 30
End: 2025-07-08
Payer: MEDICAID

## 2025-07-08 DIAGNOSIS — E04.2 MULTINODULAR GOITER: Primary | ICD-10-CM

## 2025-07-08 NOTE — TELEPHONE ENCOUNTER
----- Message from Nurse Corrales sent at 7/8/2025 11:30 AM CDT -----  Request re submit  ultrasound orders  ----- Message -----  From: Mayi Rubalcava  Sent: 7/8/2025  10:54 AM CDT  To: [facility] Clinical Support Staff    Patient of Meredith    Patient called stating she missed the ultrasound and order needs to be put back in.    Patient contact #[phone].    10:53  Thanks,

## 2025-07-10 ENCOUNTER — LAB VISIT (OUTPATIENT)
Dept: LAB | Facility: HOSPITAL | Age: 30
End: 2025-07-10
Attending: NURSE PRACTITIONER
Payer: MEDICAID

## 2025-07-10 DIAGNOSIS — E05.00 GRAVES DISEASE: ICD-10-CM

## 2025-07-10 LAB
T3FREE SERPL-MCNC: 2.55 PG/ML (ref 1.58–3.91)
T4 FREE SERPL-MCNC: 0.89 NG/DL (ref 0.7–1.48)
TSH SERPL-ACNC: 2.09 UIU/ML (ref 0.35–4.94)

## 2025-07-10 PROCEDURE — 84443 ASSAY THYROID STIM HORMONE: CPT

## 2025-07-10 PROCEDURE — 36415 COLL VENOUS BLD VENIPUNCTURE: CPT

## 2025-07-10 PROCEDURE — 84439 ASSAY OF FREE THYROXINE: CPT

## 2025-07-10 PROCEDURE — 84481 FREE ASSAY (FT-3): CPT

## 2025-07-16 ENCOUNTER — HOSPITAL ENCOUNTER (OUTPATIENT)
Dept: RADIOLOGY | Facility: HOSPITAL | Age: 30
Discharge: HOME | End: 2025-07-16
Attending: NURSE PRACTITIONER
Payer: MEDICAID

## 2025-07-16 ENCOUNTER — PATIENT MESSAGE (OUTPATIENT)
Dept: GASTROENTEROLOGY | Facility: CLINIC | Age: 30
End: 2025-07-16
Payer: MEDICAID

## 2025-07-16 DIAGNOSIS — E04.2 MULTINODULAR GOITER: ICD-10-CM

## 2025-07-16 PROCEDURE — 76536 US EXAM OF HEAD AND NECK: CPT | Mod: TC

## 2025-07-24 ENCOUNTER — HOSPITAL ENCOUNTER (EMERGENCY)
Facility: HOSPITAL | Age: 30
Discharge: HOME | End: 2025-07-24
Attending: EMERGENCY MEDICINE
Payer: MEDICAID

## 2025-07-24 VITALS
RESPIRATION RATE: 16 BRPM | SYSTOLIC BLOOD PRESSURE: 105 MMHG | BODY MASS INDEX: 22.38 KG/M2 | DIASTOLIC BLOOD PRESSURE: 78 MMHG | OXYGEN SATURATION: 99 % | HEART RATE: 65 BPM | TEMPERATURE: 99 F | HEIGHT: 60 IN | WEIGHT: 114 LBS

## 2025-07-24 DIAGNOSIS — I47.10 PAROXYSMAL SVT (SUPRAVENTRICULAR TACHYCARDIA): ICD-10-CM

## 2025-07-24 DIAGNOSIS — R00.0 TACHYCARDIA: ICD-10-CM

## 2025-07-24 DIAGNOSIS — R10.10 UPPER ABDOMINAL PAIN: ICD-10-CM

## 2025-07-24 DIAGNOSIS — R10.9 ABDOMINAL PAIN, UNSPECIFIED ABDOMINAL LOCATION: Primary | ICD-10-CM

## 2025-07-24 LAB
ALBUMIN SERPL-MCNC: 4.2 G/DL (ref 3.5–5)
ALBUMIN/GLOB SERPL: 1.2 RATIO (ref 1.1–2)
ALP SERPL-CCNC: 64 UNIT/L (ref 40–150)
ALT SERPL-CCNC: 12 UNIT/L (ref 0–55)
ANION GAP SERPL CALC-SCNC: 9 MEQ/L
AST SERPL-CCNC: 15 UNIT/L (ref 11–45)
B-HCG UR QL: NEGATIVE
BACTERIA #/AREA URNS AUTO: ABNORMAL /HPF
BASOPHILS # BLD AUTO: 0.05 X10(3)/MCL
BASOPHILS NFR BLD AUTO: 0.9 %
BILIRUB SERPL-MCNC: 0.6 MG/DL
BILIRUB UR QL STRIP.AUTO: NEGATIVE
BUN SERPL-MCNC: 9.5 MG/DL (ref 7–18.7)
CALCIUM SERPL-MCNC: 8.8 MG/DL (ref 8.4–10.2)
CHLORIDE SERPL-SCNC: 107 MMOL/L (ref 98–107)
CLARITY UR: CLEAR
CO2 SERPL-SCNC: 21 MMOL/L (ref 22–29)
COLOR UR AUTO: YELLOW
CREAT SERPL-MCNC: 0.71 MG/DL (ref 0.55–1.02)
CREAT/UREA NIT SERPL: 13
EOSINOPHIL # BLD AUTO: 0.1 X10(3)/MCL (ref 0–0.9)
EOSINOPHIL NFR BLD AUTO: 1.7 %
ERYTHROCYTE [DISTWIDTH] IN BLOOD BY AUTOMATED COUNT: 13.3 % (ref 11.5–17)
GFR SERPLBLD CREATININE-BSD FMLA CKD-EPI: >60 ML/MIN/1.73/M2
GLOBULIN SER-MCNC: 3.5 GM/DL (ref 2.4–3.5)
GLUCOSE SERPL-MCNC: 93 MG/DL (ref 74–100)
GLUCOSE UR QL STRIP: NORMAL
HCT VFR BLD AUTO: 40.2 % (ref 37–47)
HGB BLD-MCNC: 13 G/DL (ref 12–16)
HGB UR QL STRIP: NEGATIVE
IMM GRANULOCYTES # BLD AUTO: 0.01 X10(3)/MCL (ref 0–0.04)
IMM GRANULOCYTES NFR BLD AUTO: 0.2 %
KETONES UR QL STRIP: ABNORMAL
LEUKOCYTE ESTERASE UR QL STRIP: 25
LIPASE SERPL-CCNC: 9 U/L
LYMPHOCYTES # BLD AUTO: 2.03 X10(3)/MCL (ref 0.6–4.6)
LYMPHOCYTES NFR BLD AUTO: 34.5 %
MCH RBC QN AUTO: 30.1 PG (ref 27–31)
MCHC RBC AUTO-ENTMCNC: 32.3 G/DL (ref 33–36)
MCV RBC AUTO: 93.1 FL (ref 80–94)
MONOCYTES # BLD AUTO: 0.46 X10(3)/MCL (ref 0.1–1.3)
MONOCYTES NFR BLD AUTO: 7.8 %
MUCOUS THREADS URNS QL MICRO: ABNORMAL /LPF
NEUTROPHILS # BLD AUTO: 3.23 X10(3)/MCL (ref 2.1–9.2)
NEUTROPHILS NFR BLD AUTO: 54.9 %
NITRITE UR QL STRIP: NEGATIVE
NRBC BLD AUTO-RTO: 0 %
OHS QRS DURATION: 74 MS
OHS QRS DURATION: 76 MS
OHS QTC CALCULATION: 438 MS
OHS QTC CALCULATION: 461 MS
PH UR STRIP: 8 [PH]
PLATELET # BLD AUTO: 211 X10(3)/MCL (ref 130–400)
PMV BLD AUTO: 11.5 FL (ref 7.4–10.4)
POTASSIUM SERPL-SCNC: 3.1 MMOL/L (ref 3.5–5.1)
PROT SERPL-MCNC: 7.7 GM/DL (ref 6.4–8.3)
PROT UR QL STRIP: NEGATIVE
RBC # BLD AUTO: 4.32 X10(6)/MCL (ref 4.2–5.4)
RBC #/AREA URNS AUTO: ABNORMAL /HPF
SODIUM SERPL-SCNC: 137 MMOL/L (ref 136–145)
SP GR UR STRIP.AUTO: 1.02 (ref 1–1.03)
SQUAMOUS #/AREA URNS LPF: ABNORMAL /HPF
UROBILINOGEN UR STRIP-ACNC: NORMAL
WBC # BLD AUTO: 5.88 X10(3)/MCL (ref 4.5–11.5)
WBC #/AREA URNS AUTO: ABNORMAL /HPF

## 2025-07-24 PROCEDURE — 80053 COMPREHEN METABOLIC PANEL: CPT

## 2025-07-24 PROCEDURE — 83690 ASSAY OF LIPASE: CPT

## 2025-07-24 PROCEDURE — 93005 ELECTROCARDIOGRAM TRACING: CPT

## 2025-07-24 PROCEDURE — 25000003 PHARM REV CODE 250: Performed by: EMERGENCY MEDICINE

## 2025-07-24 PROCEDURE — 96361 HYDRATE IV INFUSION ADD-ON: CPT

## 2025-07-24 PROCEDURE — 96375 TX/PRO/DX INJ NEW DRUG ADDON: CPT

## 2025-07-24 PROCEDURE — 99285 EMERGENCY DEPT VISIT HI MDM: CPT | Mod: 25

## 2025-07-24 PROCEDURE — 63600175 PHARM REV CODE 636 W HCPCS: Mod: JZ,TB | Performed by: EMERGENCY MEDICINE

## 2025-07-24 PROCEDURE — 93010 ELECTROCARDIOGRAM REPORT: CPT | Mod: ,,, | Performed by: INTERNAL MEDICINE

## 2025-07-24 PROCEDURE — 63600175 PHARM REV CODE 636 W HCPCS

## 2025-07-24 PROCEDURE — 81015 MICROSCOPIC EXAM OF URINE: CPT

## 2025-07-24 PROCEDURE — 25500020 PHARM REV CODE 255: Performed by: EMERGENCY MEDICINE

## 2025-07-24 PROCEDURE — 96374 THER/PROPH/DIAG INJ IV PUSH: CPT

## 2025-07-24 PROCEDURE — 81025 URINE PREGNANCY TEST: CPT

## 2025-07-24 PROCEDURE — 25000003 PHARM REV CODE 250

## 2025-07-24 PROCEDURE — 85025 COMPLETE CBC W/AUTO DIFF WBC: CPT

## 2025-07-24 RX ORDER — ONDANSETRON 4 MG/1
4 TABLET, ORALLY DISINTEGRATING ORAL EVERY 6 HOURS PRN
Qty: 12 TABLET | Refills: 0 | Status: SHIPPED | OUTPATIENT
Start: 2025-07-24

## 2025-07-24 RX ORDER — SUCRALFATE 1 G/10ML
1 SUSPENSION ORAL EVERY 6 HOURS
Status: DISCONTINUED | OUTPATIENT
Start: 2025-07-24 | End: 2025-07-24 | Stop reason: HOSPADM

## 2025-07-24 RX ORDER — POTASSIUM CHLORIDE 750 MG/1
10 TABLET, EXTENDED RELEASE ORAL
Status: COMPLETED | OUTPATIENT
Start: 2025-07-24 | End: 2025-07-24

## 2025-07-24 RX ORDER — ONDANSETRON HYDROCHLORIDE 2 MG/ML
4 INJECTION, SOLUTION INTRAVENOUS
Status: DISCONTINUED | OUTPATIENT
Start: 2025-07-24 | End: 2025-07-24 | Stop reason: HOSPADM

## 2025-07-24 RX ORDER — ALUMINUM HYDROXIDE, MAGNESIUM HYDROXIDE, AND SIMETHICONE 1200; 120; 1200 MG/30ML; MG/30ML; MG/30ML
30 SUSPENSION ORAL
Status: COMPLETED | OUTPATIENT
Start: 2025-07-24 | End: 2025-07-24

## 2025-07-24 RX ORDER — PANTOPRAZOLE SODIUM 20 MG/1
20 TABLET, DELAYED RELEASE ORAL DAILY
Qty: 30 TABLET | Refills: 0 | Status: SHIPPED | OUTPATIENT
Start: 2025-07-24 | End: 2026-07-24

## 2025-07-24 RX ORDER — ALUMINUM HYDROXIDE, MAGNESIUM HYDROXIDE, AND SIMETHICONE 1200; 120; 1200 MG/30ML; MG/30ML; MG/30ML
30 SUSPENSION ORAL
Status: DISCONTINUED | OUTPATIENT
Start: 2025-07-24 | End: 2025-07-24

## 2025-07-24 RX ORDER — ONDANSETRON HYDROCHLORIDE 2 MG/ML
4 INJECTION, SOLUTION INTRAVENOUS
Status: COMPLETED | OUTPATIENT
Start: 2025-07-24 | End: 2025-07-24

## 2025-07-24 RX ORDER — KETOROLAC TROMETHAMINE 30 MG/ML
15 INJECTION, SOLUTION INTRAMUSCULAR; INTRAVENOUS
Status: COMPLETED | OUTPATIENT
Start: 2025-07-24 | End: 2025-07-24

## 2025-07-24 RX ADMIN — ALUMINUM HYDROXIDE, MAGNESIUM HYDROXIDE, AND DIMETHICONE 30 ML: 200; 20; 200 SUSPENSION ORAL at 07:07

## 2025-07-24 RX ADMIN — KETOROLAC TROMETHAMINE 15 MG: 30 INJECTION, SOLUTION INTRAMUSCULAR at 02:07

## 2025-07-24 RX ADMIN — POTASSIUM CHLORIDE 10 MEQ: 750 TABLET, FILM COATED, EXTENDED RELEASE ORAL at 05:07

## 2025-07-24 RX ADMIN — SUCRALFATE ORAL 1 G: 1 SUSPENSION ORAL at 05:07

## 2025-07-24 RX ADMIN — ONDANSETRON 4 MG: 2 INJECTION INTRAMUSCULAR; INTRAVENOUS at 02:07

## 2025-07-24 RX ADMIN — IOHEXOL 100 ML: 350 INJECTION, SOLUTION INTRAVENOUS at 05:07

## 2025-07-24 RX ADMIN — SODIUM CHLORIDE 1000 ML: 9 INJECTION, SOLUTION INTRAVENOUS at 02:07

## 2025-07-24 NOTE — ED PROVIDER NOTES
Encounter Date: 7/24/2025    SCRIBE #1 NOTE: I, Radha Kennedy, am scribing for, and in the presence of,  Rashawn Simpson MD. I have scribed the entire note.       History     Chief Complaint   Patient presents with    Abdominal Pain     Reports upper abd pain/ epigastric pain x 1 year. States pain worsened yesterday. Also reports palpitations today      30 year old female with a hx of anxiety and depression presents to the ED for abdominal pain. Pt states she has been experiencing upper abdominal and epigastric pain the last few days. Pt also reports episodes of nausea. Today, the pt began experiencing episodes of palpitations. Pt states the palpitations have been consistent. Pt is a current smoker.         Review of patient's allergies indicates:   Allergen Reactions    Benadryl [diphenhydramine hcl] Anxiety     Hallucinations     Diphenhydramine Anxiety and Other (See Comments)     Past Medical History:   Diagnosis Date    Anxiety disorder, unspecified     Depression     Essential fatty acid deficiency     Gastric motility disorder     Graves disease     History of ovarian cyst     frequent, reoccurring with hx of rupture and hemorrhage    Iron deficiency anemia, unspecified     Pelvic congestion syndrome     Poor dentition     POTS (postural orthostatic tachycardia syndrome)     Smoker     Vitamin D deficiency      Past Surgical History:   Procedure Laterality Date    CHOLECYSTECTOMY      DX-LAP, EVACUATION HEMOPERITONEUM  11/30/2021    GALLBLADDER SURGERY      LAPAROSCOPIC CHOLECYSTECTOMY N/A 10/10/2022    Procedure: CHOLECYSTECTOMY, LAPAROSCOPIC;  Surgeon: Tobin Aguiar MD;  Location: [facility];  Service: General;  Laterality: N/A;    TONSILLECTOMY  11/2011     Family History   Problem Relation Name Age of Onset    Heart disease Paternal Grandfather Dany Baeza II     Cancer Paternal Grandfather Dany Baeza II     Stroke Paternal Grandfather Dany Baeza II     Heart disease Paternal Grandmother Sophie Baeza   "   Pancreatic cancer Maternal Grandmother Yahaira "Casi" Nadira     Ovarian cancer Maternal Grandmother Yahaira "Casi" Nadira     Cancer Maternal Grandmother Yahaira "Casi" Nadira     Heart disease Father Dany Baeza III     Heart attack Father Dany Baeza III     Coronary artery disease Father Dany Baeza III     Cancer Mother Priscilla Janene         colorectal cancer    Bipolar disorder Mother Priscilla Janene     Irritable bowel syndrome Mother Priscilla Janene     No Known Problems Brother      Kidney disease Sister      No Known Problems Son 2     Stroke Paternal Aunt Emma Baeza     Stroke Maternal Aunt Sara Warner      Social History[1]  Review of Systems   Constitutional:  Negative for chills, fatigue and fever.   HENT:  Negative for congestion and sore throat.    Eyes:  Negative for visual disturbance.   Respiratory:  Negative for cough and shortness of breath.    Cardiovascular:  Positive for palpitations. Negative for chest pain.   Gastrointestinal:  Positive for abdominal pain and nausea. Negative for diarrhea and vomiting.   Genitourinary:  Negative for dysuria.   Musculoskeletal:  Negative for myalgias.   Skin:  Negative for rash.   Neurological:  Negative for weakness, numbness and headaches.   All other systems reviewed and are negative.      Physical Exam     Initial Vitals [07/24/25 1331]   BP Pulse Resp Temp SpO2   97/62 (!) 180 (!) 24 98.6 °F (37 °C) 99 %      MAP       --         Physical Exam    Nursing note and vitals reviewed.  Constitutional: She appears well-developed and well-nourished.   Poor dentition. Thin.    HENT:   Head: Normocephalic and atraumatic. Mouth/Throat: Oropharynx is clear and moist.   Eyes: Pupils are equal, round, and reactive to light.   Neck: Neck supple.   Normal range of motion.  Cardiovascular:  Regular rhythm, normal heart sounds and intact distal pulses.   Tachycardia present.         Heart rate while being examined sitting between 110-140.    Pulmonary/Chest: Breath " sounds normal.   Abdominal: Abdomen is soft. Bowel sounds are normal. There is no abdominal tenderness. There is no rebound.   Musculoskeletal:         General: No tenderness. Normal range of motion.      Cervical back: Normal range of motion and neck supple.     Neurological: She is alert and oriented to person, place, and time. She has normal strength. No sensory deficit. GCS score is 15. GCS eye subscore is 4. GCS verbal subscore is 5. GCS motor subscore is 6.   Skin: Skin is warm and dry.   Psychiatric: Her mood appears anxious.         ED Course   Procedures  Labs Reviewed   COMPREHENSIVE METABOLIC PANEL - Abnormal       Result Value    Sodium 137      Potassium 3.1 (*)     Chloride 107      CO2 21 (*)     Glucose 93      Blood Urea Nitrogen 9.5      Creatinine 0.71      Calcium 8.8      Protein Total 7.7      Albumin 4.2      Globulin 3.5      Albumin/Globulin Ratio 1.2      Bilirubin Total 0.6      ALP 64      ALT 12      AST 15      eGFR >60      Anion Gap 9.0      BUN/Creatinine Ratio 13     URINALYSIS, REFLEX TO URINE CULTURE - Abnormal    Color, UA Yellow      Appearance, UA Clear      Specific Gravity, UA 1.020      pH, UA 8.0      Protein, UA Negative      Glucose, UA Normal      Ketones, UA 1+ (*)     Blood, UA Negative      Bilirubin, UA Negative      Urobilinogen, UA Normal      Nitrites, UA Negative      Leukocyte Esterase, UA 25 (*)     RBC, UA 0-5      WBC, UA 6-10 (*)     Bacteria, UA Occasional      Squamous Epithelial Cells, UA Few (*)     Mucous, UA Trace (*)    CBC WITH DIFFERENTIAL - Abnormal    WBC 5.88      RBC 4.32      Hgb 13.0      Hct 40.2      MCV 93.1      MCH 30.1      MCHC 32.3 (*)     RDW 13.3      Platelet 211      MPV 11.5 (*)     Neut % 54.9      Lymph % 34.5      Mono % 7.8      Eos % 1.7      Basophil % 0.9      Imm Grans % 0.2      Neut # 3.23      Lymph # 2.03      Mono # 0.46      Eos # 0.10      Baso # 0.05      Imm Gran # 0.01      NRBC% 0.0     LIPASE - Normal    Lipase  Level 9     PREGNANCY TEST, URINE RAPID - Normal    hCG Qualitative, Urine Negative     CBC W/ AUTO DIFFERENTIAL    Narrative:     The following orders were created for panel order CBC auto differential.  Procedure                               Abnormality         Status                     ---------                               -----------         ------                     CBC with Differential[[id number]]       Abnormal            Final result                 Please view results for these tests on the individual orders.     EKG Readings: (Independently Interpreted)   Initial Reading: No STEMI. Rhythm: Supraventricular Tachycardia (SVT). Heart Rate: 181. Ectopy: No Ectopy. Conduction: Normal. ST Segments: Normal ST Segments. T Waves: Normal. Axis: Right Axis Deviation.   Done on 7/24/25 at 1334.    Other EKG Interpretations: Repeat EKG done at 1344. Rate of 130 Sinus tachycardia      ECG Results              Repeat EKG 12-lead (Final result)        Collection Time Result Time QRS Duration [facility] QTC Calculation    07/24/25 13:44:05 07/24/25 14:51:25 76 438                     Final result by Interface, Lab In [facility] (07/24/25 14:51:27)                   Narrative:    Test Reason : R00.0,    Vent. Rate : 130 BPM     Atrial Rate : 130 BPM     P-R Int : 134 ms          QRS Dur :  76 ms      QT Int : 298 ms       P-R-T Axes :  71  84  47 degrees    QTcB Int : 438 ms    Sinus tachycardia  Possible Inferior infarct ,age undetermined  Anterolateral infarct ,age undetermined  Abnormal ECG  When compared with ECG of 24-Jul-2025 13:34,  Anterior infarct is now Present  Anterolateral infarct is now Present  Nonspecific T wave abnormality now evident in Inferior leads  Confirmed by Herve Amaro ([id number]) on 7/24/2025 2:51:19 PM    Referred By:            Confirmed By: Herve Amaro                                     EKG 12-lead (Final result)        Collection Time Result Time QRS Duration [facility] QTC Calculation    07/24/25  13:34:24 07/24/25 14:51:10 74 461                     Final result by Interface, Lab In [facility] (07/24/25 14:51:15)                   Narrative:    Test Reason : R10.10,    Vent. Rate : 181 BPM     Atrial Rate :    BPM     P-R Int :    ms          QRS Dur :  74 ms      QT Int : 266 ms       P-R-T Axes :     91  70 degrees    QTcB Int : 461 ms    Supraventricular tachycardia  Rightward axis  Nonspecific ST abnormality  Abnormal ECG  When compared with ECG of 22-Feb-2025 21:09,  Vent. rate has increased by  87 bpm  ST now depressed in Inferior leads  Confirmed by Herve Amaro ([id number]) on 7/24/2025 2:51:09 PM    Referred By:            Confirmed By: Herve Amaro                                  Imaging Results              CT Abdomen Pelvis With IV Contrast NO Oral Contrast (Final result)  Result time 07/24/25 18:07:22      Final result by Rohith Tim MD (07/24/25 18:07:22)                   Impression:      No abnormality seen      Electronically signed by: Román Tim  Date:    07/24/2025  Time:    18:07               Narrative:    EXAMINATION:  CT ABDOMEN PELVIS WITH IV CONTRAST    CLINICAL HISTORY:  Abdominal pain, acute, nonlocalized;    TECHNIQUE:  Low dose axial images, sagittal and coronal reformations were obtained from the lung bases to the pubic symphysis following the IV administration of contrast. Automatic exposure control (AEC) is utilized to reduce patient radiation exposure.    COMPARISON:  None.    FINDINGS:  The lung bases are clear.  There is a calcified granuloma in the lower lobe.    The liver appears normal.  No liver mass or lesion is seen.  Portal and hepatic veins appear normal.    The patient is status post cholecystectomy.    The pancreas appears normal.  No pancreatic mass or lesion is seen.    The spleen shows no acute abnormality.    The adrenal glands appear normal.  No adrenal nodule is seen.    The kidneys appear normal.  No hydronephrosis is seen.  No hydroureter is  seen.  No nephrolithiasis is seen.  No obvious ureteral stones are seen.    Urinary bladder appears grossly unremarkable.  The endometrium is slightly prominent but unremarkable for the patient's age.  There is some cysts seen in the left ovary which appear to be small follicular cysts.    No colitis is seen.  No diverticulitis is seen.  No obvious colonic mass or lesion is seen.  The appendix appears normal.    No free air is seen.  No free fluid is seen.                                       Medications   sucralfate 100 mg/mL suspension 1 g (1 g Oral Given 7/24/25 1740)   aluminum-magnesium hydroxide-simethicone 200-200-20 mg/5 mL suspension 30 mL (has no administration in time range)   sodium chloride 0.9% bolus 1,000 mL 1,000 mL (0 mLs Intravenous Stopped 7/24/25 1659)   ondansetron injection 4 mg (4 mg Intravenous Given 7/24/25 1401)   ketorolac injection 15 mg (15 mg Intravenous Given 7/24/25 1432)   potassium chloride CR tablet 10 mEq (10 mEq Oral Given 7/24/25 1740)   iohexoL (OMNIPAQUE 350) injection 100 mL (100 mLs Intravenous Given 7/24/25 1755)     Medical Decision Making  The differential diagnosis includes, but is not limited to, pyelonephritis, UTI, SVT, dehydration pregnancy, undifferentiated abdominal pain, or pancreatitis.     Amount and/or Complexity of Data Reviewed  Labs: ordered. Decision-making details documented in ED Course.  Radiology: ordered and independent interpretation performed. Decision-making details documented in ED Course.  ECG/medicine tests: ordered and independent interpretation performed. Decision-making details documented in ED Course.    Risk  Prescription drug management.            Scribe Attestation:   Scribe #1: I performed the above scribed service and the documentation accurately describes the services I performed. I attest to the accuracy of the note.    Attending Attestation:           Physician Attestation for Scribe:  Physician Attestation Statement for Scribe #1:  I, Rashawn Simpson MD, reviewed documentation, as scribed by Radha Kennedy in my presence, and it is both accurate and complete.             ED Course as of 07/24/25 1852   Thu Jul 24, 2025   1705 CMP remarkable for mildly low potassium of 3.1  CBC normal specifically white blood cell count and hemoglobin unremarkable  Pregnancy test negative   Urinalysis normal except for 1+ ketones  Lipase negative [MP]   1707 Patient remains normotensive, no tachycardia while in the ED. patient without gallbladder, symptoms likely gastritis due to the length of time they have been there.  Will place on PPIs, with PCP follow [MP]   1736 Was preparing to discharge patient, when she expressed that she is very concerned that something bad is going on that has not been diagnosed.  She is asking for imaging, specifically a CT.  She states that previously when she had a ruptured ovarian cyst, which she states this does not feel like, she was going to be discharged but insisted on a CT scan which revealed the large amount of hemoperitoneum.  Certainly we discussed the risks and benefit of imaging at her young age.  I told her that I do not think that we will see anything additional with a CT scan, but the patient is crying, very concerned and assistant.  After shared medical decision-making, will order CT scan of the abdomen and pelvis [MP]   1738 Review of the patient's previous workups, she has primarily been seen at [facility], multiple times for similar symptoms.  She has had a pretty extensive workup including a CTA in March of this year.  It did not reveal any signs of mesenteric ischemia [MP]   1852 CT shows no abnormalities.  Patient has been having these symptoms for somewhat chronic.  Of time.  Multiple workups, CTA seen by specialist.  Still unknown etiology but cleared for discharge [MP]      ED Course User Index  [MP] Rashawn Simpson MD                               Clinical Impression:  Final diagnoses:  [R10.10] Upper  abdominal pain  [R00.0] Tachycardia  [R10.9] Abdominal pain, unspecified abdominal location (Primary)  [I47.10] Paroxysmal SVT (supraventricular tachycardia)          ED Disposition Condition    Discharge Stable          ED Prescriptions       Medication Sig Dispense Start Date End Date Auth. Provider    pantoprazole (PROTONIX) 20 MG tablet Take 1 tablet (20 mg total) by mouth once daily. 30 tablet 7/24/2025 7/24/2026 Rashawn Simpson MD    ondansetron (ZOFRAN-ODT) 4 MG TbDL Take 1 tablet (4 mg total) by mouth every 6 (six) hours as needed. 12 tablet 7/24/2025 -- Rashawn Simpson MD          Follow-up Information       Follow up With Specialties Details Why Contact Info    Primary care physician  Schedule an appointment as soon as possible for a visit   If you do not have a primary care physician please call [phone] to schedule an appointment    [facility] - Emergency Dept Emergency Medicine Go to  If symptoms worsen, As needed [address] [facility] [id number] [phone]                   Rashawn Simpson MD  07/24/25 5805       [1]   Social History  Tobacco Use    Smoking status: Every Day     Current packs/day: 1.50     Average packs/day: 1.3 packs/day for 31.6 years (40.3 ttl pk-yrs)     Types: Cigarettes     Start date: 1/1/2008     Passive exposure: Never    Smokeless tobacco: Never   Substance Use Topics    Alcohol use: Never    Drug use: Never        Rashawn Simpson MD  07/24/25 [id number]

## 2025-07-24 NOTE — FIRST PROVIDER EVALUATION
Medical screening examination initiated.  I have conducted a focused provider triage encounter, findings are as follows:    Brief history of present illness:  30 year old female presents to ER with c/o LUQ abdominal pain x 1 year which has been worse since yesterday. Patient also reports elevated HR onset today.     Vitals:    07/24/25 1331   BP: 97/62   Pulse: (!) 141   Resp: (!) 24   Temp: 98.6 °F (37 °C)   TempSrc: Oral   SpO2: 99%   Weight: 51.7 kg (114 lb)   Height: 5' (1.524 m)       Pertinent physical exam:  awake and alert, nad    Brief workup plan:  labs, ua, EKG     Preliminary workup initiated; this workup will be continued and followed by the physician or advanced practice provider that is assigned to the patient when roomed.

## 2025-07-31 ENCOUNTER — OFFICE VISIT (OUTPATIENT)
Dept: INTERNAL MEDICINE | Facility: CLINIC | Age: 30
End: 2025-07-31
Payer: MEDICAID

## 2025-07-31 VITALS
RESPIRATION RATE: 16 BRPM | SYSTOLIC BLOOD PRESSURE: 110 MMHG | BODY MASS INDEX: 23.08 KG/M2 | HEART RATE: 100 BPM | WEIGHT: 118.19 LBS | DIASTOLIC BLOOD PRESSURE: 77 MMHG | TEMPERATURE: 97 F

## 2025-07-31 DIAGNOSIS — E05.00 GRAVES DISEASE: ICD-10-CM

## 2025-07-31 DIAGNOSIS — R10.2 PELVIC PAIN: ICD-10-CM

## 2025-07-31 DIAGNOSIS — N94.89 PELVIC CONGESTION SYNDROME: ICD-10-CM

## 2025-07-31 DIAGNOSIS — K55.1 SMAS (SUPERIOR MESENTERIC ARTERY SYNDROME): Primary | ICD-10-CM

## 2025-07-31 DIAGNOSIS — F17.200 SMOKER: ICD-10-CM

## 2025-07-31 PROCEDURE — 99214 OFFICE O/P EST MOD 30 MIN: CPT | Mod: PBBFAC

## 2025-07-31 NOTE — PROGRESS NOTES
INTERNAL MEDICINE CLINIC  CLINIC NOTE    Patient Name: Royce Quigley  YOB: 1995    PRESENTING HISTORY       History of Present Illness:  Ms. Rocye Quigley is a 30 y.o. female w/ an active medical problem list including Grave's disease, chronic abdominal pain, and depression/anxiety who is presenting to [facility] IM clinic for follow up visit. S    10/2023: Per patient she has been seen by several specialists in the past including GI, cardiology, and vascular surgery with no resolution in symptoms.  She reports chronic abdominal pain over the last 3 years mostly located in her left upper quadrant and epigastric region associated with swelling and episodes of severe pain.  She has had multiple studies over the past year including CT abdomen/pelvis, CTA abdomen/pelvis, and abdominal US with no reported change.  She has a history of an exploratory laparotomy where she reportedly had a hemorrhage of an ovarian cyst requiring evacuation of a large amount of intraperitoneal fluid.   She also reports being diagnosed with POTS and nutcracker syndrome in the past but denies any treatment.  She also states she has a history of endocarditis for which she previously completed antibiotics for and was following with cardiology outpatient. She is also endorsing a 10 pound weight loss over the last 3 years with an inability to gain weight.  She reports abnormal bowel movements that occur daily.  She reports current abdominal pain 6/10.  She received EGD from Dr. Galindo earlier in the year with results showing esophagitis but no other abnormalities noted on report. She has been on several medications for abdominal pain including PPI's, pepcid, zofran, sucralfate with no resolution in symptoms.  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  She follows with psychiatrist for related issues and is currently on xanax PRN as well as  duloxetine.       4/11/2024:  Patient here for follow-up.  Continues to endorse LUQ abdominal pain.  Similar complaints as discussed at prior visit as above.  Is following with new GI physician, Dr. Foley.  Recently had a gastric emptying study done that was inconclusive.  Per patient continuing workup with capsule endoscopy that she is working on rescheduling. Otherwise, patient reporting no new acute complaints.  States she feels she may experience constipation at times as well as bloating and loose stools but mostly reports abdominal pain and swelling in LUQ.  Continues to follow with psychiatrist and counselor for related issues.  Endorses compliance with medications.     03/5/25  Ms. Quigley continues to complain of the above symptoms. Mentions episodes of flushing, abdominal/chest/back pain, and elevated BP's. Pain is not related to exertion and is not post-prandial. Of note, after she endorsed her symptoms of flushing and elevated BP's, she asked specifically about neuroendocrine tumors. When saying that I could order serotonin or metanephrine levels, she asked if that was for carcinoid or pheochromocytoma respectively. She alternates between diarrhea/constipation but more often has loose stools. Is following with her GI doctor and being worked up for median arcuate ligament syndrome. Following with her cardiologist as well, Dr. Adames.     3/11/2025:  Patient is here for follow-up despite recent appt approx 1 week ago.  Reports her chronic symptoms were not addressed at that time. Continues to endorse abdominal/chest/back pain and similar symptoms as above.  No new or acute complaints today.     07/31/2025:   Patient continues to endorse diffuse pain chest abdomen back extremities.  Patient states that her worse pain continues to be left upper quadrant to midepigastric.  Patient states is worse after meals, as early satiety, as had very little appetite for years.  Patient discussed multiple abdominal,  anatomical compression syndromes including median arcuate ligament syndrome, superior mesenteric artery syndrome, May-Thurner syndrome, nutcracker syndrome.  Patient has seen multiple GI physicians and vascular surgery with no definitive findings or indications or intervention to date.  Of interest on most recent CT abdomen pelvis, patient's degree of lumbar lordosis is significant enough that it approaches the anterior abdominal wall.  When measured, the anterior portion of L4/L5 is only 3.58 cm from the anterior abdominal wall when measured from the area of the umbilicus.  Patient is also concerned that she has pancreatic cancer.  Negative on past workup so far and negative on multiple CT abdomens for any lesions of pancreas.  Patient states that her abdominal pain and appetite loss is her main complaint today.  No other acute complaints or changes were noted.    Endo - managing methimazole  GI - working up for MALS - patient reports her current GI doctor informed her that he has nothing else to offer her at this time. Patient is interested in seeing advanced GI practitioner.   Ob-Gyn - Chronic pelvic pain (intersitial cystitis, possible endometriosis, constipation, pelvic floor muscle spasm)  -miralax, water intake, increased activity  -intersitial cystitis diet  -pelvic floor PT  -endometriosis (f/u ob-gyn)      Review of Systems   Constitutional:  Positive for weight loss. Negative for fever.   Gastrointestinal:  Positive for abdominal pain, constipation, diarrhea and nausea. Negative for melena and vomiting.   Genitourinary:  Positive for dysuria. Negative for frequency and hematuria.   Musculoskeletal:  Positive for myalgias.   Neurological:  Positive for headaches.     As above    PAST HISTORY:     Past Medical History:   Diagnosis Date    Anxiety disorder, unspecified     Depression     Essential fatty acid deficiency     Gastric motility disorder     Graves disease     History of ovarian cyst     frequent,  "reoccurring with hx of rupture and hemorrhage    Iron deficiency anemia, unspecified     Pelvic congestion syndrome     Poor dentition     POTS (postural orthostatic tachycardia syndrome)     Smoker     Vitamin D deficiency        Past Surgical History:   Procedure Laterality Date    CHOLECYSTECTOMY      DX-LAP, EVACUATION HEMOPERITONEUM  11/30/2021    GALLBLADDER SURGERY      LAPAROSCOPIC CHOLECYSTECTOMY N/A 10/10/2022    Procedure: CHOLECYSTECTOMY, LAPAROSCOPIC;  Surgeon: Tobin Aguiar MD;  Location: [facility];  Service: General;  Laterality: N/A;    TONSILLECTOMY  11/2011       Family History   Problem Relation Name Age of Onset    Heart disease Paternal Grandfather Dany Aryan II     Cancer Paternal Grandfather Dany Aryan II     Stroke Paternal Grandfather Dany Baeza II     Heart disease Paternal Grandmother Sophie aBeza     Pancreatic cancer Maternal Grandmother Yahaira "Casi" Nadira     Ovarian cancer Maternal Grandmother Yahaira "Casi" Nadira     Cancer Maternal Grandmother Yahaira "Casi" Nadira     Heart disease Father Dany Baeza III     Heart attack Father Dany Baeza III     Coronary artery disease Father Dany Aryan III     Cancer Mother Priscilla Janene         colorectal cancer    Bipolar disorder Mother Priscilla Janene     Irritable bowel syndrome Mother Priscilla Janene     No Known Problems Brother      Kidney disease Sister      No Known Problems Son 2     Stroke Paternal Aunt Emma Baeza     Stroke Maternal Aunt Sara Warner        Social History     Socioeconomic History    Marital status: Single   Tobacco Use    Smoking status: Every Day     Current packs/day: 1.50     Average packs/day: 1.3 packs/day for 31.6 years (40.4 ttl pk-yrs)     Types: Cigarettes     Start date: 1/1/2008     Passive exposure: Never    Smokeless tobacco: Never   Substance and Sexual Activity    Alcohol use: Never    Drug use: Never    Sexual activity: Yes     Partners: Female, Male     Birth control/protection: None     Social " Drivers of Health     Financial Resource Strain: Low Risk  (3/11/2025)    Overall Financial Resource Strain (CARDIA)     Difficulty of Paying Living Expenses: Not very hard   Food Insecurity: No Food Insecurity (3/11/2025)    Hunger Vital Sign     Worried About Running Out of Food in the Last Year: Never true     Ran Out of Food in the Last Year: Never true   Transportation Needs: Unmet Transportation Needs (3/11/2025)    PRAPARE - Transportation     Lack of Transportation (Medical): Yes     Lack of Transportation (Non-Medical): Yes   Physical Activity: Inactive (3/11/2025)    Exercise Vital Sign     Days of Exercise per Week: 0 days     Minutes of Exercise per Session: 0 min   Stress: Stress Concern Present (3/11/2025)    [employer] - Occupational Stress Questionnaire     Feeling of Stress : Very much   Housing Stability: Low Risk  (3/11/2025)    Housing Stability Vital Sign     Unable to Pay for Housing in the Last Year: No     Number of Times Moved in the Last Year: 0     Homeless in the Last Year: No         MEDICATIONS & ALLERGIES:     Current Outpatient Medications on File Prior to Visit   Medication Sig    acetaminophen (TYLENOL EXTRA STRENGTH) 500 MG tablet Take 500 mg by mouth every 8 (eight) hours as needed.    ALPRAZolam (XANAX) 1 MG tablet Take 1 mg by mouth 2 (two) times daily as needed.    DULoxetine (CYMBALTA) 60 MG capsule Take 60 mg by mouth once daily.    ibuprofen (ADVIL,MOTRIN) 600 MG tablet 400 mg every 8 (eight) hours as needed.    methIMAzole (TAPAZOLE) 5 MG Tab Take 1/2 tablet daily    ondansetron (ZOFRAN) 4 MG tablet Take 1 tablet (4 mg total) by mouth every 6 (six) hours as needed for Nausea.    pantoprazole (PROTONIX) 20 MG tablet Take 1 tablet (20 mg total) by mouth once daily.    propranoloL (INDERAL) 10 MG tablet Take 10 mg by mouth Daily.    benzocaine (ORAJEL) 10 % mucosal gel Use as directed in the mouth or throat 3 (three) times daily as needed for  Pain.    dicyclomine (BENTYL) 20 mg tablet Take 1 tablet (20 mg total) by mouth every 6 (six) hours. (Patient not taking: Reported on 7/31/2025)    ondansetron (ZOFRAN-ODT) 4 MG TbDL Take 1 tablet (4 mg total) by mouth every 6 (six) hours as needed.     No current facility-administered medications on file prior to visit.       Review of patient's allergies indicates:   Allergen Reactions    Benadryl [diphenhydramine hcl] Anxiety     Hallucinations     Diphenhydramine Anxiety and Other (See Comments)       OBJECTIVE:   Vital Signs:  Vitals:    07/31/25 1246   BP: 110/77   Pulse: 100   Resp: 16   Temp: 97.1 °F (36.2 °C)   Weight: 53.6 kg (118 lb 3.2 oz)       No results found for this or any previous visit (from the past 24 hours).      Physical Exam  General - Appears comfortable, appropriatley conversive   Mental Status - alert and oriented x 3, speaking in logical, relevant sentences   HEENT - no rhinorrhea   Cardiac - RRR, no murmurs, rubs, or gallops; no edema in LE   Respiratory - breathing comfortably; clear to ascultation bilaterally   Abdominal - nondistended, soft, mildly tender in upper abdomen bilaterally  Extremities - LE, UE, and joints are nonerythematous and nonswollen   Skin - no rashes or bruises seen on skin      Labs:  CMP:   Lab Results   Component Value Date    CALCIUM 8.8 07/24/2025    ALBUMIN 4.2 07/24/2025    PROT 7.7 07/24/2025     07/24/2025    K 3.1 (L) 07/24/2025    CO2 21 (L) 07/24/2025     07/24/2025    BUN 9.5 07/24/2025    CREATININE 0.71 07/24/2025    ALKPHOS 64 07/24/2025    ALT 12 07/24/2025    AST 15 07/24/2025    BILITOT 0.6 07/24/2025      CBC:   Lab Results   Component Value Date    WBC 5.88 07/24/2025    HGB 13.0 07/24/2025    HCT 40.2 07/24/2025    MCV 93.1 07/24/2025    RDW 13.3 07/24/2025     FLP:   Lab Results   Component Value Date    CHOL 128 12/03/2021    HDL 44 12/03/2021    TRIG 58 12/03/2021    TOTALCHOLEST 3 12/03/2021     Coagulation:   Lab Results    Component Value Date    INR 0.96 04/04/2018     DM:   Lab Results   Component Value Date    HGBA1C 4.8 03/13/2025    EAG 91.1 03/13/2025    LDLCALC 72.00 12/03/2021    CREATININE 0.71 07/24/2025     Thyroid:   Lab Results   Component Value Date    TSH 4.82 (H) 07/24/2025    JFGKVR1MKTR 0.89 07/10/2025    K3XWWGZ 7.5 08/29/2019     LFTs:   Lab Results   Component Value Date    ALBUMIN 4.2 07/24/2025    AST 15 07/24/2025    ALT 12 07/24/2025    ALKPHOS 64 07/24/2025     Anemia:   Lab Results   Component Value Date    PCNFTVDE94 529 02/05/2018    FOLATE 10.6 02/05/2018        ASSESSMENT & PLAN:     Graves disease  Multinodular goiter   Palpitations   -following with endocrinology, last seen 12/2024  -Soft tissue US 07/16/2025: Right marsha thyroid measures 4.7 x 1.6 x 1.6 cm, left marsha thyroid measures 5 cm x 1.4 x 1.6 cm isthmus measures 1.6 mm in thickness.Subcentimeter nodules identified on the right measuring 5 mm x 3 mm x 5 mm and 4 mm x 3 mm x 4 mm these nodules do not meet criteria for biopsy and or surveillance.Sub centimeter nodule on the left measures 4 mm x 2 mm x 3 mm these nodule does not meet criteria for biopsy and or surveillance.  -continue methimazole 5mg and propranolol 10mg per endocrine recs   -continue scheduled f/u   -free T4 0.89, TSH 4.82     Anxiety/depression   -per patient following with outside psychiatrist      Chronic abdominal pain   -Patient has had multiple imaging modalities performed over the last year with no significant explanation for her symptoms   -gastric emptying study performed 3/28/2025 which was inconclusive. Had small capsule endoscopy scheduled at [facility]  -For GI, patient followed with Dr. Foley in [place]   -US showed elevated celiac artery velocities. GI obtained CTA A/P w/ breathing protocol to check for MALS (median arcuate ligament syndrome) which showed no significant celiac artery narrowing.   -previously ordered 24-hour urine metanephrines, 24-hr urine  5-HIAA levels which patient did not perform.  Can consider re-ordering, however, I suspect her SNRI will interfere with the metanephrine testing.  -prior lab work performed unremarkable for abnormalities.    -has been evaluated by GYN (Dr. Donaldson 8/2024 - suggested differentials of pelvic vascular congestion, possible endometriosis, constipation, pelvic floor muscle spasms)(recommended pelvic floor PT, interstitial cystitis diet, miralax trial, and consideration for diagnostic laparoscopy).  -Has been evaluated by vascular surgery, cardiology, and GI in the past per patient with no further recommendations.    -although gastric transit study was inconclusive, discussed degree of lordosis with care team and attending for concern of anatomic or vascular abnormalities for structures that run with a lordotic spine with anterior point of low for vertebral body being 3.58 cm from anterior abdominal wall at umbilicus.  We will refer to vascular surgery.  -this appears to be a complicated situation that has had extensive workup performed in the past. Unclear etiology at this time.  Will continue to monitor.  -patient continues to follow with new GI physician.    POTS?  Orthostatic hypotension  -follows Dr. Adames with [facility] - obtaining records  -given sx of flushing, hypertensive episodes, ordering urine metanephrines and 5-HIAA levels. SNRI may interfere with testing though and patient counseled on this possibility.      Healthcare Maintenance:  Cancer Screening  #Cervical Cancer: ASCUS on PAP 8/2024    Vaccinations:  Immunization History   Administered Date(s) Administered    DTP 1995, 1995, 1995    DTaP 07/05/1995, 07/06/1999    HIB 1995, 1995    HPV 9-Valent 01/15/2009, 03/06/2009, 07/07/2009    HPV Quadrivalent 01/15/2009, 03/06/2009, 07/07/2009    Hepatitis B, Pediatric/Adolescent 1995, 1995, 1995    Hib-HbOC 1994, 07/05/1995    MMR 08/08/1995, 07/06/1999     Meningococcal Conjugate (MCV4P) 04/03/2006    OPV 1995, 1995, 1994, 07/06/1999    Td (ADULT) 04/03/2006    Varicella 04/03/2006      RTC in 3 months     Herve Simon DO  [facility], PGY-2    Answers submitted by the patient for this visit:  Abdominal Pain Questionnaire (Submitted on 3/11/2025)  Chief Complaint: Abdominal pain  Chronicity: chronic  Onset: more than 1 year ago  Onset quality: gradual  Frequency: constantly  Episode duration: 6 Hours  Progression since onset: gradually worsening  Pain location: LUQ, epigastric region  Pain - numeric: 6/10  Pain quality: aching, cramping, a sensation of fullness, colicky  anorexia: Yes  arthralgias: Yes  belching: Yes  flatus: Yes  hematochezia: No  Aggravated by: deep breathing, eating  Relieved by: nothing  Diagnostic workup: CT scan  Pain severity: severe  Treatments tried: acetaminophen, antacids, antibiotics, H2 blockers, proton pump inhibitors  Improvement on treatment: no relief  abdominal surgery: Yes  colon cancer: No  Crohn's disease: No  gallstones: No  GERD: Yes  irritable bowel syndrome: No  kidney stones: No  pancreatitis: No  PUD: No  ulcerative colitis: No  UTI: Yes

## 2025-08-07 ENCOUNTER — OFFICE VISIT (OUTPATIENT)
Dept: GASTROENTEROLOGY | Facility: CLINIC | Age: 30
End: 2025-08-07
Payer: MEDICAID

## 2025-08-07 DIAGNOSIS — R10.12 LEFT UPPER QUADRANT PAIN: Primary | ICD-10-CM

## 2025-08-07 NOTE — PROGRESS NOTES
"Subjective:       Patient ID: Royce Quigley is a 30 y.o. female.    Chief Complaint: No chief complaint on file.    Telemedicine encounter today to follow-up with aforementioned complaints.  Workup to date had been unremarkable.  She was sent for CT angiogram to evaluate for MALS which was unremarkable.    Not feeling any better. Feels like she's getting worse. Poor quality of life. Unable to work. Significant pain    Chest/upper abd pressure/pain. "Band-like"   LUQ pain. "Something is swollen all the time"  Gets full fast. Pain with eating/deep breathing  Twisting/cramping episodes, more commonly after eating  Bloating  Incomplete bm. Constipated but when she doesn't have bm they are "loose, slimy, mucusy"  Multiple non-GI issues    Concerned about vascular issues (MALS vs SMA syndrome) vs pancreatic cancer. Grandmother apparently       Review of Systems      The following portions of the patient's history were reviewed and updated as appropriate: allergies, current medications, past family history, past medical history, past social history, past surgical history and problem list.    Objective:      Physical Exam      Pertinent labs and imaging studies reviewed    Assessment:       No diagnosis found.    Plan:       Uncertain cause of patient's complaints.  Vascular mildly could certainly cause some of her issues however this is outside of my field.  Agree with follow-up with local vascular surgery.  Low suspicion for pancreatic cancer however I understand patient is significant trepidation given family history.  Will refer to advanced endoscopy   Check elastase however explained that pancreatic insufficiency generally presents with diarrhea as opposed to constipation which is sounds like she has experiencing some degree   Trial of MiraLax.  Could consider Linzess    (Portions of this note were dictated using voice recognition software and may contain dictation related errors in spelling/grammar/syntax not found " on text review)        The patient location is: [place]  The chief complaint leading to consultation is: abd pain    Visit type: audiovisual    Face to Face time with patient: 37 minutes  42 minutes of total time spent on the encounter, which includes face to face time and non-face to face time preparing to see the patient (eg, review of tests), Obtaining and/or reviewing separately obtained history, Documenting clinical information in the electronic or other health record, Independently interpreting results (not separately reported) and communicating results to the patient/family/caregiver, or Care coordination (not separately reported).         Each patient to whom he or she provides medical services by telemedicine is:  (1) informed of the relationship between the physician and patient and the respective role of any other health care provider with respect to management of the patient; and (2) notified that he or she may decline to receive medical services by telemedicine and may withdraw from such care at any time.    Notes:

## 2025-08-15 ENCOUNTER — OFFICE VISIT (OUTPATIENT)
Dept: ENDOCRINOLOGY | Facility: CLINIC | Age: 30
End: 2025-08-15
Payer: MEDICAID

## 2025-08-15 DIAGNOSIS — E05.00 GRAVES DISEASE: Primary | ICD-10-CM

## 2025-08-15 DIAGNOSIS — R73.01 IMPAIRED FASTING GLUCOSE: ICD-10-CM

## 2025-08-15 DIAGNOSIS — R00.2 PALPITATIONS: ICD-10-CM

## 2025-08-15 DIAGNOSIS — E04.2 MULTINODULAR GOITER: ICD-10-CM

## 2025-08-15 RX ORDER — AMOXICILLIN AND CLAVULANATE POTASSIUM 500; 125 MG/1; MG/1
1 TABLET, FILM COATED ORAL EVERY 12 HOURS
COMMUNITY
Start: 2025-07-30

## (undated) DEVICE — NDL PNEUMO INSUFFLATI 120MM

## (undated) DEVICE — CANNULA ENDOPATH XCEL 5X100MM

## (undated) DEVICE — TROCAR ENDOPATH XCEL 12X100MM

## (undated) DEVICE — TROCAR ENDOPATH ECEL

## (undated) DEVICE — SUT PDSII 4-0 PS-2 CLEAR MO

## (undated) DEVICE — SOL IRRI STRL WATER 1000ML

## (undated) DEVICE — GOWN POLY REINF BRTH SLV XL

## (undated) DEVICE — APPLICATOR CHLORAPREP ORN 26ML

## (undated) DEVICE — APPLIER CLIP ENDO MED/LG 10MM

## (undated) DEVICE — SYR 10CC LUER LOCK

## (undated) DEVICE — KIT LAPAROSCOPY UNIVERSITY

## (undated) DEVICE — APPLICATOR COTTON TIP 6IN STRL

## (undated) DEVICE — TOWEL OR DISP STRL BLUE 4/PK

## (undated) DEVICE — HANDLE DEVON RIGID OR LIGHT

## (undated) DEVICE — GLOVE PROTEXIS LTX MICRO  7.5

## (undated) DEVICE — GLOVE PROTEXIS PI SYN SURG 7.5

## (undated) DEVICE — ADHESIVE DERMABOND ADVANCED

## (undated) DEVICE — POSITIONER HEEL FOAM CONVOLTD

## (undated) DEVICE — GLOVE PROTEXIS BLUE LATEX 7

## (undated) DEVICE — SOL NACL IRR 3000ML

## (undated) DEVICE — IRRIGATOR SUCTION W/TIP

## (undated) DEVICE — MANIFOLD 4 PORT

## (undated) DEVICE — BAG TISS RETRV MONARCH 10MM

## (undated) DEVICE — GLOVE PROTEXIS BLUE LATEX 7.5

## (undated) DEVICE — GLOVE PROTEXIS BLUE LATEX 8

## (undated) DEVICE — GLOVE PROTEXIS HYDROGEL SZ7

## (undated) DEVICE — NDL HYPO REG 25G X 1 1/2

## (undated) DEVICE — SUT 0 VICRYL / UR6 (J603)

## (undated) DEVICE — SUT 3-0 VICRYL / SH (J416)